# Patient Record
Sex: FEMALE | Race: WHITE | NOT HISPANIC OR LATINO | URBAN - METROPOLITAN AREA
[De-identification: names, ages, dates, MRNs, and addresses within clinical notes are randomized per-mention and may not be internally consistent; named-entity substitution may affect disease eponyms.]

---

## 2023-02-20 PROBLEM — N97.9 FEMALE INFERTILITY: Status: ACTIVE | Noted: 2023-02-20

## 2023-03-14 NOTE — PROGRESS NOTES
Subjective   Tasia Garcia is a 37 y.o. female who presents for new to me patinet.  HPI  37-year-old female, new here for the first time, denies chest pain shortness of breath fever chills nausea vomiting mother to 4 years old, healthy as far as she know on mvi occasionally, has not followed with physician for many years . Mother passed from Pancreatic cancer 2021, at 58 y/o. Father is 64.  Patient is a brother who  from cerebral palsy.  The patient is active when she can she denies fever chills nausea vomiting constipation dysuria urgency frequency.  Review of Systems  Reviewed of 14 systems with the patient pertinent positives and pertinent negative are to be found in the history of present illness.  Objective     Visit Vitals  /84   Pulse 78   Resp 14      Physical Exam  HEENT: Atraumatic normocephalic the pupils are equal and round and reactive to light the sclerae nonicteric extraocular motion are intact.  Neck: Is supple without JVD no carotid bruits the trachea is midline there are no masses pulses are equal and bilateral with normal upstroke.  Skin: Normal.  Skin good texture.  Moist.  Good turgor.  No lesions, no rashes.  Lymph: No lymphadenopathy appreciated, no masses, no lesions  Lungs: Are clear to auscultation and percussion, good breath sounds bilaterally, no rhonchi, no wheezing, good diaphragmatic excursion.  Heart: Normal rate and normal rhythm S1, S2, no S3, no gallop, murmur or rub.  Abdomen: Soft, nontender, no organomegaly, good bowel sounds.    Extremities: Full range of motion, good pulses bilateral.  No cyanosis, no clubbing or edema.  Neuro: Cranial nerves II-XII are grossly intact there is no sensory or motor deficits.  Able to move all extremities.      Assessment/Plan     New patient    Follows with Gyn  Dr Kamilla Pool  Pap up todate    Fasting blood work    CBC BMP lipids AST ALT vitamin D    Vaccination  Seizure record    BMI  39.04 kg/msq  Life style  modification  Exercise 30 min 4 X week     With a long discussion about pancreatic cancer  Mother  young age 57 extremely difficult to deal with at this age as she was your best friend.  He have good support from your family and his stepfather.  I think it is very reasonable to have an ultrasound of the abdomen to look at your pancreas to make sure that everything looks okay.  Also recommended to follow-up routinely probably once a year for now unless otherwise instructed based on the blood test results.  If you have any questions feel free to call or make an appointment.          Problem List Items Addressed This Visit          Genitourinary    Female infertility       Endocrine/Metabolic    BMI 39.0-39.9,adult - Primary    Relevant Orders    US abdomen    Basic Metabolic Panel       Other    Physical exam    Relevant Orders    CBC    Basic Metabolic Panel    Lipid Panel    TSH    Hair thinning    Relevant Orders    TSH     Other Visit Diagnoses       Family history of pancreatic cancer        Relevant Orders    US abdomen              Arya Ku MD

## 2023-03-16 ENCOUNTER — OFFICE VISIT (OUTPATIENT)
Dept: PRIMARY CARE | Facility: CLINIC | Age: 38
End: 2023-03-16
Payer: COMMERCIAL

## 2023-03-16 VITALS
RESPIRATION RATE: 14 BRPM | BODY MASS INDEX: 38.64 KG/M2 | SYSTOLIC BLOOD PRESSURE: 122 MMHG | DIASTOLIC BLOOD PRESSURE: 84 MMHG | WEIGHT: 210 LBS | HEIGHT: 62 IN | HEART RATE: 78 BPM

## 2023-03-16 DIAGNOSIS — L65.9 HAIR THINNING: ICD-10-CM

## 2023-03-16 DIAGNOSIS — Z80.0 FAMILY HISTORY OF PANCREATIC CANCER: ICD-10-CM

## 2023-03-16 DIAGNOSIS — N97.9 FEMALE INFERTILITY: ICD-10-CM

## 2023-03-16 DIAGNOSIS — Z00.00 PHYSICAL EXAM: ICD-10-CM

## 2023-03-16 PROCEDURE — 3008F BODY MASS INDEX DOCD: CPT | Performed by: INTERNAL MEDICINE

## 2023-03-16 PROCEDURE — 80048 BASIC METABOLIC PNL TOTAL CA: CPT | Performed by: INTERNAL MEDICINE

## 2023-03-16 PROCEDURE — 36415 COLL VENOUS BLD VENIPUNCTURE: CPT | Performed by: INTERNAL MEDICINE

## 2023-03-16 PROCEDURE — 99204 OFFICE O/P NEW MOD 45 MIN: CPT | Performed by: INTERNAL MEDICINE

## 2023-03-16 PROCEDURE — 84443 ASSAY THYROID STIM HORMONE: CPT | Performed by: INTERNAL MEDICINE

## 2023-03-16 PROCEDURE — 80061 LIPID PANEL: CPT | Performed by: INTERNAL MEDICINE

## 2023-03-16 PROCEDURE — 85025 COMPLETE CBC W/AUTO DIFF WBC: CPT | Performed by: INTERNAL MEDICINE

## 2023-03-16 PROCEDURE — 1036F TOBACCO NON-USER: CPT | Performed by: INTERNAL MEDICINE

## 2023-03-16 ASSESSMENT — PAIN SCALES - GENERAL: PAINLEVEL: 0-NO PAIN

## 2023-07-13 ENCOUNTER — TELEMEDICINE (OUTPATIENT)
Dept: PRIMARY CARE | Facility: CLINIC | Age: 38
End: 2023-07-13
Payer: COMMERCIAL

## 2023-07-13 PROBLEM — E55.9 VITAMIN D DEFICIENCY: Status: ACTIVE | Noted: 2018-06-05

## 2023-07-13 PROCEDURE — 99213 OFFICE O/P EST LOW 20 MIN: CPT | Performed by: INTERNAL MEDICINE

## 2023-07-13 RX ORDER — TIRZEPATIDE 2.5 MG/.5ML
2.5 INJECTION, SOLUTION SUBCUTANEOUS
Qty: 3 ML | Refills: 1 | Status: SHIPPED | OUTPATIENT
Start: 2023-07-13

## 2023-07-13 NOTE — PROGRESS NOTES
Subjective   Tasia Garcia is a 37 y.o. female who presents for virtual visit.  HPI  Tasia is 37 female with a known history of elevated BMI vitamin D insufficiency patient called regarding her inability to lose weight, inquiring about Mounjaro, or Ozempic, patient tried multiple diets with difficulty losing weight, current BMI 39.04 kg meter square, patient denies fever chills nausea vomiting constipation diarrhea dysuria urgency frequency.  Patient admits to poor dietary regimen due to longer working hours.  Review of Systems  10 system review pertinent as above  Objective   Virtual  There were no vitals taken for this visit.   Physical Exam    Virtual    Assessment/Plan     Elevated BMI  39.04 kg meter square  Right multiple diets without success  And difficulties losing weight  Patient is up at 7 AM and goes to bed at 3 AM  Due to her work regimen  Majority of food is consumed between 7 PM and 3 AM  Patient is primarily sedentary does not exercise  We spoke about caloric restrictive diet  Including 2 hard-boiled eggs at about 6 PM 7 PM  A salad and protein about 11 PM  And plenty of fluids.  In addition we will try Mounjaro 2.5 mg daily  To try and help with weight management  Possible side effects including but not limited to abdominal pain abdominal bloating diarrhea  This will come in to affect only if patient has a prescription  She will let me know so we can review   Please call if any updates or any questions        Problem List Items Addressed This Visit       BMI 39.0-39.9,adult - Primary    Relevant Medications    tirzepatide (Mounjaro) 2.5 mg/0.5 mL pen injector         Arya Ku MD

## 2023-07-17 ENCOUNTER — TELEPHONE (OUTPATIENT)
Dept: PRIMARY CARE | Facility: CLINIC | Age: 38
End: 2023-07-17
Payer: COMMERCIAL

## 2024-02-06 NOTE — PROGRESS NOTES
Subjective   Tasia Garcia is a 38 y.o. female who presents for a follow-up.  HPI  38-year-old female, new here for the first time, denies chest pain shortness of breath fever chills nausea vomiting mother to 4 years old, healthy as far as she know on mvi occasionally, has not followed with physician for many years . Mother passed from Pancreatic cancer 2021, at 58 y/o. Father is 64.  Patient is a brother who  from cerebral palsy.  The patient is active when she can she denies fever chills nausea vomiting constipation dysuria urgency frequency.  Review of Systems  Reviewed of 14 systems with the patient pertinent positives and pertinent negative are to be found in the history of present illness.  Objective     Visit Vitals  /82   Pulse 74   Temp 36.6 °C (97.9 °F)   Resp 15      Physical Exam  HEENT: Atraumatic normocephalic the pupils are equal and round and reactive to light the sclerae nonicteric extraocular motion are intact.  Neck: Is supple without JVD no carotid bruits the trachea is midline there are no masses pulses are equal and bilateral with normal upstroke.  Skin: Normal.  Skin good texture.  Moist.  Good turgor.  No lesions, no rashes.  Lymph: No lymphadenopathy appreciated, no masses, no lesions  Lungs: Are clear to auscultation and percussion, good breath sounds bilaterally, no rhonchi, no wheezing, good diaphragmatic excursion.  Heart: Normal rate and normal rhythm S1, S2, no S3, no gallop, murmur or rub.  Abdomen: Soft, nontender, no organomegaly, good bowel sounds.    Extremities: Full range of motion, good pulses bilateral.  No cyanosis, no clubbing or edema.  Neuro: Cranial nerves II-XII are grossly intact there is no sensory or motor deficits.  Able to move all extremities.      Assessment/Plan     Is here for follow-up, and fasting blood work    Continue with the low-fat, low-cholesterol diet,  I recommended Mediterranean diet, which include fish, chicken, vegetables and olive  oil  Exercise daily for 30 minutes at least 3 times a week  Continue home medications      Follows with Gyn  Dr Kamilla Pool  Pap up todate    Fasting blood work    CBC BMP lipids AST ALT vitamin D    Vaccination  Seizure record    BMI 40.71 kg/m swq  39.04 kg/msq  Life style modification  Exercise 30 min 4 X week       Mother  young age 57 extremely difficult to deal with at this age as she was your best friend.  He have good support from your family and his stepfather.  I think it is very reasonable to have an ultrasound of the abdomen to look at your pancreas to make sure that everything looks okay.  Also recommended to follow-up routinely probably once a year for now unless otherwise instructed based on the blood test results.  If you have any questions feel free to call or make an appointment.          Problem List Items Addressed This Visit       Vitamin D deficiency    Relevant Orders    Vitamin D 25-Hydroxy,Total (for eval of Vitamin D levels)    Dyslipidemia - Primary    Relevant Orders    Lipid Panel    AST    BMI 40.0-44.9, adult (CMS/HCC)    Relevant Medications    semaglutide, weight loss, (Wegovy) 0.25 mg/0.5 mL pen injector    Other Relevant Orders    Lipid Panel    Basic Metabolic Panel       Arya Ku MD

## 2024-02-08 ENCOUNTER — OFFICE VISIT (OUTPATIENT)
Dept: PRIMARY CARE | Facility: CLINIC | Age: 39
End: 2024-02-08
Payer: COMMERCIAL

## 2024-02-08 VITALS
RESPIRATION RATE: 15 BRPM | WEIGHT: 219 LBS | HEART RATE: 74 BPM | BODY MASS INDEX: 40.3 KG/M2 | TEMPERATURE: 97.9 F | SYSTOLIC BLOOD PRESSURE: 120 MMHG | HEIGHT: 62 IN | DIASTOLIC BLOOD PRESSURE: 82 MMHG

## 2024-02-08 DIAGNOSIS — E78.5 DYSLIPIDEMIA: Primary | ICD-10-CM

## 2024-02-08 DIAGNOSIS — E55.9 VITAMIN D DEFICIENCY: ICD-10-CM

## 2024-02-08 PROCEDURE — 80061 LIPID PANEL: CPT | Performed by: INTERNAL MEDICINE

## 2024-02-08 PROCEDURE — 1036F TOBACCO NON-USER: CPT | Performed by: INTERNAL MEDICINE

## 2024-02-08 PROCEDURE — 82306 VITAMIN D 25 HYDROXY: CPT | Performed by: INTERNAL MEDICINE

## 2024-02-08 PROCEDURE — 3008F BODY MASS INDEX DOCD: CPT | Performed by: INTERNAL MEDICINE

## 2024-02-08 PROCEDURE — 99214 OFFICE O/P EST MOD 30 MIN: CPT | Performed by: INTERNAL MEDICINE

## 2024-02-08 PROCEDURE — 80048 BASIC METABOLIC PNL TOTAL CA: CPT | Performed by: INTERNAL MEDICINE

## 2024-02-08 PROCEDURE — 84450 TRANSFERASE (AST) (SGOT): CPT | Performed by: INTERNAL MEDICINE

## 2024-02-08 RX ORDER — SEMAGLUTIDE 0.25 MG/.5ML
0.25 INJECTION, SOLUTION SUBCUTANEOUS
Qty: 2 ML | Refills: 0 | Status: SHIPPED | OUTPATIENT
Start: 2024-02-08 | End: 2024-02-09

## 2024-02-08 ASSESSMENT — PAIN SCALES - GENERAL: PAINLEVEL: 0-NO PAIN

## 2024-02-09 RX ORDER — SEMAGLUTIDE 0.25 MG/.5ML
0.25 INJECTION, SOLUTION SUBCUTANEOUS
Qty: 2 ML | Refills: 0 | Status: SHIPPED | OUTPATIENT
Start: 2024-02-09 | End: 2024-03-02

## 2024-10-15 NOTE — PROGRESS NOTES
Subjective   Tasia Garcia is a 38 y.o. female who presents for a follow-up.  HPI  38-year-old female, new here for the first time, denies chest pain shortness of breath fever chills nausea vomiting mother to 4 years old, healthy as far as she know on mvi occasionally, has not followed with physician for many years . Mother passed from Pancreatic cancer 2021, at 58 y/o. Father is 64.  Patient is a brother who  from cerebral palsy.  The patient is active when she can she denies fever chills nausea vomiting constipation dysuria urgency frequency. Patient lost near 29l bs on Simaglutide currently venus out of pocket .   Review of Systems  Reviewed of 14 systems with the patient pertinent positives and pertinent negative are to be found in the history of present illness.  Objective     Visit Vitals  /80   Pulse 74   Temp 36.5 °C (97.7 °F)   Resp 14        Physical Exam  HEENT: Atraumatic normocephalic the pupils are equal and round and reactive to light the sclerae nonicteric extraocular motion are intact.  Neck: Is supple without JVD no carotid bruits the trachea is midline there are no masses pulses are equal and bilateral with normal upstroke.  Skin: Normal.  Skin good texture.  Moist.  Good turgor.  No lesions, no rashes.  Lymph: No lymphadenopathy appreciated, no masses, no lesions  Lungs: Are clear to auscultation and percussion, good breath sounds bilaterally, no rhonchi, no wheezing, good diaphragmatic excursion.  Heart: Normal rate and normal rhythm S1, S2, no S3, no gallop, murmur or rub.  Abdomen: Soft, nontender, no organomegaly, good bowel sounds.    Extremities: Full range of motion, good pulses bilateral.  No cyanosis, no clubbing or edema.  Neuro: Cranial nerves II-XII are grossly intact there is no sensory or motor deficits.  Able to move all extremities.      Assessment/Plan     Is here for follow-up, and fasting blood work    Cbc BMP LIPID AST ALT Vitamin d 25    Patient is  active    Continue with the low-fat, low-cholesterol diet,  I recommended Mediterranean diet, which include fish, chicken, vegetables and olive oil  Exercise daily for 30 minutes at least 3 times a week  Continue home medications      Follows with Gyn  Dr Bianca Pool  Pap up to date    Mammogram age 41    Fasting blood work    CBC BMP lipids AST ALT vitamin D    Vaccination  Flu vaccine 2024    BMI 33.65 kg/m swq  39.04 kg/msq  Life style modification  Exercise 30 min 4 X week     Yearaly abdominal us  Fhx of Pancreatic cancer  Mother  age 57          Problem List Items Addressed This Visit       Dyslipidemia    Relevant Medications    semaglutide, weight loss, (Wegovy) 1.7 mg/0.75 mL pen injector    Other Relevant Orders    Lipid Panel    Basic Metabolic Panel     Other Visit Diagnoses       BMI 33.0-33.9,adult    -  Primary    Relevant Medications    semaglutide, weight loss, (Wegovy) 1.7 mg/0.75 mL pen injector              Arya Ku MD

## 2024-10-17 ENCOUNTER — APPOINTMENT (OUTPATIENT)
Dept: PRIMARY CARE | Facility: CLINIC | Age: 39
End: 2024-10-17
Payer: COMMERCIAL

## 2024-10-17 VITALS
SYSTOLIC BLOOD PRESSURE: 124 MMHG | DIASTOLIC BLOOD PRESSURE: 80 MMHG | HEIGHT: 62 IN | HEART RATE: 74 BPM | RESPIRATION RATE: 14 BRPM | TEMPERATURE: 97.7 F | BODY MASS INDEX: 33.31 KG/M2 | WEIGHT: 181 LBS

## 2024-10-17 DIAGNOSIS — Z80.0 FAMILY HISTORY OF PANCREATIC CANCER: ICD-10-CM

## 2024-10-17 DIAGNOSIS — E78.5 DYSLIPIDEMIA: ICD-10-CM

## 2024-10-17 LAB
ANION GAP SERPL CALC-SCNC: 13 MMOL/L (ref 10–20)
BUN SERPL-MCNC: 8 MG/DL (ref 7–18)
CALCIUM SERPL-MCNC: 9 MG/DL (ref 8.5–10.1)
CHLORIDE SERPL-SCNC: 102 MMOL/L (ref 98–107)
CHOLEST SERPL-MCNC: 182 MG/DL (ref 0–199)
CHOLESTEROL/HDL RATIO: 3.3 (ref 4.2–7)
CO2 SERPL-SCNC: 26 MMOL/L (ref 21–32)
CREAT SERPL-MCNC: 0.66 MG/DL (ref 0.6–1.1)
EGFRCR SERPLBLD CKD-EPI 2021: >90 ML/MIN/1.73M*2
GLUCOSE SERPL-MCNC: 91 MG/DL (ref 74–100)
HDLC SERPL-MCNC: 55 MG/DL (ref 40–59)
IS PATIENT FASTING: YES
LDLC SERPL DIRECT ASSAY-MCNC: 109 MG/DL (ref 0–100)
POTASSIUM SERPL-SCNC: 4.3 MMOL/L (ref 3.5–5.1)
SODIUM SERPL-SCNC: 137 MMOL/L (ref 136–145)
TRIGL SERPL-MCNC: 93 MG/DL

## 2024-10-17 PROCEDURE — 3008F BODY MASS INDEX DOCD: CPT | Performed by: INTERNAL MEDICINE

## 2024-10-17 PROCEDURE — 90656 IIV3 VACC NO PRSV 0.5 ML IM: CPT | Performed by: INTERNAL MEDICINE

## 2024-10-17 PROCEDURE — 80048 BASIC METABOLIC PNL TOTAL CA: CPT | Performed by: INTERNAL MEDICINE

## 2024-10-17 PROCEDURE — 80061 LIPID PANEL: CPT | Performed by: INTERNAL MEDICINE

## 2024-10-17 PROCEDURE — 99214 OFFICE O/P EST MOD 30 MIN: CPT | Performed by: INTERNAL MEDICINE

## 2024-10-17 PROCEDURE — 90471 IMMUNIZATION ADMIN: CPT | Performed by: INTERNAL MEDICINE

## 2024-10-17 RX ORDER — SEMAGLUTIDE 1.7 MG/.75ML
1.7 INJECTION, SOLUTION SUBCUTANEOUS WEEKLY
Qty: 3 ML | Refills: 0 | Status: SHIPPED | OUTPATIENT
Start: 2024-10-17 | End: 2024-11-08

## 2024-10-17 ASSESSMENT — PROMIS GLOBAL HEALTH SCALE
RATE_GENERAL_HEALTH: EXCELLENT
RATE_AVERAGE_PAIN: 2
RATE_MENTAL_HEALTH: EXCELLENT
RATE_SOCIAL_SATISFACTION: EXCELLENT
CARRYOUT_PHYSICAL_ACTIVITIES: COMPLETELY
EMOTIONAL_PROBLEMS: NEVER
RATE_QUALITY_OF_LIFE: EXCELLENT
RATE_PHYSICAL_HEALTH: EXCELLENT
CARRYOUT_SOCIAL_ACTIVITIES: EXCELLENT

## 2024-10-17 ASSESSMENT — PAIN SCALES - GENERAL: PAINLEVEL_OUTOF10: 0-NO PAIN

## 2024-10-17 ASSESSMENT — ENCOUNTER SYMPTOMS
LOSS OF SENSATION IN FEET: 0
OCCASIONAL FEELINGS OF UNSTEADINESS: 0
DEPRESSION: 0

## 2024-10-22 ENCOUNTER — HOSPITAL ENCOUNTER (OUTPATIENT)
Dept: RADIOLOGY | Facility: CLINIC | Age: 39
Discharge: HOME | End: 2024-10-22
Payer: COMMERCIAL

## 2024-10-22 DIAGNOSIS — Z80.0 FAMILY HISTORY OF PANCREATIC CANCER: ICD-10-CM

## 2024-10-22 PROCEDURE — 76700 US EXAM ABDOM COMPLETE: CPT

## 2024-10-22 PROCEDURE — 76700 US EXAM ABDOM COMPLETE: CPT | Performed by: STUDENT IN AN ORGANIZED HEALTH CARE EDUCATION/TRAINING PROGRAM

## 2024-11-12 ENCOUNTER — OFFICE VISIT (OUTPATIENT)
Dept: OTOLARYNGOLOGY | Facility: HOSPITAL | Age: 39
End: 2024-11-12
Payer: COMMERCIAL

## 2024-11-12 VITALS — HEIGHT: 62 IN | WEIGHT: 183 LBS | TEMPERATURE: 97.3 F | BODY MASS INDEX: 33.68 KG/M2

## 2024-11-12 DIAGNOSIS — C43.4 MALIGNANT MELANOMA OF SCALP (MULTI): Primary | ICD-10-CM

## 2024-11-12 PROCEDURE — 1036F TOBACCO NON-USER: CPT | Performed by: STUDENT IN AN ORGANIZED HEALTH CARE EDUCATION/TRAINING PROGRAM

## 2024-11-12 PROCEDURE — 99204 OFFICE O/P NEW MOD 45 MIN: CPT | Performed by: STUDENT IN AN ORGANIZED HEALTH CARE EDUCATION/TRAINING PROGRAM

## 2024-11-12 PROCEDURE — 3008F BODY MASS INDEX DOCD: CPT | Performed by: STUDENT IN AN ORGANIZED HEALTH CARE EDUCATION/TRAINING PROGRAM

## 2024-11-12 PROCEDURE — 99214 OFFICE O/P EST MOD 30 MIN: CPT | Performed by: STUDENT IN AN ORGANIZED HEALTH CARE EDUCATION/TRAINING PROGRAM

## 2024-11-12 ASSESSMENT — PATIENT HEALTH QUESTIONNAIRE - PHQ9
1. LITTLE INTEREST OR PLEASURE IN DOING THINGS: NOT AT ALL
SUM OF ALL RESPONSES TO PHQ9 QUESTIONS 1 & 2: 0
2. FEELING DOWN, DEPRESSED OR HOPELESS: NOT AT ALL

## 2024-11-12 ASSESSMENT — PAIN SCALES - GENERAL: PAINLEVEL_OUTOF10: 0-NO PAIN

## 2024-11-12 NOTE — H&P (VIEW-ONLY)
"HEAD AND NECK SURGERY CONSULT  [facility]    Referring Provider: Rosio DIANA ([facility])    HPI  I had the pleasure of seeing Tasia Garcia as a consultation today for evaluation of melanoma.     The patient reports a history of noticing a lump on her scalp in October. She sees a dermatologist in [place] annually. This was excised and came back as a melanoma. No prior skin cancers, had a pre cancer on her back.    Extensive sun exposure, frequently outside and tanning. Recent trip to [place], goes to [place]. Sister had history of melanoma.    The patient denies having prior trauma or surgery to their head and neck. There is not a personal or family history of blood clots, easy bleeding or bruising, or anesthesia concerns. The patient's occupation is .     Tobacco use: The patient  reports that she has never smoked. She has never been exposed to tobacco smoke. She has never used smokeless tobacco.   Alcohol Use: The patient  reports current alcohol use.     Physical Examination  Vitals:  Temp 36.3 °C (97.3 °F) (Temporal)   Ht 1.564 m (5' 1.58\")   Wt 83 kg (183 lb)   BMI 33.93 kg/m²   General: Examination reveals a well-developed, well-nourished patient in no apparent distress.  The patient has no audible dysphonia, stridor or airway distress.  The patient is oriented, alert and responsive.    Skin: tan, small left parietal scalp lesion, see photo below  Oral Cavity:  The patient is able to open the mouth widely without trismus.  The floor of mouth and oral tongue are soft, and no mucosal abnormalities are noted on the lips or within the oral cavity.  The oral tongue is fully mobile and midline on protrusion.    Oropharynx: There are no mucosal abnormalities noted within the oropharynx.  The soft palate elevates symmetrically, the tonsils and base of tongue are normal to inspection and palpation.       Salivary glands: There are no palpable masses of the parotid or submandibular " glands.   Neuro: Cranial nerves 2-12 are without obvious abnormality.  Neck: The neck is soft and symmetric.  There is no palpable adenopathy.       DATA REVIEWED:  Pathology: I reviewed the pathology report from the biopsy 11/7/24 which demonstrated malignant melanoma, no histologic type specified, breslow depth at least 0.4 mm, mitotic rate 2/mm^2, concern for metastasis     ASSESSMENT and PLAN:    Melanoma scalp,   - Exam and pathology findings discussed with patient and family  - We will need to obtain the outside pathology slides for review and discussion of patient and recommendations at Cutaneous Tumor Board  - Pending TB recommendations and imaging, to clear this scalp lesion would need WLE melanoma with 1 cm margins and [place] Lymph node biopsy  - Reviewed the procedure for SLNBx including injection with radiotracer dye, use of imaging and gamma probe to identify the probable sentinel node. I will address risks specific to the node location on the day of surgery. General risks and benefits discussed including pain, bleeding, infection, scar, need or further procedures, risks of anesthesia, poor cosmetic outcome, damage to surrounding structures including nerves and blood vessels. Also reviewed the risk of not being able to localize a sentinel lymph node  - Reconstruction options discussed including staged reconstruction, split or full thickness skin graft, local tissue rearrangement. I recommend interval application of skin substitute over the defect with plans for scar excision and scalp closure     Questions were answered and they are in agreement with the plan  Madelin Russell MD

## 2024-11-12 NOTE — PROGRESS NOTES
"HEAD AND NECK SURGERY CONSULT  [facility] [place]    Referring Provider: Rosio DIANA ([facility])    HPI  I had the pleasure of seeing Tasia Garcia as a consultation today for evaluation of melanoma.     The patient reports a history of noticing a lump on her scalp in October. She sees a dermatologist in [place] annually. This was excised and came back as a melanoma. No prior skin cancers, had a pre cancer on her back.    Extensive sun exposure, frequently outside and tanning. Recent trip to [place], goes to [place]. Sister had history of melanoma.    The patient denies having prior trauma or surgery to their head and neck. There is not a personal or family history of blood clots, easy bleeding or bruising, or anesthesia concerns. The patient's occupation is .     Tobacco use: The patient  reports that she has never smoked. She has never been exposed to tobacco smoke. She has never used smokeless tobacco.   Alcohol Use: The patient  reports current alcohol use.     Physical Examination  Vitals:  Temp 36.3 °C (97.3 °F) (Temporal)   Ht 1.564 m (5' 1.58\")   Wt 83 kg (183 lb)   BMI 33.93 kg/m²   General: Examination reveals a well-developed, well-nourished patient in no apparent distress.  The patient has no audible dysphonia, stridor or airway distress.  The patient is oriented, alert and responsive.    Skin: tan, small left parietal scalp lesion, see photo below  Oral Cavity:  The patient is able to open the mouth widely without trismus.  The floor of mouth and oral tongue are soft, and no mucosal abnormalities are noted on the lips or within the oral cavity.  The oral tongue is fully mobile and midline on protrusion.    Oropharynx: There are no mucosal abnormalities noted within the oropharynx.  The soft palate elevates symmetrically, the tonsils and base of tongue are normal to inspection and palpation.       Salivary glands: There are no palpable masses of the parotid or submandibular " glands.   Neuro: Cranial nerves 2-12 are without obvious abnormality.  Neck: The neck is soft and symmetric.  There is no palpable adenopathy.       DATA REVIEWED:  Pathology: I reviewed the pathology report from the biopsy 11/7/24 which demonstrated malignant melanoma, no histologic type specified, breslow depth at least 0.4 mm, mitotic rate 2/mm^2, concern for metastasis     ASSESSMENT and PLAN:    Melanoma scalp,   - Exam and pathology findings discussed with patient and family  - We will need to obtain the outside pathology slides for review and discussion of patient and recommendations at Cutaneous Tumor Board  - Pending TB recommendations and imaging, to clear this scalp lesion would need WLE melanoma with 1 cm margins and [place] Lymph node biopsy  - Reviewed the procedure for SLNBx including injection with radiotracer dye, use of imaging and gamma probe to identify the probable sentinel node. I will address risks specific to the node location on the day of surgery. General risks and benefits discussed including pain, bleeding, infection, scar, need or further procedures, risks of anesthesia, poor cosmetic outcome, damage to surrounding structures including nerves and blood vessels. Also reviewed the risk of not being able to localize a sentinel lymph node  - Reconstruction options discussed including staged reconstruction, split or full thickness skin graft, local tissue rearrangement. I recommend interval application of skin substitute over the defect with plans for scar excision and scalp closure     Questions were answered and they are in agreement with the plan  Madelin Russell MD

## 2024-11-13 ENCOUNTER — LAB REQUISITION (OUTPATIENT)
Dept: DERMATOPATHOLOGY | Facility: CLINIC | Age: 39
End: 2024-11-13
Payer: COMMERCIAL

## 2024-11-13 DIAGNOSIS — C43.4 MALIGNANT MELANOMA OF SCALP AND NECK (MULTI): ICD-10-CM

## 2024-11-13 PROCEDURE — 88321 CONSLTJ&REPRT SLD PREP ELSWR: CPT | Performed by: DERMATOLOGY

## 2024-11-13 PROCEDURE — 88342 IMHCHEM/IMCYTCHM 1ST ANTB: CPT | Performed by: DERMATOLOGY

## 2024-11-15 DIAGNOSIS — C43.4 MALIGNANT MELANOMA OF SCALP (MULTI): ICD-10-CM

## 2024-11-17 ENCOUNTER — TELEPHONE (OUTPATIENT)
Dept: OTOLARYNGOLOGY | Facility: HOSPITAL | Age: 39
End: 2024-11-17
Payer: COMMERCIAL

## 2024-11-19 LAB
PATH REPORT.FINAL DX SPEC: NORMAL
PATH REPORT.GROSS SPEC: NORMAL
PATH REPORT.RELEVANT HX SPEC: NORMAL
PATH REPORT.TOTAL CANCER: NORMAL
PATHOLOGY SYNOPTIC REPORT: NORMAL

## 2024-11-20 ENCOUNTER — HOSPITAL ENCOUNTER (OUTPATIENT)
Dept: RADIOLOGY | Facility: HOSPITAL | Age: 39
End: 2024-11-20
Payer: COMMERCIAL

## 2024-11-20 ENCOUNTER — APPOINTMENT (OUTPATIENT)
Dept: RADIOLOGY | Facility: HOSPITAL | Age: 39
End: 2024-11-20
Payer: COMMERCIAL

## 2024-11-20 PROBLEM — C43.4 MALIGNANT MELANOMA OF SCALP (MULTI): Status: ACTIVE | Noted: 2024-11-15

## 2024-11-24 DIAGNOSIS — C43.9 MELANOMA OF SKIN (MULTI): ICD-10-CM

## 2024-11-24 NOTE — TUMOR BOARD NOTE
General Patient Information  Name:  Tasia Garcia  Evaluation #:  1  Conference Date:  11/25/2024  YOB: 1985  MRN:  [id number]  Program Physician(s):  Tevin Anne  Referring Physician(s):  Madelin Caldwell      Summary   Stage:  Annelise ([id number]) ; Melanoma 5 year survival: 99%    Assessment:  Malignant melanoma of the left central frontal scalp, Breslow thickness at least 0.4 mm, moderately transected at the deep margin, without ulceration.     The lack of epidermal attachment raises the possibility of metastatic melanoma.    Of note, outside pathology shows mitotic rate of 2 mm^2, while in-house read does not identify mitotic rate    Recommendation: Consider PET-CT given lack of epidermal attachment. WLE with 1 cm margins and consider SLNB.     Review Multidisciplinary Cutaneous Oncology Conference recommendation with patient.  Continue routine follow up and total body skin exams with Rosio Velazquez.    Follow Up:  Madelin Caldwell.      History and Physical Exam  Dermatologic History:   39 y.o. female with a biopsy of the left central frontal scalp on 10/22/2024 showing malignant melanoma of the left central frontal scalp, Breslow thickness at least 0.4 mm, moderately transected at the deep margin, without ulceration. The lack of epidermal attachment raises the possibility of metastatic melanoma.    Scheduled for WLE and SLNB with Dr. Russell on 12/5/2024    Past Medical History:    Past Medical History:   Diagnosis Date    Personal history of other diseases of the female genital tract     History of infertility         Pathology  Derm Consult: [id number]  Order: [id number]   Collected 11/13/2024 10:46       Status: Final result       Visible to patient: Yes (seen)       Dx: Malignant melanoma of scalp and neck ...    0 Result Notes      Component    FINAL DIAGNOSIS   8 SLIDES/1 BLOCK, [facility], #[id number] (BX: 10/22/2024)     SKIN, LEFT CENTRAL  FRONTAL SCALP, SHAVE REMOVAL:  MALIGNANT MELANOMA, BRESLOW THICKNESS AT LEAST 0.4 MM, PRESENT ON THE DEEP MARGIN, SEE NOTE.     Note: Microscopic examination reveals a specimen that extends into the superficial dermis. There is a focus of nests of atypical epithelioid cells in the dermis. These cells stain with antibodies against SOX-10 and BAP1. They do not stain significantly with antibodies against p16. There is occasional staining with antibodies against HMB45 and Ki-67 stains a significant proportion of these cells. A PRAME stain stains approximately seventy-five percent of the cells strongly. All control slides stain appropriately.     The lack of epidermal attachment raises the possibility of metastatic melanoma. If it is a primary melanoma it would have features as outlined in the synoptic report.     ** Electronically signed out by Bouchra Ayala MD **         Electronically signed by Bouchra Ayala MD on 11/19/2024 at 1411   Case Summary Report   MELANOMA OF THE SKIN: Biopsy   8th Edition - Protocol posted: 3/23/2022MELANOMA OF THE SKIN: BIOPSY - All Specimens  SPECIMEN   Procedure  Biopsy, shave   Specimen Laterality  Left   TUMOR   Tumor Site  Skin of scalp and neck: Left central frontal scalp        Histologic Type  Primary dermal   Maximum Tumor (Breslow) Thickness (Millimeters)  At least: 0.4 mm     Moderately transected on the deep margin.   Ulceration  Not identified   Anatomic (Flex) Level  At least level: III     Moderately transected on the deep margin.   Mitotic Rate  None identified   Microsatellite(s)  Not identified   Lymphovascular Invasion  Not identified   Neurotropism  Not identified   Tumor-Infiltrating Lymphocytes  Not identified   Tumor Regression  Not identified   MARGINS     Margin Status for Invasive Melanoma  Invasive melanoma present at margin   Margin(s) Involved by Invasive Melanoma  Deep   Margin Status for Melanoma in situ  Melanoma in situ is not present.   PATHOLOGIC STAGE  CLASSIFICATION (pTNM, AJCC 8th Edition)     pT Category  pT1a   .      Clinical History                 Radiology

## 2024-11-25 ENCOUNTER — TUMOR BOARD CONFERENCE (OUTPATIENT)
Dept: HEMATOLOGY/ONCOLOGY | Facility: HOSPITAL | Age: 39
End: 2024-11-25
Payer: COMMERCIAL

## 2024-11-25 ENCOUNTER — CLINICAL SUPPORT (OUTPATIENT)
Dept: PREADMISSION TESTING | Facility: HOSPITAL | Age: 39
End: 2024-11-25
Payer: COMMERCIAL

## 2024-11-25 DIAGNOSIS — C43.4 MALIGNANT MELANOMA OF SCALP (MULTI): Primary | ICD-10-CM

## 2024-11-25 ASSESSMENT — DUKE ACTIVITY SCORE INDEX (DASI)
TOTAL_SCORE: 58.2
CAN YOU DO MODERATE WORK AROUND THE HOUSE LIKE VACUUMING, SWEEPING FLOORS OR CARRYING GROCERIES: YES
CAN YOU CLIMB A FLIGHT OF STAIRS OR WALK UP A HILL: YES
CAN YOU PARTICIPATE IN MODERATE RECREATIONAL ACTIVITIES LIKE GOLF, BOWLING, DANCING, DOUBLES TENNIS OR THROWING A BASEBALL OR FOOTBALL: YES
CAN YOU DO YARD WORK LIKE RAKING LEAVES, WEEDING OR PUSHING A MOWER: YES
CAN YOU DO HEAVY WORK AROUND THE HOUSE LIKE SCRUBBING FLOORS OR LIFTING AND MOVING HEAVY FURNITURE: YES
CAN YOU PARTICIPATE IN STRENOUS SPORTS LIKE SWIMMING, SINGLES TENNIS, FOOTBALL, BASKETBALL, OR SKIING: YES
CAN YOU RUN A SHORT DISTANCE: YES
CAN YOU DO LIGHT WORK AROUND THE HOUSE LIKE DUSTING OR WASHING DISHES: YES
CAN YOU WALK A BLOCK OR TWO ON LEVEL GROUND: YES
DASI METS SCORE: 9.9
CAN YOU HAVE SEXUAL RELATIONS: YES
CAN YOU TAKE CARE OF YOURSELF (EAT, DRESS, BATHE, OR USE TOILET): YES
CAN YOU WALK INDOORS, SUCH AS AROUND YOUR HOUSE: YES

## 2024-11-25 ASSESSMENT — ENCOUNTER SYMPTOMS
RESPIRATORY NEGATIVE: 1
NEUROLOGICAL NEGATIVE: 1
CONSTITUTIONAL NEGATIVE: 1
CARDIOVASCULAR NEGATIVE: 1
ENDOCRINE NEGATIVE: 1
NECK NEGATIVE: 1
SKIN CHANGES: 1
GASTROINTESTINAL NEGATIVE: 1
EYES NEGATIVE: 1
MUSCULOSKELETAL NEGATIVE: 1

## 2024-11-25 NOTE — CPM/PAT NURSE NOTE
CPM/PAT Nurse Note      Name: Tasia Garcia (Tasia Garcia)  /Age: 1985/39 y.o.     Tasia Garcia is scheduled for Excision Lesion Skin Head/Neck - Left  Biopsy Lymph Node Head/Neck - Bilateral  Excision Full Thickness Skin Graft Head/Neck - Bilateral on 24    **Nurse Call  Past Medical History:   Diagnosis Date    Melanoma of scalp (Multi)     Personal history of other diseases of the female genital tract     History of infertility       Past Surgical History:   Procedure Laterality Date    MULTIPLE TOOTH EXTRACTIONS         Patient  has no history on file for sexual activity.    Family History   Problem Relation Name Age of Onset    Other (cervical carcinoma) Mother      Cerebral palsy Sister      Breast cancer Paternal Grandmother         No Known Allergies    Prior to Admission medications    Medication Sig Start Date End Date Taking? Authorizing Provider   semaglutide, weight loss, (Wegovy) 1.7 mg/0.75 mL pen injector Inject 1.7 mg under the skin 1 (one) time per week for 4 doses. 10/17/24 11/8/24  Arya Ku MD   tirzepatide (Mounjaro) 2.5 mg/0.5 mL pen injector Inject 2.5 mg under the skin 1 (one) time per week. 23   Arya Ku MD        PAT ROS:   Constitutional:   neg    Neuro/Psych:   neg    Eyes:   neg    Ears:   neg    Nose:   neg    Mouth:   neg    Throat:   neg    Neck:   neg    Cardio:   neg    Respiratory:   neg    Endocrine:   neg    GI:   neg    :   neg    Musculoskeletal:   neg    Hematologic:   neg    Skin:   skin changes (Melanoma to scalp)      DASI Risk Score      Flowsheet Row Clinical Support from 2024 in [facility]   Can you take care of yourself (eat, dress, bathe, or use toilet)?  2.75 filed at 2024 1257   Can you walk indoors, such as around your house? 1.75 filed at 2024 1257   Can you walk a block or two on level ground?  2.75 filed at 2024 1257   Can you climb a flight of stairs or walk up a hill? 5.5 filed at  11/25/2024 1257   Can you run a short distance? 8 filed at 11/25/2024 1257   Can you do light work around the house like dusting or washing dishes? 2.7 filed at 11/25/2024 1257   Can you do moderate work around the house like vacuuming, sweeping floors or carrying groceries? 3.5 filed at 11/25/2024 1257   Can you do heavy work around the house like scrubbing floors or lifting and moving heavy furniture?  8 filed at 11/25/2024 1257   Can you do yard work like raking leaves, weeding or pushing a mower? 4.5 filed at 11/25/2024 1257   Can you have sexual relations? 5.25 filed at 11/25/2024 1257   Can you participate in moderate recreational activities like golf, bowling, dancing, doubles tennis or throwing a baseball or football? 6 filed at 11/25/2024 1257   Can you participate in strenous sports like swimming, singles tennis, football, basketball, or skiing? 7.5 filed at 11/25/2024 1257   DASI SCORE 58.2 filed at 11/25/2024 1257   METS Score (Will be calculated only when all the questions are answered) 9.9 filed at 11/25/2024 1257          Caprini DVT Assessment    No data to display       Modified Frailty Index    No data to display       CHADS2 Stroke Risk  Current as of about an hour ago        N/A 3 to 100%: High Risk   2 to < 3%: Medium Risk   0 to < 2%: Low Risk     Last Change: N/A          This score determines the patient's risk of having a stroke if the patient has atrial fibrillation.        This score is not applicable to this patient. Components are not calculated.          Revised Cardiac Risk Index    No data to display       Apfel Simplified Score    No data to display       Risk Analysis Index Results This Encounter    No data found in the last 10 encounters.       Prodigy: High Risk  Total Score: 0          ARISCAT Score for Postoperative Pulmonary Complications    No data to display       Grajeda Perioperative Risk for Myocardial Infarction or Cardiac Arrest (KAMILLE)    No data to display     Providers:                                                                                                              PCP: Arya Ku 10/17/24 follow-up, and fasting blood work         ENT: Madelin Russell 11/12/24 evaluation of melanoma to scalp, referred by Rosio DIANA at [facility]  - Reconstruction options discussed including staged reconstruction, split or full thickness skin graft, local tissue rearrangement. I recommend interval application of skin substitute over the defect with plans for scar excision and scalp closure         --TESTING:         - US abdomen: 10/22/24  IMPRESSION:  1. No acute abnormality in the visualized portions of the abdomen.  The pancreas could not be properly visualized and if any clinical  concern advise further assessment by dedicated CT scan or MRCP.  2. Uncomplicated cholelithiasis.  3. Slightly heterogenous hepatic echotexture could be related to  underlying steatosis.         - LDL: 109 on 10/17/24        _________________________________________________________________________  Medication instructions:   Instructed to hold Vitamins, Supplements and Ibuprofen 7 days prior to surgery         Fannie Valenzuela LPN  Preadmission Testing        Nurse Plan of Action:   Nurse call completed under the direction of NELSON Benton/Med instructions sent via [employer]

## 2024-11-25 NOTE — PREPROCEDURE INSTRUCTIONS
This summary includes instructions and information to aid you during your perioperative period.  Please read carefully. If you have any questions about your visit today, please call the number listed above.  If you become ill or have any changes to your health before your surgery, please contact your primary care provider and alert your surgeon.    Preparing for your Surgery       Exercises  Preoperative Deep Breathing Exercises  Why it is important to do deep breathing exercises before my surgery?  Deep breathing exercises strengthen your breathing muscles.  This helps you to recover after your surgery and decreases the chance of breathing complications.  How are the deep breathing exercises done?  Sit straight with your back supported.  Breathe in deeply and slowly through your nose. Your lower rib cage should expand and your abdomen may move forward.  Hold that breath for 3 to 5 seconds.  Breathe out through pursed lips, slowly and completely.  Rest and repeat 10 times every hour while awake.  Rest longer if you become dizzy or lightheaded.        Preoperative Brain Exercises    What are brain exercises?  A brain exercise is any activity that engages your thinking (cognitive) skills.    What types of activities are considered brain exercises?  Jigsaw puzzles, crossword puzzles, word jumble, memory games, word search, and many more.  Many can be found free online or on your phone via a mobile anabell.    Why should I do brain exercises before my surgery?  More recent research has shown brain exercise before surgery can lower the risk of postoperative delirium (confusion) which can be especially important for older adults.  Patients who did brain exercises for 5 to 10 hours the days before surgery, cut their risk of postoperative delirium in half up to 1 week after surgery.    Sit-to-Stand Exercise    What is the sit-to-stand exercise?  The sit-to-stand exercise strengthens the muscles of your lower body and  muscles in the center of your body (core muscles for stability) helping to maintain and improve your strength and mobility.  How do I do the sit-to-stand exercise?  The goal is to do this exercise without using your arms or hands.  If this is too difficult, use your arms and hands or a chair with armrests to help slowly push yourself to the standing position and lower yourself back to the sitting position. As the movement becomes easier use your arms and hands less.    Steps to the sit-to-stand exercise  Sit up tall in a sturdy chair, knees bent, feet flat on the floor shoulder-width apart.  Shift your hips/pelvis forward in the chair to correctly position yourself for the next movement.  Lean forward at your hips.  Stand up straight putting equal weight on both feet.  Check to be sure you are properly aligned with the chair, in a slow controlled movement sit back down.  Repeat this exercise 10-15 times.  If needed you can do it fewer times until your strength improves.  Rest for 1 minute.  Do another 10-15 sit-to-stand exercises.  Try to do this in the morning and evening.        Instructions    Preoperative Fasting Guidelines    Why must I stop eating and drinking near surgery time?  With sedation, food or liquid in your stomach can enter your lungs causing serious complications  Food can increase nausea and vomiting  When do I need to stop eating and drinking before my surgery?      Do not eat any food after midnight the night before your surgery/procedure. You may have up to 13.5 ounces of clear liquid until TWO hours before your instructed arrival time to the hospital.  This includes water, black tea/coffee, (no milk or cream) apple juice, and electrolyte drinks (Gatorade). You may chew gum until TWO hours before your surgery/procedure            Simple things you can do to help prevent blood clots     Blood clots are blockages that can form in the body's veins. When a blood clot forms in your deep veins, it  may be called a deep vein thrombosis, or DVT for short. Blood clots can happen in any part of the body where blood flows, but they are most common in the arms and legs. If a piece of a blood clot breaks free and travels to the lungs, it is called a pulmonary embolus (PE). A PE can be a very serious problem.         Being in the hospital or having surgery can raise your chances of getting a blood clot because you may not be well enough to move around as much as you normally do.         Ways you can help prevent blood clots in the hospital       Wearing SCDs  SCDs stands for Sequential Compression Devices.   SCDs are special sleeves that wrap around your legs. They attach to a pump that fills them with air to gently squeeze your legs every few minutes.  This helps return the blood in your legs to your heart.   SCDs should only be taken off when walking or bathing. SCDs may not be comfortable, but they can help save your life.              Pump SCD leg sleeves  Wearing compression stockings - if your doctor orders them. These special snug-fitting stockings gently squeeze your legs to help blood flow.       Walking. Walking helps move the blood in your legs.   If your doctor says it is ok, try walking the halls at least   5 times a day. Ask us to help you get up, so you don't fall.      Taking any blood-thinning medicines your doctor orders.              Ways you can help prevent blood clots at home         Wearing compression stockings - if your doctor orders them.   Walking - to help move the blood in your legs.    Taking any blood-thinning medicines your doctor orders.      Signs of a blood clot or PE    Tell your doctor or nurse right away if you have any of the problems listed below.         If you are at home, seek medical care right away. Call 911 for chest pain or problems breathing.            Signs of a blood clot (DVT) - such as pain, swelling, redness, or warmth in your arm or legs.  Signs of a pulmonary  embolism (PE) - such as chest pain or feeling short of breath      Tobacco and Alcohol;  Do not drink alcohol or smoke within 24 hours of surgery.  It is best to quit smoking for as long as possible before any surgery or procedure.      The Week before Surgery        Seven days before Surgery  Check your CPM medication instructions  Do the exercises provided to you by CPM   Arrange for a responsible, adult licensed  to take you home after surgery and stay with you for 24 hours.  You will not be permitted to drive yourself home if you have received any anesthetic/sedation  Six days before surgery  Check your CPM medication instructions  Do the exercises provided to you by CPM   Start using Chlorhexidene (CHG) body wash if prescribed  Five days before surgery  Check your CPM medication instructions  Do the exercises provided to you by CPM   Continue to use CHG body wash if prescribed  Three days before surgery  Check your CPM medication instructions  Do the exercises provided to you by CPM   Continue to use CHG body wash if prescribed  Two days before surgery  Check your CPM medication instructions  Do the exercises provided to you by CPM   Continue to use CHG body wash if prescribed    The Day before Surgery       Check your CPM medication and all other CPM instructions including when to stop eating and drinking  You will be called with your arrival time for surgery in the late afternoon.  If you do not receive a call please reach out to your surgeon's office.  Do not smoke or drink 24 hours before surgery  Prepare items to bring with you to the hospital  Shower with your chlorhexidine wash if prescribed  Brush your teeth and use your chlorhexidine dental rinse if prescribed    The Day of Surgery       Check your CPM medication instructions  Ensure you follow the instructions for when to stop eating and drinking  Shower, if prescribed use CHG.  Do not apply any lotions, creams, moisturizers, perfume or  deodorant  Brush your teeth and use your CHG dental rinse if prescribed  Wear loose comfortable clothing  Avoid make-up  Remove  jewelry and piercings, consider professional piercing removal with a plastic spacer if needed  Bring photo ID and Insurance card  Bring an accurate medication list that includes medication dose, frequency and allergies  Bring a copy of your advanced directives (will, health care power of )  Bring any devices and controllers as well as medical devices you have been provided with for surgery (CPAP, slings, braces, etc.)  Dentures, eyeglasses, and contacts will be removed before surgery, please bring cases for contacts or glasses

## 2024-11-25 NOTE — SIGNIFICANT EVENT
11/25/24 [facility]   DASI Activity Score Index   Can you take care of yourself (eat, dress, bathe, or use toilet)?  2.75   Can you walk indoors, such as around your house? 1.75   Can you walk a block or two on level ground?  2.75   Can you climb a flight of stairs or walk up a hill? 5.5   Can you run a short distance? 8   Can you do light work around the house like dusting or washing dishes? 2.7   Can you do moderate work around the house like vacuuming, sweeping floors or carrying groceries? 3.5   Can you do heavy work around the house like scrubbing floors or lifting and moving heavy furniture?  8   Can you do yard work like raking leaves, weeding or pushing a mower? 4.5   Can you have sexual relations? 5.25   Can you participate in moderate recreational activities like golf, bowling, dancing, doubles tennis or throwing a baseball or football? 6   Can you participate in strenous sports like swimming, singles tennis, football, basketball, or skiing? 7.5   DASI SCORE 58.2   METS Score (Will be calculated only when all the questions are answered) 9.9

## 2024-11-26 DIAGNOSIS — C43.4 MALIGNANT MELANOMA OF SCALP (MULTI): ICD-10-CM

## 2024-11-26 NOTE — TELEPHONE ENCOUNTER
Called patient to discuss tumor board recommendations and in-house pathology review. Reviewed options for mohs of scalp melanoma v WLE scalp with SLNBx. Reviewed risks and benefits of each option. Patient is scheduled for surgery with me next week, she would like to think about her options. Also recommend PET scan due to concern for metastatic melanoma on biopsy, this was ordered today.

## 2024-12-02 ENCOUNTER — APPOINTMENT (OUTPATIENT)
Dept: RADIOLOGY | Facility: HOSPITAL | Age: 39
End: 2024-12-02
Payer: COMMERCIAL

## 2024-12-02 NOTE — TELEPHONE ENCOUNTER
Discussed again with patient and dermatology, will plan to proceed with sentinel lymph node biopsy this week with me and outpatient mohs surgery with dermatology to address the primary melanoma on the scalp.

## 2024-12-04 ENCOUNTER — ANESTHESIA EVENT (OUTPATIENT)
Dept: OPERATING ROOM | Facility: HOSPITAL | Age: 39
End: 2024-12-04
Payer: COMMERCIAL

## 2024-12-05 ENCOUNTER — HOSPITAL ENCOUNTER (OUTPATIENT)
Dept: RADIOLOGY | Facility: HOSPITAL | Age: 39
Discharge: HOME | End: 2024-12-05
Payer: COMMERCIAL

## 2024-12-05 ENCOUNTER — APPOINTMENT (OUTPATIENT)
Dept: DERMATOLOGY | Facility: CLINIC | Age: 39
End: 2024-12-05
Payer: COMMERCIAL

## 2024-12-05 ENCOUNTER — ANESTHESIA (OUTPATIENT)
Dept: OPERATING ROOM | Facility: HOSPITAL | Age: 39
End: 2024-12-05
Payer: COMMERCIAL

## 2024-12-05 ENCOUNTER — HOSPITAL ENCOUNTER (OUTPATIENT)
Dept: RADIOLOGY | Facility: HOSPITAL | Age: 39
End: 2024-12-05
Payer: COMMERCIAL

## 2024-12-05 ENCOUNTER — HOSPITAL ENCOUNTER (OUTPATIENT)
Facility: HOSPITAL | Age: 39
Setting detail: OUTPATIENT SURGERY
Discharge: HOME | End: 2024-12-05
Attending: STUDENT IN AN ORGANIZED HEALTH CARE EDUCATION/TRAINING PROGRAM | Admitting: STUDENT IN AN ORGANIZED HEALTH CARE EDUCATION/TRAINING PROGRAM
Payer: COMMERCIAL

## 2024-12-05 VITALS
HEART RATE: 60 BPM | DIASTOLIC BLOOD PRESSURE: 82 MMHG | SYSTOLIC BLOOD PRESSURE: 140 MMHG | TEMPERATURE: 97 F | WEIGHT: 185.63 LBS | RESPIRATION RATE: 13 BRPM | BODY MASS INDEX: 34.42 KG/M2 | OXYGEN SATURATION: 98 %

## 2024-12-05 DIAGNOSIS — G89.18 POST-OP PAIN: ICD-10-CM

## 2024-12-05 DIAGNOSIS — C43.4 MALIGNANT MELANOMA OF SCALP (MULTI): ICD-10-CM

## 2024-12-05 DIAGNOSIS — C43.4 MALIGNANT MELANOMA OF SCALP (MULTI): Primary | ICD-10-CM

## 2024-12-05 LAB
ABO GROUP (TYPE) IN BLOOD: NORMAL
ANTIBODY SCREEN: NORMAL
PREGNANCY TEST URINE, POC: NEGATIVE
RH FACTOR (ANTIGEN D): NORMAL

## 2024-12-05 PROCEDURE — 3700000001 HC GENERAL ANESTHESIA TIME - INITIAL BASE CHARGE: Performed by: STUDENT IN AN ORGANIZED HEALTH CARE EDUCATION/TRAINING PROGRAM

## 2024-12-05 PROCEDURE — 2500000001 HC RX 250 WO HCPCS SELF ADMINISTERED DRUGS (ALT 637 FOR MEDICARE OP): Mod: SE | Performed by: STUDENT IN AN ORGANIZED HEALTH CARE EDUCATION/TRAINING PROGRAM

## 2024-12-05 PROCEDURE — 38510 BIOPSY/REMOVAL LYMPH NODES: CPT | Performed by: STUDENT IN AN ORGANIZED HEALTH CARE EDUCATION/TRAINING PROGRAM

## 2024-12-05 PROCEDURE — 2500000004 HC RX 250 GENERAL PHARMACY W/ HCPCS (ALT 636 FOR OP/ED): Mod: SE

## 2024-12-05 PROCEDURE — 36415 COLL VENOUS BLD VENIPUNCTURE: CPT | Performed by: STUDENT IN AN ORGANIZED HEALTH CARE EDUCATION/TRAINING PROGRAM

## 2024-12-05 PROCEDURE — 3700000002 HC GENERAL ANESTHESIA TIME - EACH INCREMENTAL 1 MINUTE: Performed by: STUDENT IN AN ORGANIZED HEALTH CARE EDUCATION/TRAINING PROGRAM

## 2024-12-05 PROCEDURE — 2500000004 HC RX 250 GENERAL PHARMACY W/ HCPCS (ALT 636 FOR OP/ED): Mod: SE | Performed by: STUDENT IN AN ORGANIZED HEALTH CARE EDUCATION/TRAINING PROGRAM

## 2024-12-05 PROCEDURE — 7100000001 HC RECOVERY ROOM TIME - INITIAL BASE CHARGE: Performed by: STUDENT IN AN ORGANIZED HEALTH CARE EDUCATION/TRAINING PROGRAM

## 2024-12-05 PROCEDURE — 7100000009 HC PHASE TWO TIME - INITIAL BASE CHARGE: Performed by: STUDENT IN AN ORGANIZED HEALTH CARE EDUCATION/TRAINING PROGRAM

## 2024-12-05 PROCEDURE — 2720000007 HC OR 272 NO HCPCS: Performed by: STUDENT IN AN ORGANIZED HEALTH CARE EDUCATION/TRAINING PROGRAM

## 2024-12-05 PROCEDURE — 7100000002 HC RECOVERY ROOM TIME - EACH INCREMENTAL 1 MINUTE: Performed by: STUDENT IN AN ORGANIZED HEALTH CARE EDUCATION/TRAINING PROGRAM

## 2024-12-05 PROCEDURE — 3600000003 HC OR TIME - INITIAL BASE CHARGE - PROCEDURE LEVEL THREE: Performed by: STUDENT IN AN ORGANIZED HEALTH CARE EDUCATION/TRAINING PROGRAM

## 2024-12-05 PROCEDURE — 3430000001 HC RX 343 DIAGNOSTIC RADIOPHARMACEUTICALS: Mod: SE | Performed by: STUDENT IN AN ORGANIZED HEALTH CARE EDUCATION/TRAINING PROGRAM

## 2024-12-05 PROCEDURE — A9520 TC99 TILMANOCEPT DIAG 0.5MCI: HCPCS | Mod: SE | Performed by: STUDENT IN AN ORGANIZED HEALTH CARE EDUCATION/TRAINING PROGRAM

## 2024-12-05 PROCEDURE — 3600000008 HC OR TIME - EACH INCREMENTAL 1 MINUTE - PROCEDURE LEVEL THREE: Performed by: STUDENT IN AN ORGANIZED HEALTH CARE EDUCATION/TRAINING PROGRAM

## 2024-12-05 PROCEDURE — 7100000010 HC PHASE TWO TIME - EACH INCREMENTAL 1 MINUTE: Performed by: STUDENT IN AN ORGANIZED HEALTH CARE EDUCATION/TRAINING PROGRAM

## 2024-12-05 PROCEDURE — 86900 BLOOD TYPING SEROLOGIC ABO: CPT | Performed by: STUDENT IN AN ORGANIZED HEALTH CARE EDUCATION/TRAINING PROGRAM

## 2024-12-05 PROCEDURE — 2500000005 HC RX 250 GENERAL PHARMACY W/O HCPCS: Mod: SE | Performed by: STUDENT IN AN ORGANIZED HEALTH CARE EDUCATION/TRAINING PROGRAM

## 2024-12-05 PROCEDURE — 78830 RP LOCLZJ TUM SPECT W/CT 1: CPT

## 2024-12-05 PROCEDURE — 81025 URINE PREGNANCY TEST: CPT | Performed by: STUDENT IN AN ORGANIZED HEALTH CARE EDUCATION/TRAINING PROGRAM

## 2024-12-05 RX ORDER — ACETAMINOPHEN 325 MG/1
650 TABLET ORAL EVERY 4 HOURS PRN
Status: DISCONTINUED | OUTPATIENT
Start: 2024-12-05 | End: 2024-12-10 | Stop reason: HOSPADM

## 2024-12-05 RX ORDER — PROPOFOL 10 MG/ML
INJECTION, EMULSION INTRAVENOUS AS NEEDED
Status: DISCONTINUED | OUTPATIENT
Start: 2024-12-05 | End: 2024-12-05

## 2024-12-05 RX ORDER — LIDOCAINE HYDROCHLORIDE 40 MG/ML
SOLUTION TOPICAL AS NEEDED
Status: DISCONTINUED | OUTPATIENT
Start: 2024-12-05 | End: 2024-12-05

## 2024-12-05 RX ORDER — PHENYLEPHRINE 10 MG/250 ML(40 MCG/ML)IN 0.9 % SOD.CHLORIDE INTRAVENOUS
CONTINUOUS PRN
Status: DISCONTINUED | OUTPATIENT
Start: 2024-12-05 | End: 2024-12-05

## 2024-12-05 RX ORDER — HYDROMORPHONE HYDROCHLORIDE 1 MG/ML
INJECTION, SOLUTION INTRAMUSCULAR; INTRAVENOUS; SUBCUTANEOUS AS NEEDED
Status: DISCONTINUED | OUTPATIENT
Start: 2024-12-05 | End: 2024-12-05

## 2024-12-05 RX ORDER — OXYMETAZOLINE HCL 0.05 %
SPRAY, NON-AEROSOL (ML) NASAL AS NEEDED
Status: DISCONTINUED | OUTPATIENT
Start: 2024-12-05 | End: 2024-12-05 | Stop reason: HOSPADM

## 2024-12-05 RX ORDER — HYDROMORPHONE HYDROCHLORIDE 0.2 MG/ML
0.2 INJECTION INTRAMUSCULAR; INTRAVENOUS; SUBCUTANEOUS EVERY 5 MIN PRN
Status: DISCONTINUED | OUTPATIENT
Start: 2024-12-05 | End: 2024-12-10 | Stop reason: HOSPADM

## 2024-12-05 RX ORDER — FENTANYL CITRATE 50 UG/ML
INJECTION, SOLUTION INTRAMUSCULAR; INTRAVENOUS AS NEEDED
Status: DISCONTINUED | OUTPATIENT
Start: 2024-12-05 | End: 2024-12-05

## 2024-12-05 RX ORDER — AMOXICILLIN 250 MG
1 CAPSULE ORAL DAILY
Qty: 10 TABLET | Refills: 0 | Status: SHIPPED | OUTPATIENT
Start: 2024-12-05 | End: 2024-12-15

## 2024-12-05 RX ORDER — ESMOLOL HYDROCHLORIDE 10 MG/ML
INJECTION INTRAVENOUS AS NEEDED
Status: DISCONTINUED | OUTPATIENT
Start: 2024-12-05 | End: 2024-12-05

## 2024-12-05 RX ORDER — DROPERIDOL 2.5 MG/ML
0.62 INJECTION, SOLUTION INTRAMUSCULAR; INTRAVENOUS ONCE AS NEEDED
Status: DISCONTINUED | OUTPATIENT
Start: 2024-12-05 | End: 2024-12-10 | Stop reason: HOSPADM

## 2024-12-05 RX ORDER — ROCURONIUM BROMIDE 10 MG/ML
INJECTION, SOLUTION INTRAVENOUS AS NEEDED
Status: DISCONTINUED | OUTPATIENT
Start: 2024-12-05 | End: 2024-12-05

## 2024-12-05 RX ORDER — LIDOCAINE HYDROCHLORIDE 10 MG/ML
0.1 INJECTION, SOLUTION INFILTRATION; PERINEURAL ONCE
Status: DISCONTINUED | OUTPATIENT
Start: 2024-12-05 | End: 2024-12-10 | Stop reason: HOSPADM

## 2024-12-05 RX ORDER — OXYCODONE HYDROCHLORIDE 5 MG/1
10 TABLET ORAL EVERY 4 HOURS PRN
Status: DISCONTINUED | OUTPATIENT
Start: 2024-12-05 | End: 2024-12-10 | Stop reason: HOSPADM

## 2024-12-05 RX ORDER — METHYLENE BLUE 10 MG/ML
INJECTION INTRAVENOUS AS NEEDED
Status: DISCONTINUED | OUTPATIENT
Start: 2024-12-05 | End: 2024-12-05 | Stop reason: HOSPADM

## 2024-12-05 RX ORDER — REMIFENTANIL HYDROCHLORIDE 1 MG/ML
INJECTION, POWDER, LYOPHILIZED, FOR SOLUTION INTRAVENOUS CONTINUOUS PRN
Status: DISCONTINUED | OUTPATIENT
Start: 2024-12-05 | End: 2024-12-05

## 2024-12-05 RX ORDER — OXYCODONE HYDROCHLORIDE 5 MG/1
5 TABLET ORAL EVERY 4 HOURS PRN
Status: DISCONTINUED | OUTPATIENT
Start: 2024-12-05 | End: 2024-12-10 | Stop reason: HOSPADM

## 2024-12-05 RX ORDER — OXYCODONE HYDROCHLORIDE 5 MG/1
5 TABLET ORAL EVERY 6 HOURS PRN
Qty: 12 TABLET | Refills: 0 | Status: SHIPPED | OUTPATIENT
Start: 2024-12-05 | End: 2024-12-08

## 2024-12-05 RX ORDER — ONDANSETRON HYDROCHLORIDE 2 MG/ML
4 INJECTION, SOLUTION INTRAVENOUS ONCE AS NEEDED
Status: DISCONTINUED | OUTPATIENT
Start: 2024-12-05 | End: 2024-12-10 | Stop reason: HOSPADM

## 2024-12-05 RX ORDER — HYDROMORPHONE HYDROCHLORIDE 0.2 MG/ML
0.1 INJECTION INTRAMUSCULAR; INTRAVENOUS; SUBCUTANEOUS EVERY 5 MIN PRN
Status: DISCONTINUED | OUTPATIENT
Start: 2024-12-05 | End: 2024-12-10 | Stop reason: HOSPADM

## 2024-12-05 RX ORDER — CEFAZOLIN 1 G/1
INJECTION, POWDER, FOR SOLUTION INTRAVENOUS AS NEEDED
Status: DISCONTINUED | OUTPATIENT
Start: 2024-12-05 | End: 2024-12-05

## 2024-12-05 RX ORDER — MIDAZOLAM HYDROCHLORIDE 1 MG/ML
INJECTION INTRAMUSCULAR; INTRAVENOUS AS NEEDED
Status: DISCONTINUED | OUTPATIENT
Start: 2024-12-05 | End: 2024-12-05

## 2024-12-05 RX ORDER — SODIUM CHLORIDE 0.9 G/100ML
IRRIGANT IRRIGATION AS NEEDED
Status: DISCONTINUED | OUTPATIENT
Start: 2024-12-05 | End: 2024-12-05 | Stop reason: HOSPADM

## 2024-12-05 RX ORDER — ACETAMINOPHEN 500 MG
1000 TABLET ORAL EVERY 8 HOURS PRN
Qty: 90 TABLET | Refills: 0 | Status: SHIPPED | OUTPATIENT
Start: 2024-12-05 | End: 2024-12-20

## 2024-12-05 RX ORDER — ONDANSETRON HYDROCHLORIDE 2 MG/ML
INJECTION, SOLUTION INTRAVENOUS AS NEEDED
Status: DISCONTINUED | OUTPATIENT
Start: 2024-12-05 | End: 2024-12-05

## 2024-12-05 ASSESSMENT — COLUMBIA-SUICIDE SEVERITY RATING SCALE - C-SSRS
6. HAVE YOU EVER DONE ANYTHING, STARTED TO DO ANYTHING, OR PREPARED TO DO ANYTHING TO END YOUR LIFE?: NO
1. IN THE PAST MONTH, HAVE YOU WISHED YOU WERE DEAD OR WISHED YOU COULD GO TO SLEEP AND NOT WAKE UP?: NO
2. HAVE YOU ACTUALLY HAD ANY THOUGHTS OF KILLING YOURSELF?: NO

## 2024-12-05 ASSESSMENT — PAIN SCALES - GENERAL
PAINLEVEL_OUTOF10: 0 - NO PAIN
PAINLEVEL_OUTOF10: 7
PAIN_LEVEL: 7

## 2024-12-05 ASSESSMENT — PAIN - FUNCTIONAL ASSESSMENT: PAIN_FUNCTIONAL_ASSESSMENT: 0-10

## 2024-12-05 NOTE — OP NOTE
[place] Lymph node biopsy (L) Operative Note     Date: 2024  OR Location: [facility] OR    Name: Tasia Garcia, : 1985, Age: 39 y.o., MRN: [id number], Sex: female    Diagnosis  Pre-op Diagnosis      * Malignant melanoma of scalp (Multi) [C43.4] Post-op Diagnosis     * Malignant melanoma of scalp (Multi) [C43.4]     Procedures  [place] Lymph node biopsy  [address] - [place] EXCISION MALIGNANT LESION F/E/E/N/L >4.0 CM      Surgeons      * Madelin Russell - Primary    Resident/Fellow/Other Assistant:  Surgeons and Role:     * Tierra Alexander MD - Resident - Assisting    Staff:   Circulator: Miguelina Alex Person: Jackelin    Anesthesia Staff: Anesthesiologist: Geri Coyne MD  Anesthesia Resident: Bernice Taylor MD    Procedure Summary  Anesthesia: General  ASA: III  Estimated Blood Loss: 5mL  Intra-op Medications:   Administrations occurring from 1255 to 1610 on 24:   Medication Name Total Dose   sodium chloride 0.9 % irrigation solution 1,000 mL   methylene blue (Provayblue) 1 % (10 mg/mL) injection 1 mg   oxymetazoline (Afrin) 0.05 % nasal spray 30 mL   thrombin-recombinant (Recothrom) 5,000 unit topical solution 5,000 Units   ceFAZolin (Ancef) 1 g 2 g   dexAMETHasone (Decadron) injection 4 mg/mL 10 mg   fentaNYL (Sublimaze) injection 50 mcg/mL 50 mcg   LR bolus Cannot be calculated   lidocaine (LTA Kit) for intubation 100 mL   midazolam PF (Versed) injection 1 mg/mL 2 mg   ondansetron 2 mg/mL 4 mg   phenylephrine (Emery-Synephrine) 10 mg in sodium chloride 0.9% 250 mL (0.04 mg/mL) infusion (premix) 0.22 mg   propofol (Diprivan) injection 10 mg/mL 200 mg   remifentanil (Ultiva) 1,000 mcg in sodium chloride 0.9% 50 mL (20 mcg/mL) infusion 0.21 mg   rocuronium 10 mg/mL 50 mg              Anesthesia Record               Intraprocedure I/O Totals          Intake    Remifentanil Drip 0.00 mL    The total shown is the total volume documented since Anesthesia Start was filed.    Phenylephrine Drip 0.00 mL     The total shown is the total volume documented since Anesthesia Start was filed.    Total Intake 0 mL          Specimen:   ID Type Source Tests Collected by Time   1 : Left sentinel lymph node #1 Tissue LYMPH NODE EXCISION DERMPATH LAB- DERMATOPATHOLOGY Madelin Russell MD 12/5/2024 1510                 Drains and/or Catheters: * None in log *    Tourniquet Times:         Findings: left intraparotid lymph node, 211 count with probe with appropriate drop of surgical bed    Indications: Tasia Garcia is an 39 y.o. female who is having surgery for Malignant melanoma of scalp (Multi) [C43.4].     The patient was seen in the preoperative area. The risks, benefits, complications, treatment options, non-operative alternatives, expected recovery and outcomes were discussed with the patient. The possibilities of reaction to medication, pulmonary aspiration, injury to surrounding structures, bleeding, recurrent infection, the need for additional procedures, failure to diagnose a condition, and creating a complication requiring transfusion or operation were discussed with the patient. The patient concurred with the proposed plan, giving informed consent.  The site of surgery was properly noted/marked if necessary per policy. The patient has been actively warmed in preoperative area. Preoperative antibiotics have been ordered and given within 1 hours of incision. Venous thrombosis prophylaxis have been ordered including bilateral sequential compression devices    Procedure Details:   The patient was brought to the operating room and anesthesia induced. The patient was prepped and draped in the usual sterile fashion. The dae-tumor region was injected with methylene blue per usual protocol.     An incision was planned in the region of the sentinel node as confirmed by imaging and the gamma probe. Incision was made with a 15 blade and dissection carried sharply deep through platysma. Gamma probe was used to guide dissection.  The methylene blue injection was used to guide dissection. Dissection was carried deep into deep parotid tissue using a mixture of sharp dissection and bipolar cautery. The node was identified in the deep space and removed. The surgical bed was activity was confirmed to have dropped to the background level. The lymph node was passed off the field for permanent pathology analysis.    The area was irrigated and hemostasis achieved. It was closed in layers with absorbable suture.     Complications:  None; patient tolerated the procedure well.    Disposition: PACU - hemodynamically stable.  Condition: stable     Attending Attestation: I was present and scrubbed for the key portions of the procedure.    Madelin Russell  Phone Number: [phone]

## 2024-12-05 NOTE — ANESTHESIA PROCEDURE NOTES
Airway  Date/Time: 12/5/2024 2:14 PM  Urgency: elective    Airway not difficult    Staffing  Performed: resident   Authorized by: Geri Coyne MD    Performed by: Bernice Taylor MD  Patient location during procedure: OR    Indications and Patient Condition  Indications for airway management: anesthesia  Spontaneous Ventilation: absent  Sedation level: deep  Preoxygenated: yes  Patient position: sniffing  Mask difficulty assessment: 1 - vent by mask  Planned trial extubation    Final Airway Details  Final airway type: endotracheal airway      Successful airway: ETT  Cuffed: yes   Successful intubation technique: direct laryngoscopy  Facilitating devices/methods: intubating stylet  Endotracheal tube insertion site: oral  Blade: Cielo  Blade size: #3  ETT size (mm): 6.0  Cormack-Lehane Classification: grade I - full view of glottis  Placement verified by: capnometry   Measured from: lips  ETT to lips (cm): 22  Number of attempts at approach: 1

## 2024-12-05 NOTE — ANESTHESIA PREPROCEDURE EVALUATION
Patient: Tasia Garcia    Procedure Information       Date/Time: 12/05/24 1255    Procedures:       [place] Lymph node biopsy + Scalp lesion (Left) - Wide local excision left frontal scalp melanoma, sentinel lymph node biopsy, possible skin graft      Biopsy Lymph Node Head/Neck (Bilateral)      Excision Full Thickness Skin Graft Head/Neck (Bilateral)    Location: [facility] OR 04 / [facility] OR    Surgeons: Madelin Russell MD            Relevant Problems   No relevant active problems       Clinical information reviewed:                   NPO Detail:  NPO/Void Status  Carbohydrate Drink Given Prior to Surgery? : N  Date of Last Liquid: 12/04/24  Time of Last Liquid: 2300  Date of Last Solid: 12/04/24  Time of Last Solid: 1930  Last Intake Type: Clear fluids; Light meal  Time of Last Void: 1306         Physical Exam    Airway  Mallampati: II  TM distance: >3 FB  Neck ROM: full  Comments: Can bite upper lip; nearly full neck motion   Cardiovascular   Rhythm: regular     Dental    Pulmonary    Abdominal        Anesthesia Plan    History of general anesthesia?: yes  History of complications of general anesthesia?: no    ASA 3     general     intravenous induction   Anesthetic plan and risks discussed with patient.  Use of blood products discussed with patient who.    Plan discussed with attending and resident.

## 2024-12-05 NOTE — ANESTHESIA POSTPROCEDURE EVALUATION
Patient: Tasia Garcia    Procedure Summary       Date: 12/05/24 Room / Location: [facility] OR 04 / [facility] OR    Anesthesia Start: 1356 Anesthesia Stop: 1617    Procedure: [place] Lymph node biopsy (Left: Face) Diagnosis:       Malignant melanoma of scalp (Multi)      (Malignant melanoma of scalp (Multi) [C43.4])    Surgeons: Madelin Russell MD Responsible Provider: Geri Coyne MD    Anesthesia Type: general ASA Status: 3            Anesthesia Type: general    Vitals Value Taken Time   /92 12/05/24 1614   Temp 36.0 12/05/24 1617   Pulse 63 12/05/24 1616   Resp 16 12/05/24 1616   SpO2 100 % 12/05/24 1616   Vitals shown include unfiled device data.    Anesthesia Post Evaluation    Patient location during evaluation: PACU  Patient participation: complete - patient participated  Level of consciousness: awake and alert  Pain score: 7  Pain management: adequate  Airway patency: patent  Cardiovascular status: acceptable, hemodynamically stable and blood pressure returned to baseline  Respiratory status: acceptable, spontaneous ventilation, unassisted and face mask  Hydration status: acceptable  Postoperative Nausea and Vomiting: none      There were no known notable events for this encounter.

## 2024-12-05 NOTE — DISCHARGE INSTRUCTIONS
[facility] DEPARTMENT OF OTOLARYNGOLOGY (ENT):  POST-OPERATIVE INSTRUCTIONS    Operations performed:   Left Cheek [place] Lymph Node Biopsy    Treatment/wound care:   Keep area(s) clean and dry.   It is okay to shower 48 hours after time of surgery.    Do not scrub wound(s), pat dry.    Do not submerge wound(s) in standing water until seen in followup (no tub bathing, swimming, or hot tubs).    Please visually inspect your wound(s) at least once daily.  If the wound(s) are in a difficult to see location, please use a mirror or have someone else assist with visual inspection.    white bandages on your cheek:  You have steri strips (small paper tape) along your incision. You can shower, pat dry; do not soak in a tub.  The steri strips will fall off on their own; do not peel them off.    Expected Post-Surgical Symptoms:  You may experience neck pain and a hoarse/weak voice. These symptoms are often temporary and may be due to irritation from the breathing tube that's inserted during surgery. Swallowing difficulties due to pain for several days.       Pain Control:    Adequate management:   Tylenol and Oxycodone can be taken as prescribed as needed for breakthrough pain.      Activity after Discharge:   When you go home, you can usually return to your regular activities.  Avoid strenuous activities, such as bicycle riding, jogging, weight lifting, or aerobic exercise, for at least 2 weeks.   No travelling for at least 7 days following surgery.  Do not drive or operate heavy machinery while taking narcotic pain medications as these medications can alter perception, impair judgement, and slow reaction times.    Diet: resume normal diet    Additional Instructions:   Medicines  DO NOT take blood thinners, such as warfarin (Coumadin), clopidogrel (Plavix), or aspirin until instructed to do so from your surgeon.   Do not take aspirin, medicines that contain aspirin, or non-steroidal anti-inflammatory medicines such  as ibuprofen (Advil, Motrin) or naproxen (Aleve) for 2 weeks after surgery unless otherwise directed by your doctor.  Take pain medicines exactly as directed.   If you are prescribed antibiotics, take them as directed. Do not stop taking them just because you feel better. You need to take the full course of antibiotics.

## 2024-12-05 NOTE — ANESTHESIA PROCEDURE NOTES
Peripheral IV  Date/Time: 12/5/2024 2:00 PM      Placement  Needle size: 20 G  Laterality: right  Location: hand  Local anesthetic: none  Site prep: chlorhexidine  Technique: anatomical landmarks  Attempts: 1

## 2024-12-10 LAB
LAB AP ASR DISCLAIMER: NORMAL
LABORATORY COMMENT REPORT: NORMAL
PATH REPORT.FINAL DX SPEC: NORMAL
PATH REPORT.GROSS SPEC: NORMAL
PATH REPORT.MICROSCOPIC SPEC OTHER STN: NORMAL
PATH REPORT.RELEVANT HX SPEC: NORMAL
PATH REPORT.TOTAL CANCER: NORMAL

## 2024-12-16 ENCOUNTER — TUMOR BOARD CONFERENCE (OUTPATIENT)
Dept: HEMATOLOGY/ONCOLOGY | Facility: HOSPITAL | Age: 39
End: 2024-12-16
Payer: COMMERCIAL

## 2024-12-16 DIAGNOSIS — C43.4 MALIGNANT MELANOMA OF SCALP (MULTI): Primary | ICD-10-CM

## 2024-12-16 NOTE — TUMOR BOARD NOTE
General Patient Information  Name:  Tasia Garcia  Evaluation #:  2  Conference Date:  12/16/2024  YOB: 1985  MRN:  [id number]  Program Physician(s):  Tevin Anne  Referring Physician(s):  Madelin Caldwell      Summary   Stage:  Annelise ([id number]) ; Melanoma 5 year survival: 99%    Assessment:  Malignant melanoma of the left central frontal scalp, Breslow thickness at least 0.4 mm, moderately transected at the deep margin, without ulceration.     The lack of epidermal attachment raises the possibility of metastatic melanoma.    Of note, outside pathology shows mitotic rate of 2 mm^2, while in-house read does not identify mitotic rate    S/p SLNB prior to Mohs, showing one benign lymph node with parotid (0/1).    Recommendation: Mohs surgery.    Review Multidisciplinary Cutaneous Oncology Conference recommendation with patient.  Continue routine follow up and total body skin exams with Rosio Velazquez.    Follow Up:  Madelin Caldwell.      History and Physical Exam  Dermatologic History:   39 y.o. female with a biopsy of the left central frontal scalp on 10/22/2024 showing malignant melanoma of the left central frontal scalp, Breslow thickness at least 0.4 mm, moderately transected at the deep margin, without ulceration. The lack of epidermal attachment raises the possibility of metastatic melanoma.    S/p SLNB prior to Mohs surgery on 12/5/2024, showing one benign lymph node with parotid (0/1).    Patient is set up for Mohs with Dr. Anne on 1/22    Past Medical History:    Past Medical History:   Diagnosis Date    Melanoma of scalp (Multi)     Personal history of other diseases of the female genital tract     History of infertility         Pathology  Dermatopathology- DERM LAB: [id number]   Collected 12/5/2024 15:15     NODE, LEFT SENTINEL LYMPH NODE #1, LYMPH NODE EXCISION:  BENIGN LYMPH NODE WITH PAROTID.     ** Electronically signed out by Bouchra Ayala MD  **  ___________________________________________________________________________________________________    Derm Consult: [id number]  Order: [id number]   Collected 11/13/2024 10:46       Status: Final result       Visible to patient: Yes (seen)       Dx: Malignant melanoma of scalp and neck ...    0 Result Notes      Component    FINAL DIAGNOSIS   8 SLIDES/1 BLOCK, [facility], #[id number] (BX: 10/22/2024)     SKIN, LEFT CENTRAL FRONTAL SCALP, SHAVE REMOVAL:  MALIGNANT MELANOMA, BRESLOW THICKNESS AT LEAST 0.4 MM, PRESENT ON THE DEEP MARGIN, SEE NOTE.     Note: Microscopic examination reveals a specimen that extends into the superficial dermis. There is a focus of nests of atypical epithelioid cells in the dermis. These cells stain with antibodies against SOX-10 and BAP1. They do not stain significantly with antibodies against p16. There is occasional staining with antibodies against HMB45 and Ki-67 stains a significant proportion of these cells. A PRAME stain stains approximately seventy-five percent of the cells strongly. All control slides stain appropriately.     The lack of epidermal attachment raises the possibility of metastatic melanoma. If it is a primary melanoma it would have features as outlined in the synoptic report.     ** Electronically signed out by Bouchra Ayala MD **         Electronically signed by Bouchra Ayala MD on 11/19/2024 at 1411   Case Summary Report   MELANOMA OF THE SKIN: Biopsy   8th Edition - Protocol posted: 3/23/2022MELANOMA OF THE SKIN: BIOPSY - All Specimens  SPECIMEN   Procedure  Biopsy, shave   Specimen Laterality  Left   TUMOR   Tumor Site  Skin of scalp and neck: Left central frontal scalp        Histologic Type  Primary dermal   Maximum Tumor (Breslow) Thickness (Millimeters)  At least: 0.4 mm     Moderately transected on the deep margin.   Ulceration  Not identified   Anatomic (Flex) Level  At least level: III     Moderately transected on the deep margin.   Mitotic  Rate  None identified   Microsatellite(s)  Not identified   Lymphovascular Invasion  Not identified   Neurotropism  Not identified   Tumor-Infiltrating Lymphocytes  Not identified   Tumor Regression  Not identified   MARGINS     Margin Status for Invasive Melanoma  Invasive melanoma present at margin   Margin(s) Involved by Invasive Melanoma  Deep   Margin Status for Melanoma in situ  Melanoma in situ is not present.   PATHOLOGIC STAGE CLASSIFICATION (pTNM, AJCC 8th Edition)     pT Category  pT1a   .      Clinical History                 Radiology

## 2024-12-17 ENCOUNTER — OFFICE VISIT (OUTPATIENT)
Dept: OTOLARYNGOLOGY | Facility: HOSPITAL | Age: 39
End: 2024-12-17
Payer: COMMERCIAL

## 2024-12-17 DIAGNOSIS — C43.4 MALIGNANT MELANOMA OF SCALP (MULTI): ICD-10-CM

## 2024-12-17 PROCEDURE — 1036F TOBACCO NON-USER: CPT | Performed by: STUDENT IN AN ORGANIZED HEALTH CARE EDUCATION/TRAINING PROGRAM

## 2024-12-17 PROCEDURE — 99211 OFF/OP EST MAY X REQ PHY/QHP: CPT | Performed by: STUDENT IN AN ORGANIZED HEALTH CARE EDUCATION/TRAINING PROGRAM

## 2024-12-17 ASSESSMENT — PATIENT HEALTH QUESTIONNAIRE - PHQ9
1. LITTLE INTEREST OR PLEASURE IN DOING THINGS: NOT AT ALL
2. FEELING DOWN, DEPRESSED OR HOPELESS: NOT AT ALL
SUM OF ALL RESPONSES TO PHQ9 QUESTIONS 1 AND 2: 0

## 2024-12-17 NOTE — PROGRESS NOTES
HEAD AND NECK SURGERY POST OP  [facility]    HPI  I had the pleasure of seeing Tasia Garcia for a post-operative visit today. Patient is s/p SLNBx for left scalp melanoma, pathology benign node    Patient reports she has bene doing well since surgery. No pain. No face issues. Overall happy.     Physical Examination  General: Examination reveals a well-developed, well-nourished patient in no apparent distress.  The patient has no audible dysphonia, stridor or airway distress.  The patient is oriented, alert and responsive.    Face: symmetric movement, pre-auricular incision healing well  Oral Cavity:  The patient is able to open the mouth widely without trismus.  The floor of mouth and oral tongue are soft, and no mucosal abnormalities are noted on the lips or within the oral cavity.  The oral tongue is fully mobile and midline on protrusion.   Oropharynx: There are no mucosal abnormalities noted within the oropharynx.  The soft palate elevates symmetrically, the tonsils and base of tongue are normal to inspection and palpation.       Salivary glands: There are no palpable masses of the parotid or submandibular glands.   Neuro: Cranial nerves 2-12 are without obvious abnormality.  Neck: The neck is soft and symmetric.  There is no palpable adenopathy.    Pathology: left benign lymph node     ASSESSMENT and PLAN:    Left scalp melanoma  - s/p SLNBx, benign node  - set up with Dr Anne in a few weeks for slow mohs of the scalp melanoma  - will obtain PET scan for metastatic component on prior biopsy (ordered previously, will help patient schedule)  - reiterated importance of regular skin checks with derm  - follow up 6 months     Madelin Russell MD

## 2025-01-06 ENCOUNTER — HOSPITAL ENCOUNTER (OUTPATIENT)
Dept: RADIOLOGY | Facility: CLINIC | Age: 40
Discharge: HOME | End: 2025-01-06
Payer: COMMERCIAL

## 2025-01-06 DIAGNOSIS — C43.4 MALIGNANT MELANOMA OF SCALP (MULTI): ICD-10-CM

## 2025-01-06 PROCEDURE — 3430000001 HC RX 343 DIAGNOSTIC RADIOPHARMACEUTICALS: Mod: SE | Performed by: STUDENT IN AN ORGANIZED HEALTH CARE EDUCATION/TRAINING PROGRAM

## 2025-01-06 PROCEDURE — 78816 PET IMAGE W/CT FULL BODY: CPT | Mod: PI

## 2025-01-06 PROCEDURE — A9552 F18 FDG: HCPCS | Mod: SE | Performed by: STUDENT IN AN ORGANIZED HEALTH CARE EDUCATION/TRAINING PROGRAM

## 2025-01-06 PROCEDURE — 78816 PET IMAGE W/CT FULL BODY: CPT | Mod: PET TUMOR INIT TX STRAT | Performed by: RADIOLOGY

## 2025-01-06 RX ORDER — FLUDEOXYGLUCOSE F 18 200 MCI/ML
12.9 INJECTION, SOLUTION INTRAVENOUS
Status: COMPLETED | OUTPATIENT
Start: 2025-01-06 | End: 2025-01-06

## 2025-01-06 RX ADMIN — FLUDEOXYGLUCOSE F 18 12.9 MILLICURIE: 200 INJECTION, SOLUTION INTRAVENOUS at 09:08

## 2025-01-07 DIAGNOSIS — Z12.31 ENCOUNTER FOR SCREENING MAMMOGRAM FOR MALIGNANT NEOPLASM OF BREAST: Primary | ICD-10-CM

## 2025-01-22 ENCOUNTER — APPOINTMENT (OUTPATIENT)
Dept: DERMATOLOGY | Facility: CLINIC | Age: 40
End: 2025-01-22
Payer: COMMERCIAL

## 2025-01-22 DIAGNOSIS — C43.4 MALIGNANT MELANOMA OF SCALP (MULTI): ICD-10-CM

## 2025-01-22 PROCEDURE — 17311 MOHS 1 STAGE H/N/HF/G: CPT | Performed by: DERMATOLOGY

## 2025-01-22 PROCEDURE — G0452 MOLECULAR PATHOLOGY INTERPR: HCPCS | Performed by: DERMATOLOGY

## 2025-01-22 PROCEDURE — 99204 OFFICE O/P NEW MOD 45 MIN: CPT | Performed by: DERMATOLOGY

## 2025-01-22 PROCEDURE — 13121 CMPLX RPR S/A/L 2.6-7.5 CM: CPT | Performed by: DERMATOLOGY

## 2025-01-22 PROCEDURE — 81210 BRAF GENE: CPT | Performed by: DERMATOLOGY

## 2025-01-22 PROCEDURE — 88342 IMHCHEM/IMCYTCHM 1ST ANTB: CPT | Performed by: DERMATOLOGY

## 2025-01-22 NOTE — LETTER
[facility] Provider/Referral Letter Treatment Plan    Patient: Tasia Garcia   YOB: 1985   Date of Visit: 1/22/2025   MRN: [id number]     Rosio Velazquez PA-C  [address] [place] [address]    Dear Rosio Velazquez PA-C,    I had the pleasure of seeing Tasia Garcia today in consultation at your request for evaluation and treatment of:  1. Malignant melanoma of scalp (Multi)  Left Central Frontal Scalp    Mohs surgery    Staff Communication: Dermatology Local Anesthesia: 1.5% Lidocaine with Epinephrine 1:200,000 - Amount: 6.0cc    Specimen 1 - Dermatopathology- DERM LAB  Differential Diagnosis: melanoma  Check Margins Yes/No?:  yes  Comments:  debulk  Dermpath Lab: Routine Histopathology (formalin-fixed tissue)      Mohs surgery was indicated because of the nature of the lesion and the need to obtain the highest cure rate.  After informed consent was obtained, the patient underwent the procedure without complication.    The skin cancer was removed, wound care instructions were given and the patient was advised to follow up with you.  I will see the patient post-operatively as indicated.    Thank you very much for your confidence in me and for allowing me to share in the care of this patient.    1. Malignant melanoma of scalp (Multi)  Left Central Frontal Scalp  Is a 0.8 x 0.8 cm scar    Mohs surgery    Consent obtained: written    Universal Protocol:  Procedure explained and questions answered to patient or proxy's satisfaction: Yes    Test results available and properly labeled: Yes    Pathology report reviewed: Yes    External notes reviewed: Yes    Photo or diagram used for site identification: Yes    Site/side marked: Yes    Slide independently reviewed by Mohs surgeon: Yes    Immediately prior to procedure a time out was called: Yes    Patient identity confirmed: verbally with patient  Preparation: Patient was prepped and draped in usual sterile fashion      Anticoagulation:  Is the patient  taking prescription anticoagulant and/or aspirin prescribed/recommended by a physician? No    Was the anticoagulation regimen changed prior to Mohs? No      Anesthesia:  Anesthesia method: local infiltration  Local anesthetic: lidocaine 1% WITH epi    Procedure Details:  Case ID Number: [id number]  Biopsy accession number: [id number]  Date of biopsy: 11/13/2024  Specimen debulked: Yes (5.0mm)    Pre-Op diagnosis: melanoma  Melanoma subtype: invasive  Melanoma Breslow depth (mm): 0.4  Melanoma high risk features: no high risk features  Surgical site (from skin exam): Left Central Frontal Scalp  Pre-operative length (cm): 0.8  Pre-operative width (cm): 0.8  Indications for Mohs surgery: anatomic location where tissue conservation is critical and aggressive histology  Previously treated? No      Micrographic Surgery Details:  Post-operative length (cm): 1.8  Post-operative width (cm): 1.7  Number of Mohs stages: 1  Indication for sending permanent sections: evaluate a debulking specimen    Stage 1     Comments: The patient was brought into the operating room and placed in the procedure chair in the appropriate position.  The area positive by previous biopsy was identified and confirmed with the patient. The area of clinically obvious tumor was debulked using a curette and/or scalpel as needed. An incision was made following the Mohs approach through the skin. The specimen was taken to the lab, divided into 3 piece(s) and appropriately chromacoded and processed.                 Tumor features identified on Mohs section: no tumor identified      Depth of invasion: subcutaneous fat    Depth of defect: subcutaneous fat    Patient tolerance of procedure: tolerated well, no immediate complications    Reconstruction:  Was the defect reconstructed? Yes    Was reconstruction performed by the same Mohs surgeon? Yes    Setting of reconstruction: outpatient office  When was reconstruction performed? same day  Type of reconstruction:  linear  Linear reconstruction: complex  Length of linear repair (cm): 6  Subcutaneous Layers (Deep Stitches)   Suture size:  3-0 and 4-0  Suture type:  Vicryl  Stitches:  Buried vertical mattress  Fine/surface layer approximation (top stitches)   Epidermal/Superficial suture size:  5-0  Epidermal/Superficial suture type:  Fast-absorbing gut  Stitches: simple running    Hemostasis achieved with: electrodesiccation  Outcome: patient tolerated procedure well with no complications    Post-procedure details: sterile dressing applied and wound care instructions given    Dressing type: pressure dressing    Additional details:  Melanoma Dolores:   Curative Intent: Yes  Original Breslow Thickness: 0.4 mm  Clinical margin width: Other - Mohs, individual anatomic or functional considerations per the NCCN guidelines  Depth of excision: Other - Mohs, individual anatomic or functional considerations per the NCCN guidelines  Discussion/Procedural Comments:       Staff Communication: Dermatology Local Anesthesia: 1.5% Lidocaine with Epinephrine 1:200,000 - Amount: 6.0cc    Specimen 1 - Dermatopathology- DERM LAB  Differential Diagnosis: melanoma  Check Margins Yes/No?:  yes  Comments:  debulk  Dermpath Lab: Routine Histopathology (formalin-fixed tissue)           Sincerely,       Tevin Anne MD  [facility]

## 2025-01-22 NOTE — PROGRESS NOTES
Melanoma Mohs Surgery Consult Note    Date of Surgery:  1/22/2025  Surgeon:  Tevin Anne MD  Office Location:  [address]  [facility] [address]  Dept: [phone]  Dept Fax: [phone]   Referring Provider: Curt Givens MD PhD  [address]  [place] [id number]     Subjective   Tasia Garcia is a 39 y.o. female who presents for the following: MOHS Surgery for melanoma.    According to the patient, the lesion has been present for approximately 6 months at the time of diagnosis.  The lesion is not causing symptoms.  The lesion has not been treated previously.    The patient does not have a pacemaker / defibrillator.  The patient does not have a heart valve / joint replacement.    The patient is not on blood thinners.  The patient does not have a history of hepatitis B or C.  The patient does not have a history of HIV.  The patient does not have a history of immunosuppression (e.g. organ transplantation, malignancy, medications)    The following portions of the chart were reviewed this encounter and updated as appropriate:       Review of Systems:  No other skin or systemic complaints other than what is documented elsewhere in the note.    Medical History:  Clinically relevant history including significant past medical history, review of systems, medications and allergies was reviewed and is documented in Epic.    Objective   Well appearing patient in no apparent distress; mood and affect are within normal limits.  Vital signs: See record.  Noted on the Left Central Frontal Scalp  Is a 0.8 x 0.8 cm scar      The patient confirmed the identified site.    Discussion:  The nature of the diagnosis was explained. The lesion is an early melanoma but is likely to have been present for >1 year and is likely to progress without treatment. The multidisciplinary cutaneous oncology tumor board report was reviewed with the patient and surgery is  recommended.  Warning signs of melanoma were discussed. We recommended that the patient have regular total body skin exams given increased risk of skin cancers. The patient was instructed to use sun protective behaviors including use of broad spectrum sunscreens and sun protective clothing to reduce the risk of skin cancers.     Risks, benefits, side effects of Mohs surgery were discussed with patient and the patient voiced understanding.  It was explained that even though the cure rate of Mohs is very high it is not 100%. Risks of surgery including but not limited to bleeding, infection, numbness, nerve damage, and scar were reviewed.  Discussion included wound care requirements, activity restrictions, likely scar outcome and time to heal.     After Mohs surgery, the defect may need to be repaired surgically and the scar may be longer than the original lesion.  Reconstruction options, risks, and benefits were reviewed including second intention healing, linear repair (4-1 ratio was explained), local flaps, skin grafts, cartilage grafts and interpolation flaps (the need for multiple surgeries was explained). Possible outcomes were reviewed including likely scar appearance, failure of flap survival, infection, bleeding and the need for revision surgery.     Medical Decision Making:    Column 1 - chronic illness with progression  Column 2 - category 3: discussion of management with external physicians (multidisciplinary tumor board)  Column 3 - decision regarding minor surgery with identified risk factors (bleeding, infection, scarring)

## 2025-01-22 NOTE — PROGRESS NOTES
Mohs Surgery Operative Note    Date of Surgery:  1/22/2025  Surgeon:  Tevin Anne MD  Office Location: [address]  [facility] [address]  Dept: [phone]  Dept Fax: [phone]  Referring Provider: Curt Givens MD PhD  [address] [facility]  [address] [facility] [address]  [place] [address]      Assessment/Plan   Pre-procedure:   Obtained informed consent: written from patient  The surgical site was identified and confirmed with the patient.     Intra-operative:   Audible time out called at : 12:31 PM 01/22/25  by: Kimberly Perez RN   Verified patient name, birthdate, site, specimen bottle label & requisition.    The planned procedure(s) was again reviewed with the patient. The risks of bleeding, infection, nerve damage and scarring were reviewed. Written authorization was obtained. The patient identity, surgical site, and planned procedure(s) were verified. The provider acted as both surgeon and pathologist.     Malignant melanoma of scalp (Multi)  Left Central Frontal Scalp    Mohs surgery    Consent obtained: written    Universal Protocol:  Procedure explained and questions answered to patient or proxy's satisfaction: Yes    Test results available and properly labeled: Yes    Pathology report reviewed: Yes    External notes reviewed: Yes    Photo or diagram used for site identification: Yes    Site/side marked: Yes    Slide independently reviewed by Mohs surgeon: Yes    Immediately prior to procedure a time out was called: Yes    Patient identity confirmed: verbally with patient  Preparation: Patient was prepped and draped in usual sterile fashion      Anticoagulation:  Is the patient taking prescription anticoagulant and/or aspirin prescribed/recommended by a physician? No    Was the anticoagulation regimen changed prior to Mohs? No      Anesthesia:  Anesthesia method: local infiltration  Local anesthetic: lidocaine 1% WITH epi    Procedure Details:  Case ID  Number: [id number]  Biopsy accession number: [id number]  Date of biopsy: 11/13/2024  Specimen debulked: Yes (5.0mm)    Pre-Op diagnosis: melanoma  Melanoma subtype: invasive  Melanoma Breslow depth (mm): 0.4mm (broadly transected) s/p SLNBx and imaging  Surgical site (from skin exam): Left Central Frontal Scalp  Pre-operative length (cm): 0.8  Pre-operative width (cm): 0.8  Indications for Mohs surgery: anatomic location where tissue conservation is critical and aggressive histology  Previously treated? No      Micrographic Surgery Details:  Post-operative length (cm): 1.8  Post-operative width (cm): 1.7  Number of Mohs stages: 1  Indication for sending permanent sections: evaluate a debulking specimen    Stage 1     Comments: The patient was brought into the operating room and placed in the procedure chair in the appropriate position.  The area positive by previous biopsy was identified and confirmed with the patient. The area of clinically obvious tumor was debulked using a curette and/or scalpel as needed. An incision was made following the Mohs approach through the skin. The specimen was taken to the lab, divided into 3 piece(s) and appropriately chromacoded and processed.      Debulk:  5mm (will send for permanent)           Tumor features identified on Mohs section: no tumor identified      Depth of invasion: subcutaneous fat    Depth of defect: subcutaneous fat    Patient tolerance of procedure: tolerated well, no immediate complications    Reconstruction:  Was the defect reconstructed? Yes    Was reconstruction performed by the same Mohs surgeon? Yes    Setting of reconstruction: outpatient office  When was reconstruction performed? same day  Type of reconstruction: linear  Linear reconstruction: complex  Length of linear repair (cm): 6  Subcutaneous Layers (Deep Stitches)   Suture size:  3-0 and 4-0  Suture type:  Vicryl  Stitches:  Buried vertical mattress  Fine/surface layer approximation (top stitches)    Epidermal/Superficial suture size:  5-0  Epidermal/Superficial suture type:  Fast-absorbing gut  Stitches: simple running    Hemostasis achieved with: electrodesiccation  Outcome: patient tolerated procedure well with no complications    Post-procedure details: sterile dressing applied and wound care instructions given    Dressing type: pressure dressing    Additional details:  Melanoma Dolores:   Curative Intent: Yes  Original Breslow Thickness: 0.4 mm  Clinical margin width: Other - Mohs, individual anatomic or functional considerations per the NCCN guidelines  Depth of excision: Other - Mohs, individual anatomic or functional considerations per the NCCN guidelines  Discussion/Procedural Comments:       Specimen 1 - Dermatopathology- DERM LAB  Differential Diagnosis: melanoma  Check Margins Yes/No?:  yes  Comments:  debulk  Dermpath Lab: Routine Histopathology (formalin-fixed tissue)        Complex Linear Repair - Wide Undermining:  Given the location and size of the defect, it was determined that a complex layered linear closure was required to restore normal anatomy and function. The repair was considered complex because extensive undermining was required and performed. The amount of undermining performed was greater than the maximum width of the defect as measured perpendicular to the closure line along at least one entire edge of the defect. Standing cutaneous cones were removed using Burow's triangles. The wound edges were brought into close approximation with buried vertical mattress sutures. The remainder of the wound was then closed with epidermal top sutures.        The final repair measured 6.0 cm                Wound care was discussed, and the patient was given written post-operative wound care instructions.      The patient will follow up with Tevin Anne MD as needed for any post operative problems or concerns, and will follow up with their primary dermatologist as scheduled.

## 2025-01-27 ENCOUNTER — HOSPITAL ENCOUNTER (OUTPATIENT)
Dept: RADIOLOGY | Facility: CLINIC | Age: 40
Discharge: HOME | End: 2025-01-27
Payer: COMMERCIAL

## 2025-01-27 ENCOUNTER — TUMOR BOARD CONFERENCE (OUTPATIENT)
Dept: HEMATOLOGY/ONCOLOGY | Facility: HOSPITAL | Age: 40
End: 2025-01-27
Payer: COMMERCIAL

## 2025-01-27 VITALS — BODY MASS INDEX: 34.16 KG/M2 | WEIGHT: 185.63 LBS | HEIGHT: 62 IN

## 2025-01-27 DIAGNOSIS — Z12.31 ENCOUNTER FOR SCREENING MAMMOGRAM FOR MALIGNANT NEOPLASM OF BREAST: ICD-10-CM

## 2025-01-27 LAB
LABORATORY COMMENT REPORT: NORMAL
PATH REPORT.FINAL DX SPEC: NORMAL
PATH REPORT.GROSS SPEC: NORMAL
PATH REPORT.RELEVANT HX SPEC: NORMAL
PATH REPORT.TOTAL CANCER: NORMAL
PATHOLOGY SYNOPTIC REPORT: NORMAL

## 2025-01-27 PROCEDURE — 77063 BREAST TOMOSYNTHESIS BI: CPT | Performed by: RADIOLOGY

## 2025-01-27 PROCEDURE — 77067 SCR MAMMO BI INCL CAD: CPT | Performed by: RADIOLOGY

## 2025-01-27 PROCEDURE — 77067 SCR MAMMO BI INCL CAD: CPT

## 2025-01-27 NOTE — TUMOR BOARD NOTE
General Patient Information  Name:  Tasia Garcia  Evaluation #:  3  Conference Date: 1/27/2025  YOB: 1985  MRN:  [id number]  Program Physician(s):  Tevin Anne  Referring Physician(s):  Rosio Velazquez, Madelin Russell, Tevin Anne, Libby Cardona/Wes Veloz      Summary   Stage: at least cIIA ([id number])   Melanoma 5 year survival: 94%    Assessment:  Malignant melanoma of the left central frontal scalp, Breslow thickness at least 0.4 mm, moderately transected at the deep margin, without ulceration. The lack of epidermal attachment raises the possibility of metastatic melanoma.    S/p SLNB prior to Mohs, showing one benign lymph node with parotid (0/1).    PET-CT shows no evidence of hypermetabolic metastatic disease or lymphadenopathy throughout the body    S/p 1 stage of Mohs, debulk layer approximately 5 mm and sent for permanents, showing malignant melanoma with a Breslow thickness of at least 3.0 mm, present on the deep margin    Recommendation: Referral to medical oncology if Breslow is at least 4 mm or greater (will upstage from IIA to IIB). Consider setting up with  dermatology for care moving forward    Review Multidisciplinary Cutaneous Oncology Conference recommendation with patient.  Continue routine follow up and total body skin exams with Rosio Velazquez.    Follow Up:  Madelin Caldwell, Tevin Anne, Medical Oncology      History and Physical Exam  Dermatologic History:   39 y.o. female with a biopsy of the left central frontal scalp on 10/22/2024 showing malignant melanoma of the left central frontal scalp, Breslow thickness at least 0.4 mm, moderately transected at the deep margin, without ulceration. The lack of epidermal attachment raises the possibility of metastatic melanoma.    S/p SLNB prior to Mohs surgery on 12/5/2024, showing one benign lymph node with parotid (0/1).    PET-CT from 1/6/2025 shows no evidence of hypermetabolic metastatic disease or  lymphadenopathy throughout the body    S/p 1 stage of Mohs on 1/22/2025, debulk layer approximately 5 mm and sent for permanents, showing malignant melanoma with a Breslow thickness of at least 3.0 mm, present on the deep margin    Past Medical History:    Past Medical History:   Diagnosis Date    Dental disease 07/2024    Melanoma of scalp (Multi)     Personal history of other diseases of the female genital tract     History of infertility       Pathology  Dermatopathology- DERM LAB: [id number]   Collected 1/22/2025 11:41     SKIN, LEFT CENTRAL FRONTAL SCALP, EXCISION:  MALIGNANT MELANOMA, BRESLOW THICKNESS AT LEAST 3 MM, PRESENT ON THE DEEP MARGIN, SEE NOTE.     Note: Microscopic examination reveals a specimen that extends into the subcutaneous fat. In the superficial and deep dermis there are nests and fascicles of atypical epithelioid and spindled cells that focally appear to involve the deep margin. There appears to be processing artifact from previous freezing which makes the interpretation of the mitotic rate challenging.     ** Electronically signed out by Bouchra Ayala MD **  __________________________________________________________________________________________________    Dermatopathology- DERM LAB: [id number]   Collected 12/5/2024 15:15     NODE, LEFT SENTINEL LYMPH NODE #1, LYMPH NODE EXCISION:  BENIGN LYMPH NODE WITH PAROTID.     ** Electronically signed out by Bouchra Ayala MD **  ___________________________________________________________________________________________________    Derm Consult: [id number]  Order: [id number]   Collected 11/13/2024 10:46       Status: Final result       Visible to patient: Yes (seen)       Dx: Malignant melanoma of scalp and neck ...    0 Result Notes      Component    FINAL DIAGNOSIS   8 SLIDES/1 BLOCK, [facility], #[id number] (BX: 10/22/2024)     SKIN, LEFT CENTRAL FRONTAL SCALP, SHAVE REMOVAL:  MALIGNANT MELANOMA, BRESLOW THICKNESS AT LEAST 0.4 MM, PRESENT  ON THE DEEP MARGIN, SEE NOTE.     Note: Microscopic examination reveals a specimen that extends into the superficial dermis. There is a focus of nests of atypical epithelioid cells in the dermis. These cells stain with antibodies against SOX-10 and BAP1. They do not stain significantly with antibodies against p16. There is occasional staining with antibodies against HMB45 and Ki-67 stains a significant proportion of these cells. A PRAME stain stains approximately seventy-five percent of the cells strongly. All control slides stain appropriately.     The lack of epidermal attachment raises the possibility of metastatic melanoma. If it is a primary melanoma it would have features as outlined in the synoptic report.     ** Electronically signed out by Bouchra Ayala MD **         Electronically signed by Bouchra Ayala MD on 11/19/2024 at 1411   Case Summary Report   MELANOMA OF THE SKIN: Biopsy   8th Edition - Protocol posted: 3/23/2022MELANOMA OF THE SKIN: BIOPSY - All Specimens  SPECIMEN   Procedure  Biopsy, shave   Specimen Laterality  Left   TUMOR   Tumor Site  Skin of scalp and neck: Left central frontal scalp        Histologic Type  Primary dermal   Maximum Tumor (Breslow) Thickness (Millimeters)  At least: 0.4 mm     Moderately transected on the deep margin.   Ulceration  Not identified   Anatomic (Flex) Level  At least level: III     Moderately transected on the deep margin.   Mitotic Rate  None identified   Microsatellite(s)  Not identified   Lymphovascular Invasion  Not identified   Neurotropism  Not identified   Tumor-Infiltrating Lymphocytes  Not identified   Tumor Regression  Not identified   MARGINS     Margin Status for Invasive Melanoma  Invasive melanoma present at margin   Margin(s) Involved by Invasive Melanoma  Deep   Margin Status for Melanoma in situ  Melanoma in situ is not present.   PATHOLOGIC STAGE CLASSIFICATION (pTNM, AJCC 8th Edition)     pT Category  pT1a   .      Clinical History                  Radiology   NM PET CT MELANOMA  RESTAGING;  1/6/2025 11:04 am      INDICATION:  Signs/Symptoms:malignant melanoma left frontal scalp s/p SLNB and  mohs, evaluate for metastasis.      COMPARISON:  None      ACCESSION NUMBER(S):  [id number]      ORDERING CLINICIAN:  MIRNA SINGH      TECHNIQUE:  DIVISION OF NUCLEAR MEDICINE  POSITRON EMISSION TOMOGRAPHY (PET-CT)      The patient received an intravenous dose of 12.9 mCi of Fluorine-18  fluorodeoxyglucose (FDG).  Positron emission tomographic (PET) images  from vertex of the skull to the feet were then acquired after a one  hour delay. Also acquired was a contemporaneous low dose non-contrast  CT scan performed for attenuation correction of PET images and  anatomic localization.  The PET and CT images were digitally fused  for display.  All images were acquired on a combined PET-CT scanner  unit.  Some areas of FDG accumulation may be described in  standardized uptake value (SUV) units.      CODING:  Initial Treatment Strategy (PI)      CALIBRATION:  Dose Injection-to-Scan Interval (mins): 67 min  Mediastinal bloodpool SUV (normal 1.5-2.5): 2.1  Blood glucose: 98 mg/dL      FINDINGS:  HEAD AND NECK:  No evidence of focal FDG avid lesion in the partially visualized  brain parenchyma, noting that evaluation is limited because of the  expected physiologic diffuse FDG uptake in the brain. No focal FDG  avid  soft tissue lesion is seen in the neck. No FDG avid  cervical  lymphadenopathy is present. No paranasal sinus diease.  Thyroid gland is unremarkable.      CHEST:  No focal FDG avid  lesion is seen in the lung parenchyma.  No evidence of FDG avid mediastinal, hilar or axillary  lymphadenopathy. Mild hypermetabolic activity corresponding to soft  tissue thickening within the left breast with SUV max of 1.6.      ABDOMEN AND PELVIS:  No FDG avid  soft tissue lesion is present in the abdomen and pelvis.  No evidence of FDG avid  lymphadenopathy.  Physiologic  radiotracer uptake is present in the liver and spleen  with excretion into the bowel loops and the genitourinary tract.      MUSCULOSKELETAL:  No focal FDG avid  lesion is seen in the axial or appendicular to  suggest osseous metastasis. Mild hypermetabolic activity  corresponding to left frontoparietal scalp with SUV max of 3.0.      IMPRESSION:  1. Mild cutaneous hypermetabolic activity corresponding to soft  tissue and skin thickening seen at the left frontoparietal scalp,  likely represents recent postsurgical changes.  2. No evidence of hypermetabolic metastatic disease or  lymphadenopathy throughout the body.  3. Mild hypermetabolic activity corresponding to soft tissue  thickening within the left breast with SUV max of 1.6. Finding is  nonspecific, but correlation with current mammography may be of value  for further evaluation.    Clinical Images  1/22/2025

## 2025-02-03 DIAGNOSIS — C43.4 MALIGNANT MELANOMA OF SCALP (MULTI): Primary | ICD-10-CM

## 2025-02-03 LAB
LABORATORY COMMENT REPORT: NORMAL
PATH REPORT.ADDENDUM SPEC: NORMAL
PATH REPORT.FINAL DX SPEC: NORMAL
PATH REPORT.GROSS SPEC: NORMAL
PATH REPORT.RELEVANT HX SPEC: NORMAL
PATH REPORT.TOTAL CANCER: NORMAL
PATHOLOGY SYNOPTIC REPORT: NORMAL

## 2025-02-10 ENCOUNTER — TUMOR BOARD CONFERENCE (OUTPATIENT)
Dept: HEMATOLOGY/ONCOLOGY | Facility: HOSPITAL | Age: 40
End: 2025-02-10
Payer: COMMERCIAL

## 2025-02-10 NOTE — TUMOR BOARD NOTE
General Patient Information  Name:  Tasia Garcia  Evaluation #:  4  Conference Date: 2/10/2025  YOB: 1985  MRN:  [id number]  Program Physician(s):  Tevin Anne  Referring Physician(s):  Rosio Velazquez, Madelin Russell, Tevin Anne, Libby Cardona/Wes Veloz      Summary   Stage: at least cIIB ([id number])   Melanoma 5 year survival: 87%    Assessment:  Malignant melanoma of the left central frontal scalp, Breslow thickness at least 0.4 mm, moderately transected at the deep margin, without ulceration. The lack of epidermal attachment raises the possibility of metastatic melanoma.    S/p SLNB prior to Mohs, showing one benign lymph node with parotid (0/1).    PET-CT shows no evidence of hypermetabolic metastatic disease or lymphadenopathy throughout the body    S/p 1 stage of Mohs, debulk layer approximately 5 mm and sent for permanents, showing malignant melanoma with a Breslow of at least 5 mm, broadly transected on the deep margin    Recommendation: Referral to medical oncology for consideration of systemic therapy (upstaged to IIB). Re-excision with additional 1 cm margin for a total of 2 cm margins    Review Multidisciplinary Cutaneous Oncology Conference recommendation with patient.  Continue routine follow up and total body skin exams with Rosio Velazquez.    Follow Up:  Rosio Velazquez, Madelin Russell, Tevin Anne, Medical Oncology      History and Physical Exam  Dermatologic History:   39 y.o. female with a biopsy of the left central frontal scalp on 10/22/2024 showing malignant melanoma of the left central frontal scalp, Breslow thickness at least 0.4 mm, moderately transected at the deep margin, without ulceration. The lack of epidermal attachment raises the possibility of metastatic melanoma.    S/p SLNB prior to Mohs surgery on 12/5/2024, showing one benign lymph node with parotid (0/1).    PET-CT from 1/6/2025 shows no evidence of hypermetabolic metastatic disease or lymphadenopathy  throughout the body    S/p 1 stage of Mohs on 1/22/2025, debulk layer approximately 5 mm and sent for permanents, showing malignant melanoma with a Breslow of at least 5 mm, broadly transected on the deep margin    Past Medical History:    Past Medical History:   Diagnosis Date    Dental disease 07/2024    Melanoma of scalp (Multi)     Personal history of other diseases of the female genital tract     History of infertility       Pathology  Dermatopathology- DERM LAB: [id number]   Collected 1/22/2025 11:41     SKIN, LEFT CENTRAL FRONTAL SCALP, EXCISION:  MALIGNANT MELANOMA, BRESLOW THICKNESS AT LEAST 3 MM, PRESENT ON THE DEEP MARGIN, SEE NOTE.     Note: Microscopic examination reveals a specimen that extends into the subcutaneous fat. In the superficial and deep dermis there are nests and fascicles of atypical epithelioid and spindled cells that focally appear to involve the deep margin. There appears to be processing artifact from previous freezing which makes the interpretation of the mitotic rate challenging.     ** Electronically signed out by Bouchra Ayala MD **    Addendum   The frozen sections of the Mohs debulk ([id number]) was reviewed and reveals a Breslow depth of at least 5 mm with broad transection across the deep margin. These sections reveal a different plane of section compared to the internal case signed out above and with formalin fixed paraffin embedded tissue there may be shrinkage compared with a frozen section.        SPECIMEN   Procedure  Re-excision   Specimen Laterality  Left   TUMOR   Tumor Site  Skin of scalp and neck: Left central frontal scalp        Histologic Type  Primary dermal   Maximum Tumor (Breslow) Thickness (Millimeters)  At least: 3 mm     Transected on the deep margin.   Macroscopic Satellite Nodule(s)  Not identified   Ulceration  Not identified   Anatomic (Flex) Level  At least level: IV     Transected on the deep margin.   Mitotic Rate  1 mitoses per mm2   Microsatellite(s)  Not  identified   Lymphovascular Invasion  Not identified   Neurotropism  Not identified   Tumor-Infiltrating Lymphocytes  Present, nonbrisk   Tumor Regression  Not identified   MARGINS     Margin Status for Invasive Melanoma  Invasive melanoma present at margin   Margin(s) Involved by Invasive Melanoma  Deep   Margin Status for Melanoma in situ  Melanoma in situ is not present in this specimen.   REGIONAL LYMPH NODES     Regional Lymph Node Status  All regional lymph nodes negative for tumor   Total Number of Lymph Nodes Examined  1   Number of Bledsoe Nodes Examined  1   PATHOLOGIC STAGE CLASSIFICATION (pTNM, AJCC 8th Edition)     pT Category  pT3a   pN Category  pN0     __________________________________________________________________________________________________    Dermatopathology- DERM LAB: [id number]   Collected 12/5/2024 15:15     NODE, LEFT SENTINEL LYMPH NODE #1, LYMPH NODE EXCISION:  BENIGN LYMPH NODE WITH PAROTID.     ** Electronically signed out by Bouchra Ayala MD **  ___________________________________________________________________________________________________    Derm Consult: [id number]  Order: [id number]   Collected 11/13/2024 10:46       Status: Final result       Visible to patient: Yes (seen)       Dx: Malignant melanoma of scalp and neck ...    0 Result Notes      Component    FINAL DIAGNOSIS   8 SLIDES/1 BLOCK, [facility], #[id number] (BX: 10/22/2024)     SKIN, LEFT CENTRAL FRONTAL SCALP, SHAVE REMOVAL:  MALIGNANT MELANOMA, BRESLOW THICKNESS AT LEAST 0.4 MM, PRESENT ON THE DEEP MARGIN, SEE NOTE.     Note: Microscopic examination reveals a specimen that extends into the superficial dermis. There is a focus of nests of atypical epithelioid cells in the dermis. These cells stain with antibodies against SOX-10 and BAP1. They do not stain significantly with antibodies against p16. There is occasional staining with antibodies against HMB45 and Ki-67 stains a significant proportion of  these cells. A PRAME stain stains approximately seventy-five percent of the cells strongly. All control slides stain appropriately.     The lack of epidermal attachment raises the possibility of metastatic melanoma. If it is a primary melanoma it would have features as outlined in the synoptic report.     ** Electronically signed out by Bouchra Ayala MD **         Electronically signed by Bouchra Ayala MD on 11/19/2024 at 1411   Case Summary Report   MELANOMA OF THE SKIN: Biopsy   8th Edition - Protocol posted: 3/23/2022MELANOMA OF THE SKIN: BIOPSY - All Specimens  SPECIMEN   Procedure  Biopsy, shave   Specimen Laterality  Left   TUMOR   Tumor Site  Skin of scalp and neck: Left central frontal scalp        Histologic Type  Primary dermal   Maximum Tumor (Breslow) Thickness (Millimeters)  At least: 0.4 mm     Moderately transected on the deep margin.   Ulceration  Not identified   Anatomic (Flex) Level  At least level: III     Moderately transected on the deep margin.   Mitotic Rate  None identified   Microsatellite(s)  Not identified   Lymphovascular Invasion  Not identified   Neurotropism  Not identified   Tumor-Infiltrating Lymphocytes  Not identified   Tumor Regression  Not identified   MARGINS     Margin Status for Invasive Melanoma  Invasive melanoma present at margin   Margin(s) Involved by Invasive Melanoma  Deep   Margin Status for Melanoma in situ  Melanoma in situ is not present.   PATHOLOGIC STAGE CLASSIFICATION (pTNM, AJCC 8th Edition)     pT Category  pT1a   .      Clinical History                 Radiology   NM PET CT MELANOMA  RESTAGING;  1/6/2025 11:04 am      INDICATION:  Signs/Symptoms:malignant melanoma left frontal scalp s/p SLNB and  mohs, evaluate for metastasis.      COMPARISON:  None      ACCESSION NUMBER(S):  [id number]      ORDERING CLINICIAN:  MIRNA SINGH      TECHNIQUE:  DIVISION OF NUCLEAR MEDICINE  POSITRON EMISSION TOMOGRAPHY (PET-CT)      The patient received an intravenous dose  of 12.9 mCi of Fluorine-18  fluorodeoxyglucose (FDG).  Positron emission tomographic (PET) images  from vertex of the skull to the feet were then acquired after a one  hour delay. Also acquired was a contemporaneous low dose non-contrast  CT scan performed for attenuation correction of PET images and  anatomic localization.  The PET and CT images were digitally fused  for display.  All images were acquired on a combined PET-CT scanner  unit.  Some areas of FDG accumulation may be described in  standardized uptake value (SUV) units.      CODING:  Initial Treatment Strategy (PI)      CALIBRATION:  Dose Injection-to-Scan Interval (mins): 67 min  Mediastinal bloodpool SUV (normal 1.5-2.5): 2.1  Blood glucose: 98 mg/dL      FINDINGS:  HEAD AND NECK:  No evidence of focal FDG avid lesion in the partially visualized  brain parenchyma, noting that evaluation is limited because of the  expected physiologic diffuse FDG uptake in the brain. No focal FDG  avid  soft tissue lesion is seen in the neck. No FDG avid  cervical  lymphadenopathy is present. No paranasal sinus diease.  Thyroid gland is unremarkable.      CHEST:  No focal FDG avid  lesion is seen in the lung parenchyma.  No evidence of FDG avid mediastinal, hilar or axillary  lymphadenopathy. Mild hypermetabolic activity corresponding to soft  tissue thickening within the left breast with SUV max of 1.6.      ABDOMEN AND PELVIS:  No FDG avid  soft tissue lesion is present in the abdomen and pelvis.  No evidence of FDG avid  lymphadenopathy.  Physiologic radiotracer uptake is present in the liver and spleen  with excretion into the bowel loops and the genitourinary tract.      MUSCULOSKELETAL:  No focal FDG avid  lesion is seen in the axial or appendicular to  suggest osseous metastasis. Mild hypermetabolic activity  corresponding to left frontoparietal scalp with SUV max of 3.0.      IMPRESSION:  1. Mild cutaneous hypermetabolic activity corresponding to soft  tissue  and skin thickening seen at the left frontoparietal scalp,  likely represents recent postsurgical changes.  2. No evidence of hypermetabolic metastatic disease or  lymphadenopathy throughout the body.  3. Mild hypermetabolic activity corresponding to soft tissue  thickening within the left breast with SUV max of 1.6. Finding is  nonspecific, but correlation with current mammography may be of value  for further evaluation.    Clinical Images  1/22/2025

## 2025-02-11 ENCOUNTER — TELEPHONE (OUTPATIENT)
Dept: HEMATOLOGY/ONCOLOGY | Facility: HOSPITAL | Age: 40
End: 2025-02-11

## 2025-02-11 ENCOUNTER — TELEMEDICINE (OUTPATIENT)
Dept: OTOLARYNGOLOGY | Facility: HOSPITAL | Age: 40
End: 2025-02-11
Payer: COMMERCIAL

## 2025-02-11 DIAGNOSIS — C43.4 MALIGNANT MELANOMA OF SCALP (MULTI): Primary | ICD-10-CM

## 2025-02-11 PROCEDURE — 1036F TOBACCO NON-USER: CPT | Performed by: STUDENT IN AN ORGANIZED HEALTH CARE EDUCATION/TRAINING PROGRAM

## 2025-02-11 PROCEDURE — 99024 POSTOP FOLLOW-UP VISIT: CPT | Performed by: STUDENT IN AN ORGANIZED HEALTH CARE EDUCATION/TRAINING PROGRAM

## 2025-02-11 ASSESSMENT — PATIENT HEALTH QUESTIONNAIRE - PHQ9
1. LITTLE INTEREST OR PLEASURE IN DOING THINGS: NOT AT ALL
2. FEELING DOWN, DEPRESSED OR HOPELESS: NOT AT ALL
SUM OF ALL RESPONSES TO PHQ9 QUESTIONS 1 & 2: 0

## 2025-02-11 NOTE — PROGRESS NOTES
HEAD AND NECK SURGERY FOLLOW UP  [facility] [place]    Virtual or Telephone Consent    An interactive audio and video telecommunication system which permits real time communications between the patient (at the originating site) and provider (at the distant site) was utilized to provide this telehealth service.   Verbal consent was requested and obtained from Tasia Garcia on this date, 02/11/25 for a telehealth visit.     HPI  I had the pleasure of seeing Tasia Garcia for a follow up visit today.     Treatment History:  - 11/13/24 dermatology review biopsy left scalp, 0.4 mm depth with no ulceration, transected at deep margin, possible metastatic  - 12/5/24 s/p SLNBx, 1 lymph node negative for malignancy  - 12/17/24 PET scan with known scalp FDG avidity, ?breast avidity (follow up mammogram unremarkable)  - 1/22/25 s/p mohs left scalp melanoma, depth 5 mm, negative margins ([place])    Patient doing well since her mohs procedure. Scar is healing well. Discussed at cutaneous tumor board, recommendation for referral to medical oncology given now T4a tumor and consideration of re-excision for 2 cm circumferential scar.     Physical Examination  General: Examination reveals a well-developed, well-nourished patient in no apparent distress.    Voice: The patient has no audible dysphonia  Respiratory: no stridor or airway distress.    Neuro: The patient is oriented, alert and responsive.    Face: symmetric movement, pre-auricular incision healed  Scalp: mohs incision healed  Neck: symmetric, full range of motoin    ASSESSMENT and PLAN:    Left scalp melanoma, [id number]  - discussed treatment course and pathology to date, reviewed Tumor board recommendations at length  - recommend referral to medical oncology, this was placed today  - discussed risks and benefits of re-excision of the scar. This would be in the operating room, risks including pain, bleeding, infection, scar, need for further procedures, damage to  surrounding structures, unwanted cosmetic outcome, risks of anesthesia. She would like to defer further excision at this time  - reiterated importance of regular skin checks with derm  - follow up with me in 2-3 months for exam     Madelin Russell MD    24 modifier used for significant discussion of coordination of cancer care

## 2025-02-11 NOTE — TELEPHONE ENCOUNTER
Called patient to update appointment time tomorrow 2/12 to 3:00pm instead of 1:00pm. Patient agreeable with new time and appreciative of call.

## 2025-02-12 ENCOUNTER — PATIENT OUTREACH (OUTPATIENT)
Dept: HEMATOLOGY/ONCOLOGY | Facility: CLINIC | Age: 40
End: 2025-02-12

## 2025-02-12 ENCOUNTER — OFFICE VISIT (OUTPATIENT)
Dept: HEMATOLOGY/ONCOLOGY | Facility: CLINIC | Age: 40
End: 2025-02-12
Payer: COMMERCIAL

## 2025-02-12 VITALS
TEMPERATURE: 96.1 F | SYSTOLIC BLOOD PRESSURE: 124 MMHG | HEIGHT: 61 IN | RESPIRATION RATE: 16 BRPM | HEART RATE: 79 BPM | OXYGEN SATURATION: 97 % | BODY MASS INDEX: 36.44 KG/M2 | WEIGHT: 193 LBS | DIASTOLIC BLOOD PRESSURE: 88 MMHG

## 2025-02-12 DIAGNOSIS — C43.4 MALIGNANT MELANOMA OF SCALP (MULTI): Primary | ICD-10-CM

## 2025-02-12 PROCEDURE — 1036F TOBACCO NON-USER: CPT | Performed by: INTERNAL MEDICINE

## 2025-02-12 PROCEDURE — 99215 OFFICE O/P EST HI 40 MIN: CPT | Performed by: INTERNAL MEDICINE

## 2025-02-12 PROCEDURE — 99205 OFFICE O/P NEW HI 60 MIN: CPT | Performed by: INTERNAL MEDICINE

## 2025-02-12 PROCEDURE — 3008F BODY MASS INDEX DOCD: CPT | Performed by: INTERNAL MEDICINE

## 2025-02-12 SDOH — ECONOMIC STABILITY: FOOD INSECURITY: WITHIN THE PAST 12 MONTHS, THE FOOD YOU BOUGHT JUST DIDN'T LAST AND YOU DIDN'T HAVE MONEY TO GET MORE.: NEVER TRUE

## 2025-02-12 SDOH — HEALTH STABILITY: PHYSICAL HEALTH
HOW OFTEN DO YOU NEED TO HAVE SOMEONE HELP YOU WHEN YOU READ INSTRUCTIONS, PAMPHLETS, OR OTHER WRITTEN MATERIAL FROM YOUR DOCTOR OR PHARMACY?: NEVER

## 2025-02-12 SDOH — ECONOMIC STABILITY: INCOME INSECURITY: IN THE LAST 12 MONTHS, WAS THERE A TIME WHEN YOU WERE NOT ABLE TO PAY THE MORTGAGE OR RENT ON TIME?: NO

## 2025-02-12 SDOH — ECONOMIC STABILITY: FOOD INSECURITY: WITHIN THE PAST 12 MONTHS, YOU WORRIED THAT YOUR FOOD WOULD RUN OUT BEFORE YOU GOT MONEY TO BUY MORE.: NEVER TRUE

## 2025-02-12 SDOH — HEALTH STABILITY: PHYSICAL HEALTH: ON AVERAGE, HOW MANY DAYS PER WEEK DO YOU ENGAGE IN MODERATE TO STRENUOUS EXERCISE (LIKE A BRISK WALK)?: 4 DAYS

## 2025-02-12 SDOH — HEALTH STABILITY: PHYSICAL HEALTH: ON AVERAGE, HOW MANY MINUTES DO YOU ENGAGE IN EXERCISE AT THIS LEVEL?: 0 MIN

## 2025-02-12 ASSESSMENT — NCCN CANCER DISTRESS MANAGEMENT: NCCN EMOTIONAL CONCERNS: 4

## 2025-02-12 ASSESSMENT — SOCIAL DETERMINANTS OF HEALTH (SDOH)
DO YOU BELONG TO ANY CLUBS OR ORGANIZATIONS SUCH AS CHURCH GROUPS UNIONS, FRATERNAL OR ATHLETIC GROUPS, OR SCHOOL GROUPS?: YES
HOW OFTEN DO YOU GET TOGETHER WITH FRIENDS OR RELATIVES?: MORE THAN THREE TIMES A WEEK
IN A TYPICAL WEEK, HOW MANY TIMES DO YOU TALK ON THE PHONE WITH FAMILY, FRIENDS, OR NEIGHBORS?: MORE THAN THREE TIMES A WEEK
HOW OFTEN DO YOU ATTENT MEETINGS OF THE CLUB OR ORGANIZATION YOU BELONG TO?: MORE THAN 4 TIMES PER YEAR
WITHIN THE LAST YEAR, HAVE YOU BEEN KICKED, HIT, SLAPPED, OR OTHERWISE PHYSICALLY HURT BY YOUR PARTNER OR EX-PARTNER?: NO
WITHIN THE LAST YEAR, HAVE YOU BEEN AFRAID OF YOUR PARTNER OR EX-PARTNER?: NO
WITHIN THE LAST YEAR, HAVE TO BEEN RAPED OR FORCED TO HAVE ANY KIND OF SEXUAL ACTIVITY BY YOUR PARTNER OR EX-PARTNER?: NO
WITHIN THE LAST YEAR, HAVE YOU BEEN HUMILIATED OR EMOTIONALLY ABUSED IN OTHER WAYS BY YOUR PARTNER OR EX-PARTNER?: NO
HOW OFTEN DO YOU ATTEND CHURCH OR RELIGIOUS SERVICES?: MORE THAN 4 TIMES PER YEAR
IN THE PAST 12 MONTHS, HAS THE ELECTRIC, GAS, OIL, OR WATER COMPANY THREATENED TO SHUT OFF SERVICE IN YOUR HOME?: NO
HOW HARD IS IT FOR YOU TO PAY FOR THE VERY BASICS LIKE FOOD, HOUSING, MEDICAL CARE, AND HEATING?: NOT VERY HARD

## 2025-02-12 ASSESSMENT — PAIN SCALES - GENERAL: PAINLEVEL_OUTOF10: 0-NO PAIN

## 2025-02-12 ASSESSMENT — LIFESTYLE VARIABLES
SKIP TO QUESTIONS 9-10: 0
HOW OFTEN DO YOU HAVE A DRINK CONTAINING ALCOHOL: 2-4 TIMES A MONTH
AUDIT-C TOTAL SCORE: 3
HOW OFTEN DO YOU HAVE SIX OR MORE DRINKS ON ONE OCCASION: LESS THAN MONTHLY
HOW MANY STANDARD DRINKS CONTAINING ALCOHOL DO YOU HAVE ON A TYPICAL DAY: PATIENT DOES NOT DRINK

## 2025-02-12 ASSESSMENT — PATIENT HEALTH QUESTIONNAIRE - PHQ9
1. LITTLE INTEREST OR PLEASURE IN DOING THINGS: NEARLY EVERY DAY
2. FEELING DOWN, DEPRESSED OR HOPELESS: NOT AT ALL
SUM OF ALL RESPONSES TO PHQ9 QUESTIONS 1 & 2: 3

## 2025-02-12 ASSESSMENT — ENCOUNTER SYMPTOMS
LOSS OF SENSATION IN FEET: 0
OCCASIONAL FEELINGS OF UNSTEADINESS: 0
DEPRESSION: 1

## 2025-02-12 NOTE — PATIENT INSTRUCTIONS
Review clinical trial consent form  Review Immunotherapy pamphlets  Review Keytruda/pembrolizumab information  STAT MRI Brain  Phone visit on 2/20/25 with Dr. Veloz

## 2025-02-13 ENCOUNTER — TELEMEDICINE (OUTPATIENT)
Dept: PRIMARY CARE | Facility: CLINIC | Age: 40
End: 2025-02-13
Payer: COMMERCIAL

## 2025-02-13 ENCOUNTER — HOSPITAL ENCOUNTER (OUTPATIENT)
Dept: RADIOLOGY | Facility: CLINIC | Age: 40
Discharge: HOME | End: 2025-02-13
Payer: COMMERCIAL

## 2025-02-13 DIAGNOSIS — C43.4 MALIGNANT MELANOMA OF SCALP (MULTI): Primary | ICD-10-CM

## 2025-02-13 DIAGNOSIS — C43.4 MALIGNANT MELANOMA OF SCALP (MULTI): ICD-10-CM

## 2025-02-13 PROCEDURE — 70553 MRI BRAIN STEM W/O & W/DYE: CPT

## 2025-02-13 PROCEDURE — A9575 INJ GADOTERATE MEGLUMI 0.1ML: HCPCS | Mod: SE | Performed by: INTERNAL MEDICINE

## 2025-02-13 PROCEDURE — 2550000001 HC RX 255 CONTRASTS: Mod: SE | Performed by: INTERNAL MEDICINE

## 2025-02-13 PROCEDURE — 99213 OFFICE O/P EST LOW 20 MIN: CPT | Performed by: INTERNAL MEDICINE

## 2025-02-13 RX ORDER — GADOTERATE MEGLUMINE 376.9 MG/ML
18 INJECTION INTRAVENOUS
Status: COMPLETED | OUTPATIENT
Start: 2025-02-13 | End: 2025-02-13

## 2025-02-13 RX ADMIN — GADOTERATE MEGLUMINE 18 ML: 376.9 INJECTION INTRAVENOUS at 15:50

## 2025-02-13 ASSESSMENT — ENCOUNTER SYMPTOMS
FREQUENCY: 0
HEMOPTYSIS: 0
PALPITATIONS: 0
ADENOPATHY: 0
BACK PAIN: 0
DIFFICULTY URINATING: 0
HEMATURIA: 0
EYE PROBLEMS: 0
FEVER: 0
ABDOMINAL PAIN: 0
CHEST TIGHTNESS: 0
SORE THROAT: 0
APPETITE CHANGE: 0
DYSURIA: 0
EXTREMITY WEAKNESS: 0
CONFUSION: 0
SHORTNESS OF BREATH: 0
TROUBLE SWALLOWING: 0
NERVOUS/ANXIOUS: 0
NAUSEA: 0
CONSTIPATION: 0
DIARRHEA: 0
COUGH: 0
VOMITING: 0
FATIGUE: 0
SCLERAL ICTERUS: 0
FLANK PAIN: 0
DEPRESSION: 0
DIZZINESS: 0
SEIZURES: 0
LEG SWELLING: 0
CHILLS: 0
MYALGIAS: 0
HOT FLASHES: 0

## 2025-02-13 ASSESSMENT — VISUAL ACUITY: OU: 1

## 2025-02-13 NOTE — PROGRESS NOTES
Subjective   Tasia Garcia is a 39 y.o. female who presents for a follow-up virtual visit.  HPI  39-year-old female, recently diagnosed with malignant melanoma stage cIIB over the left central frontal scalp Breslow thickness at least 0.4 mm, patient is following with  dermatology and was referred to oncology and was referred.  Metastatic workup is negative at this time, which included a sentinel lymph node biopsy PET scan MRI brain is pending.  Review of Systems  10 system review with patient pertinent as.  Objective     There were no vitals taken for this visit.       Physical Exam  Virtual visit  Assessment/Plan     Virtual visit    Malignant melanoma stage Iib  Following with dermatology  Following with oncology  We spoke about patient's concern  Including various treatment modalities  Patient is a very good team around her  Including dermatology oncology  Patient was reassured  To follow recommendations.  Patient voiced full understanding  Has an appointment to follow-up with oncology        Is here for follow-up, and fasting blood work    Cbc BMP LIPID AST ALT Vitamin d 25    Patient is active    Continue with the low-fat, low-cholesterol diet,  I recommended Mediterranean diet, which include fish, chicken, vegetables and olive oil  Exercise daily for 30 minutes at least 3 times a week  Continue home medications      Follows with Gyn  Dr Bianca Pool  Pap up to date    Mammogram age 41    Fasting blood work    CBC BMP lipids AST ALT vitamin D    Vaccination  Flu vaccine 2024    BMI 33.65 kg/m swq  39.04 kg/msq  Life style modification  Exercise 30 min 4 X week     Yearaly abdominal us  Fhx of Pancreatic cancer  Mother  age 57          Problem List Items Addressed This Visit    None          Arya Ku MD

## 2025-02-14 NOTE — PROGRESS NOTES
".Patient ID: Tasia Garcia is a 39 y.o. female.  Referring Physician: Madelin Russell MD  [id number] [place] [address]  Primary Care Provider: Arya Ku MD     Chief Complaint   Patient presents with    New Patient Visit     \" I have melanoma in my scalp\"    Skin Cancer     Stage IIb melanoma of the scalp      Oncology History Overview Note   Ms. Tasia Garcia is diagnosed with stage IIb melanoma of the scalp.  She first presented to ENT oncology in November 2020 for and underwent wide local excision and sentinel lymph node biopsy on 12/5/2024 which showed a 3 mm deep cutaneous melanoma positive on the margins.  The sentinel lymph node was traced to the parotid lymph node which was negative for an deposit.  Since the margin was positive, the patient was referred for Mohs surgery which he completed on 1/22/2025 with Dr. Tevin Anne and a negative margin was achieved.  The final Breslow depth was labeled at 5 mm, no ulceration, no LVI-pT4a melanoma.  PET/CT scan on 1/6/2025 was negative for any distant metastatic deposits.  MRI brain on 2/13/2025 was negative for intracranial deposits.     Malignant melanoma of scalp (Multi)   11/15/2024 Initial Diagnosis    Referring Surgeon: Enzo Russell and Tevin Anne   Dermatologist: Rosio Velazquez   Date of first meeting with our team: 02/12/2025    Date of First meeting with surgical team: 11/12/2024  Melanoma type: Cutaneous  Location of primary site: Scalp-primary dermal  Date of WLE and SLNB: Initial resection on 12/5/2024 (Dr. Madelin Russell) followed by Mohs surgery (Dr. Tevin Anne)  Final pathology: Breslow Depth-5 mm; Ulceration status-negative; LVI-negative; Other high risk features-negative  Penn lymph node status: 1 node examined in the parotid gland-found to be negative for malignancy  Final Stage: pT4a N0 M0-stage IIb melanoma- AJCC 8th Ed.    BRAF status: Not known  Initial MRI brain with and without contrast: " "2/13/2025-unremarkable  Initial Full body PET scan: 01/6/2025-unremarkable       1/1/2025 Cancer Staged    Staging form: Melanoma of the Skin, AJCC 8th Edition, Pathologic stage from 1/1/2025: Stage IIB (pT4a, pN0, cM0) - Signed by Wes Veloz MD on 2/13/2025        HPI    Subjective   Please refer to \"Notes/Cancer History\" above for complete History of present illness.     Ms Tasia Garcia is diagnosed with stage IIb melanoma.  She is here with her sister who is a practicing nurse.  They want to understand the diagnosis, staging, prognosis, treatment and surveillance studies.  She denies unintentional weight loss, nausea, vomiting, fevers, pathological night sweats or changes in appetite. No recent changes in bowel or bladder habit.      Review of Systems:   Review of Systems   Constitutional:  Negative for appetite change, chills, fatigue and fever.   HENT:   Negative for hearing loss, lump/mass, mouth sores, sore throat and trouble swallowing.    Eyes:  Negative for eye problems and icterus.   Respiratory:  Negative for chest tightness, cough, hemoptysis and shortness of breath.    Cardiovascular:  Negative for chest pain, leg swelling and palpitations.   Gastrointestinal:  Negative for abdominal pain, constipation, diarrhea, nausea and vomiting.   Endocrine: Negative for hot flashes.   Genitourinary:  Negative for difficulty urinating, dysuria, frequency and hematuria.    Musculoskeletal:  Negative for back pain, flank pain, gait problem and myalgias.   Skin:  Negative for itching and rash.   Neurological:  Negative for dizziness, extremity weakness, gait problem and seizures.   Hematological:  Negative for adenopathy.   Psychiatric/Behavioral:  Negative for confusion and depression. The patient is not nervous/anxious.          MEDICAL HISTORY  Past Medical History:   Diagnosis Date    Dental disease 07/2024    Melanoma of scalp (Multi)     Personal history of other diseases of the female genital tract     " "History of infertility       FAMILY HISTORY  Family History   Problem Relation Name Age of Onset    Other (cervical carcinoma) Mother Kimberly fallon     Cancer Mother Kimberly fallon     Stroke Mother Kimberly fallon     Ovarian cancer Mother Kimberly fallon 45    Cerebral palsy Sister      Breast cancer Paternal Grandmother Laura Fallon         AGE UNKNOWN       TOBACCO HISTORY  Tobacco Use: Low Risk  (2/13/2025)    Patient History     Smoking Tobacco Use: Never     Smokeless Tobacco Use: Never     Passive Exposure: Never       SOCIAL HISTORY  Social Connections: Moderately Integrated (2/12/2025)    Social Connection and Isolation Panel [NHANES]     Frequency of Communication with Friends and Family: More than three times a week     Frequency of Social Gatherings with Friends and Family: More than three times a week     Attends Religion Services: More than 4 times per year     Active Member of Clubs or Organizations: Yes     Attends Club or Organization Meetings: More than 4 times per year     Marital Status:    Recent Concern: Social Connections - At Risk (1/25/2025)    Received from [employer]    Social Connections     Connectedness: 2        Outpatient Medication Profile:  Current Outpatient Medications on File Prior to Visit   Medication Sig Dispense Refill    prenatal vit,calc76-iron-folic (Prenatabs Rx) 29 mg iron- 1 mg tablet Take 1 tablet by mouth once daily.       No current facility-administered medications on file prior to visit.         Performance Status:  Asymptomatic     Vitals and Measurements:   /88 (BP Location: Left arm, Patient Position: Sitting, BP Cuff Size: Large adult)   Pulse 79   Temp 35.6 °C (96.1 °F) (Core)   Resp 16   Ht 1.551 m (5' 1.06\")   Wt 87.5 kg (193 lb)   SpO2 97%   BMI 36.39 kg/m²       Physical Exam:   Physical Exam  Constitutional:       General: She is awake.      Appearance: Normal appearance. She is well-developed.   HENT:      Head: Normocephalic and atraumatic. "      Comments: Linear scar seen on the left side of the scalp-well-healed  Eyes:      General: Lids are normal. Vision grossly intact.   Neck:      Thyroid: No thyroid mass.   Cardiovascular:      Rate and Rhythm: Normal rate and regular rhythm.      Heart sounds: Normal heart sounds, S1 normal and S2 normal.   Pulmonary:      Effort: Pulmonary effort is normal.      Breath sounds: Normal breath sounds and air entry. No decreased breath sounds.   Abdominal:      General: Abdomen is flat. Bowel sounds are normal.      Palpations: Abdomen is soft.      Tenderness: There is no abdominal tenderness.   Musculoskeletal:      Cervical back: Normal range of motion and neck supple. No edema or rigidity.   Lymphadenopathy:      Upper Body:      Right upper body: No axillary adenopathy.      Lower Body: No right inguinal adenopathy. No left inguinal adenopathy.   Skin:     General: Skin is warm and moist.      Capillary Refill: Capillary refill takes less than 2 seconds.   Neurological:      Mental Status: She is alert and oriented to person, place, and time.      Cranial Nerves: Cranial nerves 2-12 are intact.      Sensory: Sensation is intact.      Motor: Motor function is intact.   Psychiatric:         Attention and Perception: Attention normal.         Mood and Affect: Mood and affect normal.         Speech: Speech normal.         Behavior: Behavior is cooperative.              Lab Results:  I have reviewed these laboratory results:     Procedure Visit on 01/22/2025   Component Date Value Ref Range Status    Case Report 01/22/2025    Final                    Value:Dermatopathology                                  Case: [id number]                                   Authorizing Provider:  Tevin Anne MD      Collected:           01/22/2025 1141              Ordering Location:     [facility]       Received:            01/23/2025 0816              Pathologist:           Bouchra Ayala MD                                                              Specimen:    SKIN, Left Central Frontal Scalp                                                           FINAL DIAGNOSIS 01/22/2025    Final                    Value:SKIN, LEFT CENTRAL FRONTAL SCALP, EXCISION:  MALIGNANT MELANOMA, BRESLOW THICKNESS AT LEAST 3 MM, PRESENT ON THE DEEP MARGIN, SEE NOTE.    Note: Microscopic examination reveals a specimen that extends into the subcutaneous fat. In the superficial and deep dermis there are nests and fascicles of atypical epithelioid and spindled cells that focally appear to involve the deep margin. There appears to be processing artifact from previous freezing which makes the interpretation of the mitotic rate challenging.    ** Electronically signed out by Bouchra Ayala MD **          01/22/2025    Final                    Value:By the signature on this report, the individual or group listed as making the Final Interpretation/Diagnosis certifies that they have reviewed this case.       Clinical History 01/22/2025    Final                    Value:Encounter Diagnosis: Malignant melanoma of scalp (Multi)     Differential Diagnosis: melanoma  Objective: Is a 0.8 x 0.8 cm scar      Addendum 01/22/2025    Final                    Value:The frozen sections of the Mohs debulk (MC81-25) was reviewed and reveals a Breslow depth of at least 5 mm with broad transection across the deep margin. These sections reveal a different plane of section compared to the internal case signed out above and with formalin fixed paraffin embedded tissue there may be shrinkage compared with a frozen section.       Gross Description 01/22/2025    Final                    Value:A:  Received in formalin are three tan, ellipsoid pieces of skin.  The first piece is inked blue and measures 9 x 5 x 3 mm (A1).  The second piece is inked orange and measures 9 x 6 x 3 mm (A2).  The third piece is inked green and measures 10 x 6 x 3 mm.  They were embedded in toto.  The specimens  were inked.     The specimen was grossed by Kamilla Cobb.        Case Summary Report 01/22/2025    Final                    Value:MELANOMA OF THE SKIN: Excision, Re-Excision                            MELANOMA OF THE SKIN: EXCISION, RE-EXCISION  - All Specimens                            8th Edition - Protocol posted: 11/10/2021                                                        SPECIMEN                               Procedure:    Re-excision                                Specimen Laterality:    Left                                                         TUMOR                               Tumor Site:    Skin of scalp and neck: Left central frontal scalp                                Histologic Type:    Primary dermal                                Maximum Tumor (Breslow) Thickness (Millimeters):    At least: 3 mm                                 :    Transected on the deep margin.                                Macroscopic Satellite Nodule(s):    Not identified                                Ulceration:    Not identified                                Anatomic (Flex) Level:    At least level: IV                                  :    Transected on the deep margin.                                Mitotic Rate:    1 mitoses per mm2                               Microsatellite(s):    Not identified                                Lymphovascular Invasion:    Not identified                                Neurotropism:    Not identified                                Tumor-Infiltrating Lymphocytes:    Present, nonbrisk                                Tumor Regression:    Not identified                                MARGINS:                                     Margin Status for Invasive Melanoma:    Invasive melanoma present at margin                                    Margin(s) Involved by Invasive Melanoma:    Deep                                  Margin Status for Melanoma in situ:    Melanoma in situ is not  present in this specimen.                                REGIONAL LYMPH NODES:                                     Regional Lymph Node Status:                                       :    All regional lymph nodes negative for tumor                                    Total Number of Lymph Nodes Examined:    1                                    Number of Clements Nodes Examined:    1                                PATHOLOGIC STAGE CLASSIFICATION (pTNM, AJCC 8th Edition):                                     pT Category:    pT3a                                  pN Category:    pN0            Radiology Result:  I have reviewed the latest Imaging in PACS and the findings are noted in this note. I discussed the results of the latest imaging with the patient. All previous imaging were reviewed at the time it was completed. Full records are available in the EMR for review as well.     MR brain w and wo IV contrast    Result Date: 2/13/2025  Impression: No evidence of acute infarct, intracranial mass effect or midline shift.   No intracranial metastatic disease.   MACRO: None   Signed by: Timo Valadez 2/13/2025 6:00 PM Dictation workstation:   [id number]        Pathology Results:  I have reviewed the full pathology report recorded in the EMR. The pertinent portions indicating diagnosis are listed here in the note. for details please refer to the full report recorded in the EMR.    Dermatopathology- DERM LAB: [id number]  Order: [id number]   Collected 1/22/2025 11:41       Status: Edited Result - FINAL       Visible to patient: Yes (seen)       Dx: Malignant melanoma of scalp (Multi)    0 Result Notes      Component    FINAL DIAGNOSIS   SKIN, LEFT CENTRAL FRONTAL SCALP, EXCISION:  MALIGNANT MELANOMA, BRESLOW THICKNESS AT LEAST 3 MM, PRESENT ON THE DEEP MARGIN, SEE NOTE.     Note: Microscopic examination reveals a specimen that extends into the subcutaneous fat. In the superficial and deep dermis there are nests and fascicles of  atypical epithelioid and spindled cells that focally appear to involve the deep margin. There appears to be processing artifact from previous freezing which makes the interpretation of the mitotic rate challenging.     ** Electronically signed out by oBuchra Ayala MD **      Electronically signed by Bouchra Ayala MD on 1/27/2025 at [facility]        Micrographic Surgery Details:  Post-operative length (cm): 1.8  Post-operative width (cm): 1.7  Number of Mohs stages: 1  Indication for sending permanent sections: evaluate a debulking specimen     Stage 1     Comments: The patient was brought into the operating room and placed in the procedure chair in the appropriate position.  The area positive by previous biopsy was identified and confirmed with the patient. The area of clinically obvious tumor was debulked using a curette and/or scalpel as needed. An incision was made following the Mohs approach through the skin. The specimen was taken to the lab, divided into 3 piece(s) and appropriately chromacoded and processed.        Debulk:  5mm (will send for permanent)              Tumor features identified on Mohs section: no tumor identified      Depth of invasion: subcutaneous fat     Depth of defect: subcutaneous fat     Patient tolerance of procedure: tolerated well, no immediate complications     Assessment and Plan:   Assessment/Plan      Ms. Tasia Garcia is a 39 y.o. female with a diagnosis of stage IIb melanoma of the scalp. Please see the evolution of the case listed above in the cancer history.     Today,   We discussed the natural history of melanoma, risk factors, prognostic factors, staging, treatment according to various stages, and the kind of treatment involved. We discussed the role of surgery, radiation, and systemic treatment.     I discussed with the patient the evolution of immunotherapy over the last many decades. We discussed that immune-checkpoint inhibitor (ICI) therapy is a kind of immunotherapy. We  discussed, in simple terms, how ICI therapy works and how it is different from chemotherapy. We also discussed the common AEs that can possibly happen with ICI therapy. We also discussed the lack of biomarkers at this point, that can potentially help us to exquisitely personalize therapy for patients in terms of predicting responses and AEs. At the end of the talk, we gave her literature to read about this.     I discussed with the patient the role of adjuvant therapy in patients with stage III melanoma. Currently, immunotherapy (in the form of immune checkpoint inhibitors (ICI)) and targeted therapy (for those with BRAF positive melanoma) are approved in the management of stage III melanoma. Nivolumab and Pembrolizumab (anti-PD1) and Ipilimumab (anti-CTLA-4) are approved as adjuvant treatment for stage III melanoma. I do not recommend Ipilimumab at this point due to safety concerns which were reported in the clinical trials, and due to the fact that we have better drugs. Adjuvant therapy with Interferons has been abandoned by the melanoma community in general due to extremely high toxicity including suicidal ideation, so we do not even talk about these in our conversations. I made her aware that at this point, there is no evidence of comparison of efficacy between pembrolizumab and nivolumab, and she can choose either one. I recommend pembrolizumab due to scheduling ease and less number of trips to the infusion center compared to the scheduling requirments of nivolumab.     We do not know the BRAF status, however, if she were to be positive for the BRAF V600E/K mutation, then she would be eligible for targeted therapy in the form of BRAF and MEK inhibitor. Currently only Tafinlar and Mekinist are approved for adjuvant treatment of patients with stage III melanoma. There is no formal evidence of comparison between ICI and targeted therapy, hence it is not possible to tell whether one is superior to the other. I  discussed with her the adverse effects of BRAF and MEK inhibitors. Should she choose to go with BRAF and MEK inhibitors, I would want the patient o have clearance from cardiology oncology to monitor her heart function.    I also discussed with her the clinical trial REGE 2623 available at [facility].  In this trial we are randomizing patients between pembrolizumab (which is a standard of care) versus the combination of fianlimab and Cemiplimab.  The patient was given reading material regarding this trial.  We will also obtain MRI of the brain with and without contrast to complete the staging for new diagnosis of stage IIb melanoma prior to starting immunotherapy.    # Stage 2B melanoma of scalp  - Considering standard of care therapy with anti-PD-1 monotherapy versus enrollment in clinical trial REGE 2623  -MRI brain with and without contrast stat    Update: 02/13/25  MRI brain with and without contrast was completed-no evidence of intracranial metastasis    DISCLAIMER:   In preparing for this visit and writing this note, I reviewed all the previous electronic medical records (labs, imaging and medical charts) of the patient available in the physician portal. Significant findings which helped in decision making are recorded  in this chart.     The plan was discussed with the patient. We gave him ample opportunities to ask questions. All questions were answered to his satisfaction and he verbalized understanding.      INSTRUCTIONS FOR PATIENT  Ms. Tasia Garcia ,  It was a pleasure talking to you today.    Please go through the reading material we are providing you today.  We will be following up next week to discuss your MRI brain results and continue discussion regarding clinical trial versus standard of care.    Please make sure to schedule your appointments as we discussed. Your appointments should also appear in your MyChart.   In case of an emergency please dial 911 or report to your nearest  Emergency Room.  For all other questions, please do not hesitate to reach out to us at the number listed below.    Thank you for choosing [facility] at [facility].   We appreciate your visit.     Wes Veloz MD    Division of Hematology and Oncology  [facility] [employer] [facility]    Co-Director Sarcoma and Cutaneous Oncology  [facility]    Phone: [phone]  Fax: [phone]

## 2025-02-20 ENCOUNTER — TELEMEDICINE (OUTPATIENT)
Dept: HEMATOLOGY/ONCOLOGY | Facility: CLINIC | Age: 40
End: 2025-02-20
Payer: COMMERCIAL

## 2025-02-20 DIAGNOSIS — C43.4 MALIGNANT MELANOMA OF SCALP (MULTI): ICD-10-CM

## 2025-02-21 ENCOUNTER — TELEPHONE (OUTPATIENT)
Dept: HEMATOLOGY/ONCOLOGY | Facility: CLINIC | Age: 40
End: 2025-02-21
Payer: COMMERCIAL

## 2025-02-21 LAB
BRAF EXON 15 RESULTS: NORMAL
ELECTRONICALLY SIGNED BY: NORMAL

## 2025-02-21 NOTE — TELEPHONE ENCOUNTER
I spoke to the patient and again discussed participation in the clinical trial. She has some questions about the trial which she wants to think more about. She also wants to have more reading material which I emailed her today. I will talk to her again on Tuesday.     Wes Veloz MD    Division of Hematology and Oncology  [facility] [employer]    Co-Director Sarcoma and Cutaneous Oncology  [facility]    Phone: [phone]  Fax: [phone]

## 2025-02-25 ENCOUNTER — TELEMEDICINE (OUTPATIENT)
Dept: HEMATOLOGY/ONCOLOGY | Facility: HOSPITAL | Age: 40
End: 2025-02-25
Payer: COMMERCIAL

## 2025-02-25 DIAGNOSIS — C43.4 MALIGNANT MELANOMA OF SCALP (MULTI): Primary | ICD-10-CM

## 2025-02-25 PROCEDURE — 99214 OFFICE O/P EST MOD 30 MIN: CPT | Performed by: INTERNAL MEDICINE

## 2025-02-25 PROCEDURE — 1036F TOBACCO NON-USER: CPT | Performed by: INTERNAL MEDICINE

## 2025-02-25 ASSESSMENT — ENCOUNTER SYMPTOMS
DEPRESSION: 0
ABDOMINAL PAIN: 0
MYALGIAS: 0
CONSTIPATION: 0
CHEST TIGHTNESS: 0
DYSURIA: 0
BACK PAIN: 0
FREQUENCY: 0
CHILLS: 0
FEVER: 0
HOT FLASHES: 0
COUGH: 0
APPETITE CHANGE: 0
EYE PROBLEMS: 0
TROUBLE SWALLOWING: 0
DIARRHEA: 0
DIFFICULTY URINATING: 0
CONFUSION: 0
EXTREMITY WEAKNESS: 0
HEMATURIA: 0
FATIGUE: 0
VOMITING: 0
DIZZINESS: 0
NAUSEA: 0
SEIZURES: 0
NERVOUS/ANXIOUS: 0
SHORTNESS OF BREATH: 0
PALPITATIONS: 0
HEMOPTYSIS: 0
SORE THROAT: 0
LEG SWELLING: 0
FLANK PAIN: 0
ADENOPATHY: 0
SCLERAL ICTERUS: 0

## 2025-02-25 NOTE — PROGRESS NOTES
.Patient ID: Tasia Garcia is a 39 y.o. female.  Referring Physician: No referring provider defined for this encounter.  Primary Care Provider: Arya Ku MD     Chief Complaint   Patient presents with    Follow-up     Discuss clinical trial    Skin Cancer     Stage IIB melanoma      Oncology History Overview Note   Ms. Tasia Garcia is diagnosed with stage IIb melanoma of the scalp.  She first presented to ENT oncology in November 2020 for and underwent wide local excision and sentinel lymph node biopsy on 12/5/2024 which showed a 3 mm deep cutaneous melanoma positive on the margins.  The sentinel lymph node was traced to the parotid lymph node which was negative for an deposit.  Since the margin was positive, the patient was referred for Mohs surgery which he completed on 1/22/2025 with Dr. Tevin Anne and a negative margin was achieved.  The final Breslow depth was labeled at 5 mm, no ulceration, no LVI-pT4a melanoma.  PET/CT scan on 1/6/2025 was negative for any distant metastatic deposits.  MRI brain on 2/13/2025 was negative for intracranial deposits. We spoke to the patient and she agreed to participate in REGE 2623     Malignant melanoma of scalp (Multi)   11/15/2024 Initial Diagnosis    Referring Surgeon: Enzo Russell and Tevin Anne   Dermatologist: Rosio Velazquez   Date of first meeting with our team: 02/12/2025    Date of First meeting with surgical team: 11/12/2024  Melanoma type: Cutaneous  Location of primary site: Scalp-primary dermal  Date of WLE and SLNB: Initial resection on 12/5/2024 (Dr. Madelin Russell) followed by Mohs surgery (Dr. Tevin Anne) to clear margins on 1/22/25.  Final pathology: Breslow Depth-5 mm; Ulceration status-negative; LVI-negative; Other high risk features-negative  [place] lymph node status: 1 node examined in the parotid gland-found to be negative for malignancy  Final Stage: pT4a N0 M0-stage IIb melanoma- AJCC 8th Ed.    BRAF status: Not  "known  Initial MRI brain with and without contrast: 2/13/2025-unremarkable  Initial Full body PET scan: 01/6/2025-unremarkable       1/1/2025 Cancer Staged    Staging form: Melanoma of the Skin, AJCC 8th Edition, Pathologic stage from 1/1/2025: Stage IIB (pT4a, pN0, cM0) - Signed by Wes Veloz MD on 2/13/2025        Ms. Tasia Garcia is diagnosed with stage IIB melanoma- ([id number])- AJCC 8th Ed. She initially underwent wide local excision on 12/5/24 with Dr. Madelin Russell, but the margin was positive. She was then sent to Dr. Tevin Anne for Mohs surgery which was done on 1/22/25 and the margin was cleared. The sentinel nodes were negative. She met with medical oncology and decided to participate in the REGE 2623 trial.       Subjective   Please refer to \"Notes/Cancer History\" above for complete History of present illness.     I had a video conference visit with Ms. Tasia Garcia today. A face to face encounter was created and I spoke directly with the patient. Verbal consent was obtained to conduct this visit.      Ms. Garcia is agreeable to participate in the adjuvant trial- REGE 2623.     Review of Systems:   Review of Systems   Constitutional:  Negative for appetite change, chills, fatigue and fever.   HENT:   Negative for hearing loss, lump/mass, mouth sores, sore throat and trouble swallowing.    Eyes:  Negative for eye problems and icterus.   Respiratory:  Negative for chest tightness, cough, hemoptysis and shortness of breath.    Cardiovascular:  Negative for chest pain, leg swelling and palpitations.   Gastrointestinal:  Negative for abdominal pain, constipation, diarrhea, nausea and vomiting.   Endocrine: Negative for hot flashes.   Genitourinary:  Negative for difficulty urinating, dysuria, frequency and hematuria.    Musculoskeletal:  Negative for back pain, flank pain, gait problem and myalgias.   Skin:  Negative for itching and rash.   Neurological:  Negative for dizziness, extremity weakness, " gait problem and seizures.   Hematological:  Negative for adenopathy.   Psychiatric/Behavioral:  Negative for confusion and depression. The patient is not nervous/anxious.          MEDICAL HISTORY  Past Medical History:   Diagnosis Date    Dental disease 07/2024    Melanoma of scalp (Multi)     Personal history of other diseases of the female genital tract     History of infertility       FAMILY HISTORY  Family History   Problem Relation Name Age of Onset    Other (cervical carcinoma) Mother Kimberly fallon     Cancer Mother Kimberly fallon     Stroke Mother Kimberly fallon     Ovarian cancer Mother Kimberly fallon 45    Cerebral palsy Sister      Breast cancer Paternal Grandmother Laura Fallon         AGE UNKNOWN       TOBACCO HISTORY  Tobacco Use: Low Risk  (2/25/2025)    Patient History     Smoking Tobacco Use: Never     Smokeless Tobacco Use: Never     Passive Exposure: Never       SOCIAL HISTORY  Social Connections: Moderately Integrated (2/12/2025)    Social Connection and Isolation Panel [NHANES]     Frequency of Communication with Friends and Family: More than three times a week     Frequency of Social Gatherings with Friends and Family: More than three times a week     Attends Restoration Services: More than 4 times per year     Active Member of Clubs or Organizations: Yes     Attends Club or Organization Meetings: More than 4 times per year     Marital Status:    Recent Concern: Social Connections - At Risk (1/25/2025)    Received from [employer]    Social Connections     Connectedness: 2        Outpatient Medication Profile:  Current Outpatient Medications on File Prior to Visit   Medication Sig Dispense Refill    prenatal vit,calc76-iron-folic (Prenatabs Rx) 29 mg iron- 1 mg tablet Take 1 tablet by mouth once daily.       No current facility-administered medications on file prior to visit.         Performance Status:  Asymptomatic     Vitals and Measurements:   There were no vitals taken for this visit.       Physical Exam:   Physical Exam         Lab Results:  I have reviewed these laboratory results:     No visits with results within 1 Month(s) from this visit.   Latest known visit with results is:   Procedure Visit on 01/22/2025   Component Date Value Ref Range Status    Case Report 01/22/2025    Final                    Value:Dermatopathology                                  Case: [id number]                                   Authorizing Provider:  Tevin Anne MD      Collected:           01/22/2025 1141              Ordering Location:     [facility]       Received:            01/23/2025 0816              Pathologist:           Bouchra Ayala MD                                                             Specimen:    SKIN, Left Central Frontal Scalp                                                           FINAL DIAGNOSIS 01/22/2025    Final                    Value:SKIN, LEFT CENTRAL FRONTAL SCALP, EXCISION:  MALIGNANT MELANOMA, BRESLOW THICKNESS AT LEAST 3 MM, PRESENT ON THE DEEP MARGIN, SEE NOTE.    Note: Microscopic examination reveals a specimen that extends into the subcutaneous fat. In the superficial and deep dermis there are nests and fascicles of atypical epithelioid and spindled cells that focally appear to involve the deep margin. There appears to be processing artifact from previous freezing which makes the interpretation of the mitotic rate challenging.    ** Electronically signed out by Bouchra Ayala MD **          01/22/2025    Final                    Value:By the signature on this report, the individual or group listed as making the Final Interpretation/Diagnosis certifies that they have reviewed this case.       Clinical History 01/22/2025    Final                    Value:Encounter Diagnosis: Malignant melanoma of scalp (Multi)     Differential Diagnosis: melanoma  Objective: Is a 0.8 x 0.8 cm scar      Addendum 01/22/2025    Final                    Value:The frozen sections of the  Mohs debulk (MC81-25) was reviewed and reveals a Breslow depth of at least 5 mm with broad transection across the deep margin. These sections reveal a different plane of section compared to the internal case signed out above and with formalin fixed paraffin embedded tissue there may be shrinkage compared with a frozen section.       Gross Description 01/22/2025    Final                    Value:A:  Received in formalin are three tan, ellipsoid pieces of skin.  The first piece is inked blue and measures 9 x 5 x 3 mm (A1).  The second piece is inked orange and measures 9 x 6 x 3 mm (A2).  The third piece is inked green and measures 10 x 6 x 3 mm.  They were embedded in toto.  The specimens were inked.     The specimen was grossed by Kamilla Cobb.        Case Summary Report 01/22/2025    Final                    Value:MELANOMA OF THE SKIN: Excision, Re-Excision                            MELANOMA OF THE SKIN: EXCISION, RE-EXCISION  - All Specimens                            8th Edition - Protocol posted: 11/10/2021                                                        SPECIMEN                               Procedure:    Re-excision                                Specimen Laterality:    Left                                                         TUMOR                               Tumor Site:    Skin of scalp and neck: Left central frontal scalp                                Histologic Type:    Primary dermal                                Maximum Tumor (Breslow) Thickness (Millimeters):    At least: 3 mm                                 :    Transected on the deep margin.                                Macroscopic Satellite Nodule(s):    Not identified                                Ulceration:    Not identified                                Anatomic (Flex) Level:    At least level: IV                                  :    Transected on the deep margin.                                Mitotic Rate:    1 mitoses  per mm2                               Microsatellite(s):    Not identified                                Lymphovascular Invasion:    Not identified                                Neurotropism:    Not identified                                Tumor-Infiltrating Lymphocytes:    Present, nonbrisk                                Tumor Regression:    Not identified                                MARGINS:                                     Margin Status for Invasive Melanoma:    Invasive melanoma present at margin                                    Margin(s) Involved by Invasive Melanoma:    Deep                                  Margin Status for Melanoma in situ:    Melanoma in situ is not present in this specimen.                                REGIONAL LYMPH NODES:                                     Regional Lymph Node Status:                                       :    All regional lymph nodes negative for tumor                                    Total Number of Lymph Nodes Examined:    1                                    Number of [place] Nodes Examined:    1                                PATHOLOGIC STAGE CLASSIFICATION (pTNM, AJCC 8th Edition):                                     pT Category:    pT3a                                  pN Category:    pN0       BRAF Exon 15 Results 01/22/2025    Final                    Value:  RESULTS: Not Detected.      INTERPRETATION:  No variants are detected in the listed gene regions.  This finding does not exclude the presence of genetic alterations present in gene regions not tested or occurring at a frequency below the limit of detection.    DISEASE RELEVANT ALTERATIONS NOT DETECTED:   Negative for BRAF V600.      Archival material from this surgical specimen has been reviewed by the pathologist in order to determine the most appropriate tumor tissue for further molecular testing. The identification and selection of this tumor tissue is critical to the success of  subsequent molecular testing and analysis. After review, the pathologist has determined that this specimen requires microdissection to extract the targeted tumor area for optimal molecular testing and analysis. This targeted area has been recorded by the pathologist.    DISCLAIMER:   DNA was extracted from the specimen provided and tested for the presence of BRAF mutations in exon 15 ([id number]) by next                           generation sequencing. Genome assembly (hg19) was used for alignment and variant calling. A negative result (not detected) does not rule out the presence of a mutation below the limit of detection of this assay due to low neoplastic cell content, tumor heterogeneity, or the presence of additional mutations in the listed genes which are outside of the target regions in this assay. General population polymorphisms, promoter, synonymous and intronic variants (with the exception of splice variants) are not generally included in this report. This assay does not distinguish between somatic and germ-line alterations in analyzed regions.    Identification or absence of cancer-associated mutations does not necessarily indicate a response to therapy. Decisions on patient care and treatment must be based on the independent medical judgment of the treating physician, taking into account all applicable information concerning the patient's condition such as clinical and histopathologic findings,                           other laboratory findings, and patient preferences. This report includes information from public sources, including scientific and medical literature to better characterize the significance of alterations detected.    This laboratory developed test was developed and its analytical performance characteristics have been determined by  [facility]. This test has not been cleared or approved by the FDA; however, the FDA has determined that such approval is not necessary. The  [facility] is certified under the Clinical Laboratory Improvement Amendments of 1988 (CLIA-88) as qualified to perform high complexity testing.          Electronically signed and reported* 01/22/2025    Final                    Value:Nakia Burgess MD         Radiology Result:  I have reviewed the latest Imaging in PACS and the findings are noted in this note. I discussed the results of the latest imaging with the patient. All previous imaging were reviewed at the time it was completed. Full records are available in the EMR for review as well.     No new imaging to review.       Pathology Results:  I have reviewed the full pathology report recorded in the EMR. The pertinent portions indicating diagnosis are listed here in the note. for details please refer to the full report recorded in the EMR.    Dermatopathology- DERM LAB: [id number]  Order: [id number]   Collected 1/22/2025 11:41       Status: Edited Result - FINAL       Visible to patient: Yes (seen)       Dx: Malignant melanoma of scalp (Multi)    0 Result Notes      Component    FINAL DIAGNOSIS   SKIN, LEFT CENTRAL FRONTAL SCALP, EXCISION:  MALIGNANT MELANOMA, BRESLOW THICKNESS AT LEAST 3 MM, PRESENT ON THE DEEP MARGIN, SEE NOTE.     Note: Microscopic examination reveals a specimen that extends into the subcutaneous fat. In the superficial and deep dermis there are nests and fascicles of atypical epithelioid and spindled cells that focally appear to involve the deep margin. There appears to be processing artifact from previous freezing which makes the interpretation of the mitotic rate challenging.     ** Electronically signed out by Bouchra Ayala MD **      Electronically signed by Bouchra Ayala MD on 1/27/2025 at 1111        Micrographic Surgery Details:  Post-operative length (cm): 1.8  Post-operative width (cm): 1.7  Number of Mohs stages: 1  Indication for sending permanent sections: evaluate a debulking specimen     Stage 1     Comments: The patient was brought  into the operating room and placed in the procedure chair in the appropriate position.  The area positive by previous biopsy was identified and confirmed with the patient. The area of clinically obvious tumor was debulked using a curette and/or scalpel as needed. An incision was made following the Mohs approach through the skin. The specimen was taken to the lab, divided into 3 piece(s) and appropriately chromacoded and processed.        Debulk:  5mm (will send for permanent)              Tumor features identified on Mohs section: no tumor identified      Depth of invasion: subcutaneous fat     Depth of defect: subcutaneous fat     Patient tolerance of procedure: tolerated well, no immediate complications     Assessment and Plan:   Assessment/Plan      Ms. Tasia Garcia is a 39 y.o. female with a diagnosis of stage IIb melanoma of the scalp. Please see the evolution of the case listed above in the cancer history.     Today,   Ms. Garcia is ready to participate in the REGE 2623 trial after talking to her primary care physician. She will be contacted by the research team shortly.     # Stage 2B melanoma of scalp  - Enroll in REGE 2623 trial.     DISCLAIMER:   In preparing for this visit and writing this note, I reviewed all the previous electronic medical records (labs, imaging and medical charts) of the patient available in the physician portal. Significant findings which helped in decision making are recorded  in this chart.     The plan was discussed with the patient. We gave him ample opportunities to ask questions. All questions were answered to his satisfaction and he verbalized understanding.      Wes Veloz MD    Division of Hematology and Oncology  [facility]    Co-Director Sarcoma and Cutaneous Oncology  [facility]    Phone: [phone]  Fax: [phone]

## 2025-03-06 ENCOUNTER — DOCUMENTATION (OUTPATIENT)
Dept: HEMATOLOGY/ONCOLOGY | Facility: HOSPITAL | Age: 40
End: 2025-03-06
Payer: COMMERCIAL

## 2025-03-06 NOTE — PROGRESS NOTES
Research Note       Tasia Garcia was called today to discuss [id number].  I spoke with patient by phone on 3/5/25 and emailed consent for her review at that time.  Today I spoke with patient at length to discuss clinical trial.  All questions answered to her satisfaction, patient states she also d/w with her primary care physician and Dr. Veloz at length. She is interested in participating in clinical trial.  Appointment made for signing consent for 3/20/25 and patient will be out of town 3/21-3/31.  Once consent signed, we will schedule screening visit and plan treatment.  Patient states understanding.        Education Documentation  No documentation found.  Education Comments  No comments found.

## 2025-03-10 ASSESSMENT — DERMATOLOGY QUALITY OF LIFE (QOL) ASSESSMENT
RATE HOW BOTHERED YOU ARE BY SYMPTOMS OF YOUR SKIN PROBLEM (EG, ITCHING, STINGING BURNING, HURTING OR SKIN IRRITATION): 0 - NEVER BOTHERED
RATE HOW BOTHERED YOU ARE BY SYMPTOMS OF YOUR SKIN PROBLEM (EG, ITCHING, STINGING BURNING, HURTING OR SKIN IRRITATION): 0 - NEVER BOTHERED

## 2025-03-11 ENCOUNTER — APPOINTMENT (OUTPATIENT)
Dept: DERMATOLOGY | Facility: CLINIC | Age: 40
End: 2025-03-11
Payer: COMMERCIAL

## 2025-03-11 DIAGNOSIS — D22.9 MULTIPLE BENIGN NEVI: Primary | ICD-10-CM

## 2025-03-11 DIAGNOSIS — Z86.018 HISTORY OF DYSPLASTIC NEVUS: ICD-10-CM

## 2025-03-11 DIAGNOSIS — L81.4 LENTIGO: ICD-10-CM

## 2025-03-11 DIAGNOSIS — Z12.83 SCREENING EXAM FOR SKIN CANCER: ICD-10-CM

## 2025-03-11 DIAGNOSIS — Z85.820 PERSONAL HISTORY OF MALIGNANT MELANOMA OF SKIN: ICD-10-CM

## 2025-03-11 PROCEDURE — 99213 OFFICE O/P EST LOW 20 MIN: CPT | Performed by: DERMATOLOGY

## 2025-03-11 PROCEDURE — 1036F TOBACCO NON-USER: CPT | Performed by: DERMATOLOGY

## 2025-03-11 ASSESSMENT — DERMATOLOGY PATIENT ASSESSMENT
DO YOU HAVE IRREGULAR MENSTRUAL CYCLES: NO
HAVE YOU HAD OR DO YOU HAVE VASCULAR DISEASE: NO
ARE YOU AN ORGAN TRANSPLANT RECIPIENT: NO
DO YOU USE SUNSCREEN: OCCASIONALLY
ARE YOU ON BIRTH CONTROL: NO
DO YOU HAVE ANY NEW OR CHANGING LESIONS: NO
DO YOU USE A TANNING BED: NO
ARE YOU TRYING TO GET PREGNANT: NO
HAVE YOU HAD OR DO YOU HAVE A STAPH INFECTION: NO

## 2025-03-11 ASSESSMENT — DERMATOLOGY QUALITY OF LIFE (QOL) ASSESSMENT
ARE THERE EXCLUSIONS OR EXCEPTIONS FOR THE QUALITY OF LIFE ASSESSMENT: NO
RATE HOW EMOTIONALLY BOTHERED YOU ARE BY YOUR SKIN PROBLEM (FOR EXAMPLE, WORRY, EMBARRASSMENT, FRUSTRATION): 0 - NEVER BOTHERED
RATE HOW BOTHERED YOU ARE BY SYMPTOMS OF YOUR SKIN PROBLEM (EG, ITCHING, STINGING BURNING, HURTING OR SKIN IRRITATION): 0 - NEVER BOTHERED
RATE HOW BOTHERED YOU ARE BY EFFECTS OF YOUR SKIN PROBLEMS ON YOUR ACTIVITIES (EG, GOING OUT, ACCOMPLISHING WHAT YOU WANT, WORK ACTIVITIES OR YOUR RELATIONSHIPS WITH OTHERS): 0 - NEVER BOTHERED
DATE THE QUALITY-OF-LIFE ASSESSMENT WAS COMPLETED: 67275

## 2025-03-11 ASSESSMENT — PATIENT GLOBAL ASSESSMENT (PGA): PATIENT GLOBAL ASSESSMENT: PATIENT GLOBAL ASSESSMENT:  1 - CLEAR

## 2025-03-11 ASSESSMENT — ITCH NUMERIC RATING SCALE: HOW SEVERE IS YOUR ITCHING?: 0

## 2025-03-11 NOTE — PROGRESS NOTES
Subjective     Tasia Garcia is a 39 y.o. female who presents for the following: Skin Check. Last derm visit 1/22/25 with Dr. Anne for Mohs of melanoma on scalp. History of advance stage melanoma currently considering clinical trial with medical oncology.    stage IIB melanoma- ([id number])- AJCC 8th Ed. Left central frontal scalp breslow at least 0.4 mm, transected, diagnosed 10/22/24. Lack of epidermal attachment raises the possibility ofmetastatic melanoma. She initially underwent wide local excision on 12/5/24 with Dr. Madelin Russell, SLNB 0/1, but the margin was positive. She was then sent to Dr. Tevin Anne for Mohs surgery which was done on 1/22/25, debulk demonstrated a breslow of at least 5 mm and the margin was cleared. PET-CT 1/6/25 negative. She met with medical oncology and decided to participate in the REGE 2623 trial.     She has had her prior care at [facility]. We do not have those records today. She had a severely atypical nevus removed from her left forearm that was re-excised. Another one on her back and she has the re-excision scheduled for the end of the month.    Review of Systems:  No other skin or systemic complaints other than what is documented elsewhere in the note.    The following portions of the chart were reviewed this encounter and updated as appropriate:  Tobacco  Allergies  Meds  Problems  Med Hx  Surg Hx         Skin Cancer History  No skin cancer on file.      Specialty Problems          Dermatology Problems    Hair thinning    Malignant melanoma of scalp (Multi)        Objective   Well appearing patient in no apparent distress; mood and affect are within normal limits.    A full examination was performed including scalp, head, eyes, ears, nose, lips, neck, chest, axillae, abdomen, back, buttocks, bilateral upper extremities, bilateral lower extremities, hands, feet, fingers, toes, fingernails, and toenails. All findings within normal limits unless otherwise noted  below.    Assessment/Plan   1. Multiple benign nevi  Brown and tan macules and papules with reassuring findings on dermoscopy    Photos taken for monitoring:  - 3/11/25 of back          -These lesions have benign, reassuring patterns on dermoscopy  -Recommend continued self observation, and to contact the office if any changes in nevi are noticed    2. Lentigo  Tan macules    -Benign appearing on exam  -Reassurance, recommend observation    3. Personal history of malignant melanoma of skin  Lymph node basins palpated in relevant regions without appreciable adenopathy; regions include the neck and/or supraclavicular and/or axillary and/or inguinal and/or popliteal skin.    stage IIB melanoma- ([id number])- AJCC 8th Ed. Left central frontal scalp breslow at least 0.4 mm, transected, diagnosed 10/22/24. Lack of epidermal attachment raises the possibility ofmetastatic melanoma. She initially underwent wide local excision on 12/5/24 with Dr. Madelin Russell, SLNB 0/1, but the margin was positive. She was then sent to Dr. Tevin Anne for Mohs surgery which was done on 1/22/25, debulk demonstrated a breslow of at least 5 mm and the margin was cleared. PET-CT 1/6/25 negative. She met with medical oncology and decided to participate in the REGE 2623 trial.     Personal History of Malignant Melanoma  -Well healed scar(s) with no evidence of recurrence  -Discussed the need for regular full skin examinations in the office, and monthly self examinations at home  -Discussed the need for annual ophthalmology exams with dilation  -Discussed concerning lesions and when to return sooner if any changes noted in skin lesions. Patient verbalizes understanding.    4. Screening exam for skin cancer    Full body skin exam  -No lesions concerning for malignancy on the remainder the skin exam today   - The ugly duckling sign was discussed. Monitor for any skin lesions that are different in color, shape, or size than others on body  -Sun  protection was discussed. Recommend SPF 30+, hats with brims, sun protective clothing, and avoiding sun exposure between 10 AM and 2 PM whenever possible  -Recommend regular skin exams or sooner if new or changing lesions       Related Procedures  Follow Up In Dermatology - Established Patient  Follow Up In Dermatology - Established Patient    5. History of dysplastic nevus  She has had her prior care at [facility]. We do not have those records today. She had a severely atypical nevus removed from her left forearm that was re-excised. Another one on her back and she has the re-excision scheduled for the end of the month.        Follow up 3 months for Full Skin Exam.     She will decide if she wants to alternate her skin checks every 3 months between her [facility] derm provider and myself or wants to follow with me exclusively, at least for the next 1-2 years while she initiates therapy. I would like her to be seen every 3 months and I would like her to be seen by me at least every 6 months while she is on trial

## 2025-03-20 ENCOUNTER — DOCUMENTATION (OUTPATIENT)
Dept: HEMATOLOGY/ONCOLOGY | Facility: HOSPITAL | Age: 40
End: 2025-03-20
Payer: COMMERCIAL

## 2025-03-20 NOTE — PROGRESS NOTES
Research Note   Tasia Garcia is here today to consent for [id number].   Consent reviewed and signed. Patient given copy.  Medical history and con-meds reviewed with patient.   Discussed birth control, patient is currently not using any hormonal birth control, partner is using condoms.  Discussed with Dr. Veloz, recommends patient starting oral birthcontrol with O-Pill and continuing use of condoms.  Pt is agreeable, pt instructed to start O-Pill, can be obtained OTC at pharmacy.  Pt states understanding.  Patient will be out of town 3/21-3/29/2025.  Plan screening visit week of 3/31/2025, plan C1D1 week of 4/7/25.        Education Documentation  No documentation found.  Education Comments  No comments found.

## 2025-03-25 DIAGNOSIS — C43.9 MELANOMA OF SKIN (MULTI): ICD-10-CM

## 2025-03-28 DIAGNOSIS — C43.4 MALIGNANT MELANOMA OF SCALP (MULTI): Primary | ICD-10-CM

## 2025-03-28 RX ORDER — HEPARIN SODIUM,PORCINE/PF 10 UNIT/ML
50 SYRINGE (ML) INTRAVENOUS AS NEEDED
OUTPATIENT
Start: 2025-04-08

## 2025-03-28 RX ORDER — PROCHLORPERAZINE EDISYLATE 5 MG/ML
10 INJECTION INTRAMUSCULAR; INTRAVENOUS EVERY 6 HOURS PRN
OUTPATIENT
Start: 2025-04-08

## 2025-03-28 RX ORDER — PROCHLORPERAZINE MALEATE 10 MG
10 TABLET ORAL EVERY 6 HOURS PRN
OUTPATIENT
Start: 2025-04-08

## 2025-03-28 RX ORDER — HEPARIN 100 UNIT/ML
500 SYRINGE INTRAVENOUS AS NEEDED
OUTPATIENT
Start: 2025-04-08

## 2025-03-31 DIAGNOSIS — C43.9 MELANOMA OF SKIN (MULTI): ICD-10-CM

## 2025-04-01 ENCOUNTER — HOSPITAL ENCOUNTER (OUTPATIENT)
Dept: RADIOLOGY | Facility: HOSPITAL | Age: 40
Discharge: HOME | End: 2025-04-01
Payer: COMMERCIAL

## 2025-04-01 ENCOUNTER — OFFICE VISIT (OUTPATIENT)
Dept: HEMATOLOGY/ONCOLOGY | Facility: HOSPITAL | Age: 40
End: 2025-04-01
Payer: COMMERCIAL

## 2025-04-01 ENCOUNTER — HOSPITAL ENCOUNTER (OUTPATIENT)
Dept: CARDIOLOGY | Facility: HOSPITAL | Age: 40
Discharge: HOME | End: 2025-04-01
Payer: COMMERCIAL

## 2025-04-01 ENCOUNTER — LAB (OUTPATIENT)
Dept: LAB | Facility: HOSPITAL | Age: 40
End: 2025-04-01
Payer: COMMERCIAL

## 2025-04-01 VITALS
OXYGEN SATURATION: 98 % | SYSTOLIC BLOOD PRESSURE: 124 MMHG | HEART RATE: 72 BPM | BODY MASS INDEX: 36.96 KG/M2 | DIASTOLIC BLOOD PRESSURE: 67 MMHG | RESPIRATION RATE: 16 BRPM | WEIGHT: 196 LBS | TEMPERATURE: 97 F

## 2025-04-01 DIAGNOSIS — C43.9 MELANOMA OF SKIN (MULTI): ICD-10-CM

## 2025-04-01 DIAGNOSIS — C43.4 MALIGNANT MELANOMA OF SCALP (MULTI): Primary | ICD-10-CM

## 2025-04-01 LAB
ALBUMIN SERPL BCP-MCNC: 4.5 G/DL (ref 3.4–5)
ALP SERPL-CCNC: 73 U/L (ref 33–110)
ALT SERPL W P-5'-P-CCNC: 13 U/L (ref 7–45)
ANION GAP SERPL CALC-SCNC: 13 MMOL/L (ref 10–20)
APPEARANCE UR: CLEAR
APTT PPP: 28 SECONDS (ref 26–36)
AST SERPL W P-5'-P-CCNC: 18 U/L (ref 9–39)
B-HCG SERPL-ACNC: <3 MIU/ML
BASOPHILS # BLD AUTO: 0.02 X10*3/UL (ref 0–0.1)
BASOPHILS NFR BLD AUTO: 0.3 %
BILIRUB SERPL-MCNC: 0.6 MG/DL (ref 0–1.2)
BILIRUB UR STRIP.AUTO-MCNC: NEGATIVE MG/DL
BUN SERPL-MCNC: 13 MG/DL (ref 6–23)
CALCIUM SERPL-MCNC: 9.5 MG/DL (ref 8.6–10.3)
CARDIAC TROPONIN I PNL SERPL HS: <3 NG/L (ref 0–34)
CHLORIDE SERPL-SCNC: 105 MMOL/L (ref 98–107)
CK SERPL-CCNC: 123 U/L (ref 0–215)
CO2 SERPL-SCNC: 25 MMOL/L (ref 21–32)
COLOR UR: YELLOW
CORTIS AM PEAK SERPL-MSCNC: 14.4 UG/DL (ref 5–20)
CREAT SERPL-MCNC: 0.72 MG/DL (ref 0.5–1.05)
EGFRCR SERPLBLD CKD-EPI 2021: >90 ML/MIN/1.73M*2
EOSINOPHIL # BLD AUTO: 0.06 X10*3/UL (ref 0–0.7)
EOSINOPHIL NFR BLD AUTO: 1 %
ERYTHROCYTE [DISTWIDTH] IN BLOOD BY AUTOMATED COUNT: 12.8 % (ref 11.5–14.5)
GLUCOSE SERPL-MCNC: 97 MG/DL (ref 74–99)
GLUCOSE UR STRIP.AUTO-MCNC: NORMAL MG/DL
HBV SURFACE AG SERPL QL IA: NONREACTIVE
HCT VFR BLD AUTO: 42 % (ref 36–46)
HCV AB SER QL: NONREACTIVE
HGB BLD-MCNC: 14 G/DL (ref 12–16)
HIV 1+2 AB+HIV1 P24 AG SERPL QL IA: NONREACTIVE
IMM GRANULOCYTES # BLD AUTO: 0.02 X10*3/UL (ref 0–0.7)
IMM GRANULOCYTES NFR BLD AUTO: 0.3 % (ref 0–0.9)
INR PPP: 0.9 (ref 0.9–1.1)
KETONES UR STRIP.AUTO-MCNC: ABNORMAL MG/DL
LDH SERPL L TO P-CCNC: 133 U/L (ref 84–246)
LEUKOCYTE ESTERASE UR QL STRIP.AUTO: NEGATIVE
LYMPHOCYTES # BLD AUTO: 2.04 X10*3/UL (ref 1.2–4.8)
LYMPHOCYTES NFR BLD AUTO: 32.6 %
MAGNESIUM SERPL-MCNC: 2.17 MG/DL (ref 1.6–2.4)
MCH RBC QN AUTO: 29.7 PG (ref 26–34)
MCHC RBC AUTO-ENTMCNC: 33.3 G/DL (ref 32–36)
MCV RBC AUTO: 89 FL (ref 80–100)
MONOCYTES # BLD AUTO: 0.39 X10*3/UL (ref 0.1–1)
MONOCYTES NFR BLD AUTO: 6.2 %
MUCOUS THREADS #/AREA URNS AUTO: ABNORMAL /LPF
NEUTROPHILS # BLD AUTO: 3.73 X10*3/UL (ref 1.2–7.7)
NEUTROPHILS NFR BLD AUTO: 59.6 %
NITRITE UR QL STRIP.AUTO: NEGATIVE
NRBC BLD-RTO: 0 /100 WBCS (ref 0–0)
PH UR STRIP.AUTO: 5.5 [PH]
PLATELET # BLD AUTO: 317 X10*3/UL (ref 150–450)
POTASSIUM SERPL-SCNC: 4 MMOL/L (ref 3.5–5.3)
PROT SERPL-MCNC: 7.7 G/DL (ref 6.4–8.2)
PROT UR STRIP.AUTO-MCNC: ABNORMAL MG/DL
PROTHROMBIN TIME: 10 SECONDS (ref 9.8–12.4)
RBC # BLD AUTO: 4.71 X10*6/UL (ref 4–5.2)
RBC # UR STRIP.AUTO: ABNORMAL MG/DL
RBC #/AREA URNS AUTO: ABNORMAL /HPF
SODIUM SERPL-SCNC: 139 MMOL/L (ref 136–145)
SP GR UR STRIP.AUTO: 1.03
SQUAMOUS #/AREA URNS AUTO: ABNORMAL /HPF
URATE SERPL-MCNC: 3.2 MG/DL (ref 2.3–6.7)
UROBILINOGEN UR STRIP.AUTO-MCNC: NORMAL MG/DL
WBC # BLD AUTO: 6.3 X10*3/UL (ref 4.4–11.3)
WBC #/AREA URNS AUTO: ABNORMAL /HPF

## 2025-04-01 PROCEDURE — 83615 LACTATE (LD) (LDH) ENZYME: CPT

## 2025-04-01 PROCEDURE — 2550000001 HC RX 255 CONTRASTS: Mod: SE | Performed by: INTERNAL MEDICINE

## 2025-04-01 PROCEDURE — 87389 HIV-1 AG W/HIV-1&-2 AB AG IA: CPT

## 2025-04-01 PROCEDURE — 74177 CT ABD & PELVIS W/CONTRAST: CPT

## 2025-04-01 PROCEDURE — 84075 ASSAY ALKALINE PHOSPHATASE: CPT

## 2025-04-01 PROCEDURE — 70491 CT SOFT TISSUE NECK W/DYE: CPT

## 2025-04-01 PROCEDURE — 93005 ELECTROCARDIOGRAM TRACING: CPT

## 2025-04-01 PROCEDURE — 84550 ASSAY OF BLOOD/URIC ACID: CPT

## 2025-04-01 PROCEDURE — 85610 PROTHROMBIN TIME: CPT

## 2025-04-01 PROCEDURE — A9575 INJ GADOTERATE MEGLUMI 0.1ML: HCPCS | Mod: SE | Performed by: INTERNAL MEDICINE

## 2025-04-01 PROCEDURE — 70553 MRI BRAIN STEM W/O & W/DYE: CPT

## 2025-04-01 PROCEDURE — 83735 ASSAY OF MAGNESIUM: CPT

## 2025-04-01 PROCEDURE — 82550 ASSAY OF CK (CPK): CPT

## 2025-04-01 PROCEDURE — 82533 TOTAL CORTISOL: CPT

## 2025-04-01 PROCEDURE — 81001 URINALYSIS AUTO W/SCOPE: CPT

## 2025-04-01 PROCEDURE — 99214 OFFICE O/P EST MOD 30 MIN: CPT | Mod: 25

## 2025-04-01 PROCEDURE — 85730 THROMBOPLASTIN TIME PARTIAL: CPT

## 2025-04-01 PROCEDURE — 84484 ASSAY OF TROPONIN QUANT: CPT

## 2025-04-01 PROCEDURE — 87340 HEPATITIS B SURFACE AG IA: CPT

## 2025-04-01 PROCEDURE — 86803 HEPATITIS C AB TEST: CPT

## 2025-04-01 PROCEDURE — 85025 COMPLETE CBC W/AUTO DIFF WBC: CPT

## 2025-04-01 PROCEDURE — 82247 BILIRUBIN TOTAL: CPT

## 2025-04-01 PROCEDURE — 84702 CHORIONIC GONADOTROPIN TEST: CPT

## 2025-04-01 PROCEDURE — 36415 COLL VENOUS BLD VENIPUNCTURE: CPT

## 2025-04-01 RX ORDER — GADOTERATE MEGLUMINE 376.9 MG/ML
17 INJECTION INTRAVENOUS
Status: COMPLETED | OUTPATIENT
Start: 2025-04-01 | End: 2025-04-01

## 2025-04-01 RX ADMIN — GADOTERATE MEGLUMINE 17 ML: 376.9 INJECTION INTRAVENOUS at 12:56

## 2025-04-01 RX ADMIN — IOHEXOL 120 ML: 350 INJECTION, SOLUTION INTRAVENOUS at 11:37

## 2025-04-01 ASSESSMENT — ENCOUNTER SYMPTOMS
SHORTNESS OF BREATH: 0
NAUSEA: 0
CHILLS: 0
TROUBLE SWALLOWING: 0
APPETITE CHANGE: 0
HOT FLASHES: 0
DIFFICULTY URINATING: 0
BACK PAIN: 0
DIARRHEA: 0
COUGH: 0
DEPRESSION: 0
PALPITATIONS: 0
CONFUSION: 0
VOMITING: 0
ABDOMINAL PAIN: 0
SCLERAL ICTERUS: 0
DIZZINESS: 0
SEIZURES: 0
FLANK PAIN: 0
FEVER: 0
LEG SWELLING: 0
ADENOPATHY: 0
EXTREMITY WEAKNESS: 0
SORE THROAT: 0
HEMATURIA: 0
DYSURIA: 0
CONSTIPATION: 0
MYALGIAS: 0
FREQUENCY: 0
HEMOPTYSIS: 0
CHEST TIGHTNESS: 0
NERVOUS/ANXIOUS: 0
FATIGUE: 0
EYE PROBLEMS: 0

## 2025-04-01 ASSESSMENT — PAIN SCALES - GENERAL: PAINLEVEL_OUTOF10: 0-NO PAIN

## 2025-04-01 NOTE — PROGRESS NOTES
.Patient ID: Tasia Garcia is a 39 y.o. female.  Referring Physician: Wes Veloz MD  [address] [place] [address]  Primary Care Provider: Arya Ku MD     No chief complaint on file.     Oncology History Overview Note   Ms. Tasia Garcia is diagnosed with stage IIb melanoma of the scalp.  She first presented to ENT oncology in November 2020 for and underwent wide local excision and sentinel lymph node biopsy on 12/5/2024 which showed a 3 mm deep cutaneous melanoma positive on the margins.  The sentinel lymph node was traced to the parotid lymph node which was negative for an deposit.  Since the margin was positive, the patient was referred for Mohs surgery which he completed on 1/22/2025 with Dr. Tevin Anne and a negative margin was achieved.  The final Breslow depth was labeled at 5 mm, no ulceration, no LVI-pT4a melanoma.  PET/CT scan on 1/6/2025 was negative for any distant metastatic deposits.  MRI brain on 2/13/2025 was negative for intracranial deposits. We spoke to the patient and she agreed to participate in REGE 2623     Malignant melanoma of scalp (Multi)   11/15/2024 Initial Diagnosis    Referring Surgeon: Enzo Russell and Tevin Anne   Dermatologist: Rosio Velazquez   Date of first meeting with our team: 02/12/2025    Date of First meeting with surgical team: 11/12/2024  Melanoma type: Cutaneous  Location of primary site: Scalp-primary dermal  Date of WLE and SLNB: Initial resection on 12/5/2024 (Dr. Madelin Russell) followed by Mohs surgery (Dr. Tevin Anne) to clear margins on 1/22/25.  Final pathology: Breslow Depth-5 mm; Ulceration status-negative; LVI-negative; Other high risk features-negative  [place] lymph node status: 1 node examined in the parotid gland-found to be negative for malignancy  Final Stage: pT4a N0 M0-stage IIb melanoma- AJCC 8th Ed.    BRAF status: Not known  Initial MRI brain with and without contrast: 2/13/2025-unremarkable  Initial Full body  "PET scan: 01/6/2025-unremarkable       1/1/2025 Cancer Staged    Staging form: Melanoma of the Skin, AJCC 8th Edition, Pathologic stage from 1/1/2025: Stage IIB (pT4a, pN0, cM0) - Signed by Wes Vleoz MD on 2/13/2025 4/8/2025 -  Research Study Participant    ([facility]) [id number] Arms A, B, & C - Fianlimab / Cemiplimab or Pembrolizumab, 21 Day Cycles  Plan Provider: BEN Prado-CNP  Treatment goal: Curative  Line of treatment: Adjuvant  Associated studies: Fianlimab and Cemiplimab Versus Pembrolizumab in Resected High-risk Melanoma        Ms. Tasia Garcia is diagnosed with stage IIB melanoma- ([id number])- AJCC 8th Ed. She initially underwent wide local excision on 12/5/24 with Dr. Madelin Russell, but the margin was positive. She was then sent to Dr. Tevin Anne for Mohs surgery which was done on 1/22/25 and the margin was cleared. The sentinel nodes were negative. She met with medical oncology and decided to participate in the REGE 2623 trial.     Signed consent on REGE2623. Double blind study with Fianlimab (Anti-LAG-3) and Cemiplimab Versus Pembrolizumab in the Adjuvant Setting.    04.08.2025: Planned C1D1    Subjective   Please refer to \"Notes/Cancer History\" above for complete History of present illness.     Today 4/1/2025  Patient in clinic for screening visit on [id number].  Reports over all she feel good and continues to work. She owns an restaurant. Patient denies any pain, dizziness, lightheadedness, headaches, rash/itching, chills or fever, SOB, cough, sputum, chest pain or chest tightness, painful inflammation/ulceration oral mucous membranes, nausea/vomiting, diarrhea/constipation, hematemesis /hemoptysis, hematuria/rectal bleeding, urinary symptoms, swelling extremities, weakness/fatigue, or peripheral neuropathy. ROS as above. Remainder of 10 point review of systems elicited and otherwise unremarkable.     Review of Systems:   Review of Systems   Constitutional:  Negative for appetite " change, chills, fatigue and fever.   HENT:   Negative for hearing loss, lump/mass, mouth sores, sore throat and trouble swallowing.    Eyes:  Negative for eye problems and icterus.   Respiratory:  Negative for chest tightness, cough, hemoptysis and shortness of breath.    Cardiovascular:  Negative for chest pain, leg swelling and palpitations.   Gastrointestinal:  Negative for abdominal pain, constipation, diarrhea, nausea and vomiting.   Endocrine: Negative for hot flashes.   Genitourinary:  Negative for difficulty urinating, dysuria, frequency and hematuria.    Musculoskeletal:  Negative for back pain, flank pain, gait problem and myalgias.   Skin:  Negative for itching and rash.   Neurological:  Negative for dizziness, extremity weakness, gait problem and seizures.   Hematological:  Negative for adenopathy.   Psychiatric/Behavioral:  Negative for confusion and depression. The patient is not nervous/anxious.          MEDICAL HISTORY  Past Medical History:   Diagnosis Date    Dental disease 07/2024    Melanoma of scalp (Multi)     Personal history of other diseases of the female genital tract     History of infertility       FAMILY HISTORY  Family History   Problem Relation Name Age of Onset    Other (cervical carcinoma) Mother Kimberly fallon     Cancer Mother Kimberly fallon     Stroke Mother Kimberly fallon     Ovarian cancer Mother Kimberly fallon 45    Cerebral palsy Sister      Breast cancer Paternal Grandmother Laura Fallon         AGE UNKNOWN       TOBACCO HISTORY  Tobacco Use: Low Risk  (3/11/2025)    Patient History     Smoking Tobacco Use: Never     Smokeless Tobacco Use: Never     Passive Exposure: Never       SOCIAL HISTORY  Social Connections: Moderately Integrated (2/12/2025)    Social Connection and Isolation Panel [NHANES]     Frequency of Communication with Friends and Family: More than three times a week     Frequency of Social Gatherings with Friends and Family: More than three times a week     Attends  "[employer] Services: More than 4 times per year     Active Member of Clubs or Organizations: Yes     Attends Club or Organization Meetings: More than 4 times per year     Marital Status:    Recent Concern: Social Connections - At Risk (1/25/2025)    Received from [employer]    Social ROIÂ²     Connectedness: 2        Outpatient Medication Profile:  Current Outpatient Medications on File Prior to Visit   Medication Sig Dispense Refill    prenatal vit,calc76-iron-folic (Prenatabs Rx) 29 mg iron- 1 mg tablet Take 1 tablet by mouth once daily.       No current facility-administered medications on file prior to visit.         Performance Status:  ECOG 0 : Fully active, able to carry on all pre-disease performance without restriction      Vitals and Measurements:   There were no vitals taken for this visit.      Daily Weight  04/01/25 : 88.9 kg (196 lb)  02/12/25 : 87.5 kg (193 lb)  01/27/25 : 84.2 kg (185 lb 10 oz)  12/05/24 : 84.2 kg (185 lb 10 oz)  11/12/24 : 83 kg (183 lb)           12/5/2024     5:00 PM 12/5/2024     5:15 PM 12/5/2024     5:20 PM 12/5/2024     5:30 PM 1/27/2025    10:01 AM 2/12/2025     3:04 PM 4/1/2025     9:51 AM   Vitals   Systolic 134 126  140  124 124   Diastolic 79 75  82  88 67   BP Location      Left arm Right arm   Heart Rate 59 60 68 60  79 72   Temp      35.6 °C (96.1 °F) 36.1 °C (97 °F)   Resp 10 13 14 13  16 16   Height     1.564 m (5' 1.58\") 1.551 m (5' 1.06\")    Weight (lb)     185.63 193 196   BMI     34.42 kg/m2 36.39 kg/m2 36.96 kg/m2   BSA (m2)     1.91 m2 1.94 m2 1.96 m2   Visit Report      Report Report         Physical Exam:   Physical Exam  Constitutional:       Appearance: Normal appearance.   HENT:      Head: Normocephalic.      Mouth/Throat:      Mouth: Mucous membranes are moist.      Tongue: No lesions.      Palate: No mass.      Pharynx: Oropharynx is clear.   Cardiovascular:      Rate and Rhythm: Normal rate and regular rhythm.      Heart sounds: Normal heart " sounds, S1 normal and S2 normal.   Pulmonary:      Effort: Pulmonary effort is normal.      Breath sounds: Normal breath sounds.   Abdominal:      General: Bowel sounds are normal.      Palpations: Abdomen is soft.   Musculoskeletal:      Cervical back: Full passive range of motion without pain.   Lymphadenopathy:      Cervical: No cervical adenopathy.   Skin:     General: Skin is cool.      Capillary Refill: Capillary refill takes less than 2 seconds.      Comments: Scan lesion well healed. New benign lesion removed from the back by dermatology stiches intact.   Neurological:      General: No focal deficit present.      Cranial Nerves: Cranial nerves 2-12 are intact.   Psychiatric:         Attention and Perception: Attention normal.         Mood and Affect: Mood normal.         Behavior: Behavior is cooperative.         Cognition and Memory: Cognition normal.         Judgment: Judgment normal.         Lab Results:  I have reviewed these laboratory results:     Lab on 04/01/2025   Component Date Value Ref Range Status    WBC 04/01/2025 6.3  4.4 - 11.3 x10*3/uL Final    nRBC 04/01/2025 0.0  0.0 - 0.0 /100 WBCs Final    RBC 04/01/2025 4.71  4.00 - 5.20 x10*6/uL Final    Hemoglobin 04/01/2025 14.0  12.0 - 16.0 g/dL Final    Hematocrit 04/01/2025 42.0  36.0 - 46.0 % Final    MCV 04/01/2025 89  80 - 100 fL Final    MCH 04/01/2025 29.7  26.0 - 34.0 pg Final    MCHC 04/01/2025 33.3  32.0 - 36.0 g/dL Final    RDW 04/01/2025 12.8  11.5 - 14.5 % Final    Platelets 04/01/2025 317  150 - 450 x10*3/uL Final    Neutrophils % 04/01/2025 59.6  40.0 - 80.0 % Final    Immature Granulocytes %, Automated 04/01/2025 0.3  0.0 - 0.9 % Final    Lymphocytes % 04/01/2025 32.6  13.0 - 44.0 % Final    Monocytes % 04/01/2025 6.2  2.0 - 10.0 % Final    Eosinophils % 04/01/2025 1.0  0.0 - 6.0 % Final    Basophils % 04/01/2025 0.3  0.0 - 2.0 % Final    Neutrophils Absolute 04/01/2025 3.73  1.20 - 7.70 x10*3/uL Final    Immature Granulocytes  Absolute, Au* 04/01/2025 0.02  0.00 - 0.70 x10*3/uL Final    Lymphocytes Absolute 04/01/2025 2.04  1.20 - 4.80 x10*3/uL Final    Monocytes Absolute 04/01/2025 0.39  0.10 - 1.00 x10*3/uL Final    Eosinophils Absolute 04/01/2025 0.06  0.00 - 0.70 x10*3/uL Final    Basophils Absolute 04/01/2025 0.02  0.00 - 0.10 x10*3/uL Final    Glucose 04/01/2025 97  74 - 99 mg/dL Final    Sodium 04/01/2025 139  136 - 145 mmol/L Final    Potassium 04/01/2025 4.0  3.5 - 5.3 mmol/L Final    Chloride 04/01/2025 105  98 - 107 mmol/L Final    Bicarbonate 04/01/2025 25  21 - 32 mmol/L Final    Anion Gap 04/01/2025 13  10 - 20 mmol/L Final    Urea Nitrogen 04/01/2025 13  6 - 23 mg/dL Final    Creatinine 04/01/2025 0.72  0.50 - 1.05 mg/dL Final    eGFR 04/01/2025 >90  >60 mL/min/1.73m*2 Final    Calcium 04/01/2025 9.5  8.6 - 10.3 mg/dL Final    Albumin 04/01/2025 4.5  3.4 - 5.0 g/dL Final    Alkaline Phosphatase 04/01/2025 73  33 - 110 U/L Final    Total Protein 04/01/2025 7.7  6.4 - 8.2 g/dL Final    AST 04/01/2025 18  9 - 39 U/L Final    Bilirubin, Total 04/01/2025 0.6  0.0 - 1.2 mg/dL Final    ALT 04/01/2025 13  7 - 45 U/L Final    Magnesium 04/01/2025 2.17  1.60 - 2.40 mg/dL Final    LDH 04/01/2025 133  84 - 246 U/L Final    Uric Acid 04/01/2025 3.2  2.3 - 6.7 mg/dL Final    aPTT 04/01/2025 28  26 - 36 seconds Final    Protime 04/01/2025 10.0  9.8 - 12.4 seconds Final    INR 04/01/2025 0.9  0.9 - 1.1 Final         Radiology Result:        Pathology Results:  I have reviewed the full pathology report recorded in the EMR. The pertinent portions indicating diagnosis are listed here in the note. for details please refer to the full report recorded in the EMR.    Dermatopathology- DERM LAB: [id number]  Order: [id number]   Collected 1/22/2025 11:41       Status: Edited Result - FINAL       Visible to patient: Yes (seen)       Dx: Malignant melanoma of scalp (Multi)    0 Result Notes      Component    FINAL DIAGNOSIS   SKIN, LEFT CENTRAL FRONTAL  SCALP, EXCISION:  MALIGNANT MELANOMA, BRESLOW THICKNESS AT LEAST 3 MM, PRESENT ON THE DEEP MARGIN, SEE NOTE.     Note: Microscopic examination reveals a specimen that extends into the subcutaneous fat. In the superficial and deep dermis there are nests and fascicles of atypical epithelioid and spindled cells that focally appear to involve the deep margin. There appears to be processing artifact from previous freezing which makes the interpretation of the mitotic rate challenging.     ** Electronically signed out by Bouchra Ayala MD **      Electronically signed by Bouchra Ayala MD on 1/27/2025 at [facility]        Micrographic Surgery Details:  Post-operative length (cm): 1.8  Post-operative width (cm): 1.7  Number of Mohs stages: 1  Indication for sending permanent sections: evaluate a debulking specimen     Stage 1     Comments: The patient was brought into the operating room and placed in the procedure chair in the appropriate position.  The area positive by previous biopsy was identified and confirmed with the patient. The area of clinically obvious tumor was debulked using a curette and/or scalpel as needed. An incision was made following the Mohs approach through the skin. The specimen was taken to the lab, divided into 3 piece(s) and appropriately chromacoded and processed.        Debulk:  5mm (will send for permanent)              Tumor features identified on Mohs section: no tumor identified      Depth of invasion: subcutaneous fat     Depth of defect: subcutaneous fat     Patient tolerance of procedure: tolerated well, no immediate complications     Assessment and Plan:   Assessment/Plan      Ms. Tasia Garcia is a 39 y.o. female with a diagnosis of stage IIb melanoma of the scalp. Please see the evolution of the case listed above in the cancer history.     Today 4.1.2025,   Ms. Garcia in clinic completing screening requirements for the study [id number]. In past visit Dr Veloz discussed about different treatment  options available for Melanoma. He also discussed the role of immunotherapy in the adjuvant setting. He shared info about SOC vs Clinical trial. Patient decided to participate on study and signed consent. She looks clinically good to participate on a clinical trial. Possible AE's associated with immunotherapy discussed with patient. Plan discussed in detail with the patient. Patient in agreement with the plan of care and is aware to call the office and research nurse if she has any questions. RTC per protocol.       # Stage 2B melanoma of scalp  - Complete screen and start on REGE 2623 trial ASAP.    DISCLAIMER:   In preparing for this visit and writing this note, I reviewed all the previous electronic medical records (labs, imaging and medical charts) of the patient available in the physician portal. Significant findings which helped in decision making are recorded  in this chart.     The plan was discussed with the patient. We gave him ample opportunities to ask questions. All questions were answered to his satisfaction and he verbalized understanding.      MD Collin Olivo APRN.

## 2025-04-02 ENCOUNTER — APPOINTMENT (OUTPATIENT)
Dept: HEMATOLOGY/ONCOLOGY | Facility: HOSPITAL | Age: 40
End: 2025-04-02
Payer: COMMERCIAL

## 2025-04-02 ENCOUNTER — DOCUMENTATION (OUTPATIENT)
Dept: HEMATOLOGY/ONCOLOGY | Facility: HOSPITAL | Age: 40
End: 2025-04-02
Payer: COMMERCIAL

## 2025-04-02 ENCOUNTER — APPOINTMENT (OUTPATIENT)
Dept: RADIOLOGY | Facility: HOSPITAL | Age: 40
End: 2025-04-02
Payer: COMMERCIAL

## 2025-04-02 NOTE — PROGRESS NOTES
Research Note Screening    Late note for 4/1/2025  Tasia Garcia is here today (4/1/2025) to screen for [id number].   Consent reviewed and signed on 3/20/2025. Patient given copy.  Procedures completed per protocol. AE's and con-meds reviewed with patient. Pt started taking O-pill for birth control on 3/20/2025 and has agreed to remain abstinent until she has 2 menstrual cycles after starting BCP.  When she becomes sexually active she will continue BCP and partner will use condoms.  Potential treatment start date is 4/8/2025.    Education Documentation  No documentation found.  Education Comments  No comments found.

## 2025-04-04 LAB
ATRIAL RATE: 64 BPM
P AXIS: 22 DEGREES
P OFFSET: 195 MS
P ONSET: 153 MS
PR INTERVAL: 140 MS
Q ONSET: 223 MS
QRS COUNT: 10 BEATS
QRS DURATION: 76 MS
QT INTERVAL: 422 MS
QTC CALCULATION(BAZETT): 435 MS
QTC FREDERICIA: 431 MS
R AXIS: 18 DEGREES
T AXIS: 43 DEGREES
T OFFSET: 434 MS
VENTRICULAR RATE: 64 BPM

## 2025-04-07 ENCOUNTER — APPOINTMENT (OUTPATIENT)
Dept: HEMATOLOGY/ONCOLOGY | Facility: HOSPITAL | Age: 40
End: 2025-04-07
Payer: COMMERCIAL

## 2025-04-08 ENCOUNTER — LAB (OUTPATIENT)
Dept: LAB | Facility: HOSPITAL | Age: 40
End: 2025-04-08
Payer: COMMERCIAL

## 2025-04-08 ENCOUNTER — OFFICE VISIT (OUTPATIENT)
Dept: HEMATOLOGY/ONCOLOGY | Facility: HOSPITAL | Age: 40
End: 2025-04-08
Payer: COMMERCIAL

## 2025-04-08 ENCOUNTER — DOCUMENTATION (OUTPATIENT)
Dept: HEMATOLOGY/ONCOLOGY | Facility: HOSPITAL | Age: 40
End: 2025-04-08

## 2025-04-08 ENCOUNTER — INFUSION (OUTPATIENT)
Dept: HEMATOLOGY/ONCOLOGY | Facility: HOSPITAL | Age: 40
End: 2025-04-08
Payer: COMMERCIAL

## 2025-04-08 VITALS
RESPIRATION RATE: 20 BRPM | HEART RATE: 77 BPM | BODY MASS INDEX: 37.66 KG/M2 | WEIGHT: 199.74 LBS | TEMPERATURE: 97.3 F | OXYGEN SATURATION: 100 % | DIASTOLIC BLOOD PRESSURE: 62 MMHG | SYSTOLIC BLOOD PRESSURE: 132 MMHG

## 2025-04-08 VITALS
HEART RATE: 74 BPM | BODY MASS INDEX: 37.52 KG/M2 | TEMPERATURE: 96.8 F | OXYGEN SATURATION: 99 % | DIASTOLIC BLOOD PRESSURE: 65 MMHG | SYSTOLIC BLOOD PRESSURE: 124 MMHG | WEIGHT: 199 LBS | RESPIRATION RATE: 18 BRPM

## 2025-04-08 DIAGNOSIS — C43.4 MALIGNANT MELANOMA OF SCALP (MULTI): ICD-10-CM

## 2025-04-08 DIAGNOSIS — Z00.6 PATIENT IN CLINICAL RESEARCH STUDY: ICD-10-CM

## 2025-04-08 DIAGNOSIS — Z51.12 ENCOUNTER FOR ANTINEOPLASTIC IMMUNOTHERAPY: ICD-10-CM

## 2025-04-08 DIAGNOSIS — C43.4 MALIGNANT MELANOMA OF SCALP (MULTI): Primary | ICD-10-CM

## 2025-04-08 LAB
ALBUMIN SERPL BCP-MCNC: 4.9 G/DL (ref 3.4–5)
ALP SERPL-CCNC: 84 U/L (ref 33–110)
ALT SERPL W P-5'-P-CCNC: 13 U/L (ref 7–45)
ANION GAP SERPL CALC-SCNC: 12 MMOL/L (ref 10–20)
APPEARANCE UR: CLEAR
APTT PPP: 28 SECONDS (ref 26–36)
AST SERPL W P-5'-P-CCNC: 20 U/L (ref 9–39)
B-HCG SERPL-ACNC: <3 MIU/ML
BASOPHILS # BLD AUTO: 0.03 X10*3/UL (ref 0–0.1)
BASOPHILS NFR BLD AUTO: 0.4 %
BILIRUB SERPL-MCNC: 0.7 MG/DL (ref 0–1.2)
BILIRUB UR STRIP.AUTO-MCNC: NEGATIVE MG/DL
BUN SERPL-MCNC: 10 MG/DL (ref 6–23)
CALCIUM SERPL-MCNC: 9.1 MG/DL (ref 8.6–10.3)
CHLORIDE SERPL-SCNC: 103 MMOL/L (ref 98–107)
CK SERPL-CCNC: 128 U/L (ref 0–215)
CO2 SERPL-SCNC: 28 MMOL/L (ref 21–32)
COLOR UR: YELLOW
CREAT SERPL-MCNC: 0.67 MG/DL (ref 0.5–1.05)
CRP SERPL-MCNC: 0.12 MG/DL
EGFRCR SERPLBLD CKD-EPI 2021: >90 ML/MIN/1.73M*2
EOSINOPHIL # BLD AUTO: 0.04 X10*3/UL (ref 0–0.7)
EOSINOPHIL NFR BLD AUTO: 0.6 %
ERYTHROCYTE [DISTWIDTH] IN BLOOD BY AUTOMATED COUNT: 12.6 % (ref 11.5–14.5)
FERRITIN SERPL-MCNC: 12 NG/ML (ref 8–150)
GLUCOSE SERPL-MCNC: 93 MG/DL (ref 74–99)
GLUCOSE UR STRIP.AUTO-MCNC: NORMAL MG/DL
HCT VFR BLD AUTO: 42.7 % (ref 36–46)
HGB BLD-MCNC: 14.3 G/DL (ref 12–16)
IMM GRANULOCYTES # BLD AUTO: 0.03 X10*3/UL (ref 0–0.7)
IMM GRANULOCYTES NFR BLD AUTO: 0.4 % (ref 0–0.9)
INR PPP: 1 (ref 0.9–1.1)
KETONES UR STRIP.AUTO-MCNC: NEGATIVE MG/DL
LDH SERPL L TO P-CCNC: 175 U/L (ref 84–246)
LEUKOCYTE ESTERASE UR QL STRIP.AUTO: NEGATIVE
LYMPHOCYTES # BLD AUTO: 2.4 X10*3/UL (ref 1.2–4.8)
LYMPHOCYTES NFR BLD AUTO: 34.9 %
MAGNESIUM SERPL-MCNC: 2.25 MG/DL (ref 1.6–2.4)
MCH RBC QN AUTO: 29.7 PG (ref 26–34)
MCHC RBC AUTO-ENTMCNC: 33.5 G/DL (ref 32–36)
MCV RBC AUTO: 89 FL (ref 80–100)
MONOCYTES # BLD AUTO: 0.27 X10*3/UL (ref 0.1–1)
MONOCYTES NFR BLD AUTO: 3.9 %
MUCOUS THREADS #/AREA URNS AUTO: NORMAL /LPF
NEUTROPHILS # BLD AUTO: 4.11 X10*3/UL (ref 1.2–7.7)
NEUTROPHILS NFR BLD AUTO: 59.8 %
NITRITE UR QL STRIP.AUTO: NEGATIVE
NRBC BLD-RTO: 0 /100 WBCS (ref 0–0)
PH UR STRIP.AUTO: 6.5 [PH]
PLATELET # BLD AUTO: 377 X10*3/UL (ref 150–450)
POTASSIUM SERPL-SCNC: 3.7 MMOL/L (ref 3.5–5.3)
PROT SERPL-MCNC: 8.1 G/DL (ref 6.4–8.2)
PROT UR STRIP.AUTO-MCNC: ABNORMAL MG/DL
PROTHROMBIN TIME: 10.5 SECONDS (ref 9.8–12.4)
RBC # BLD AUTO: 4.81 X10*6/UL (ref 4–5.2)
RBC # UR STRIP.AUTO: NEGATIVE MG/DL
RBC #/AREA URNS AUTO: NORMAL /HPF
SODIUM SERPL-SCNC: 139 MMOL/L (ref 136–145)
SP GR UR STRIP.AUTO: 1.03
SQUAMOUS #/AREA URNS AUTO: NORMAL /HPF
URATE SERPL-MCNC: 3.4 MG/DL (ref 2.3–6.7)
UROBILINOGEN UR STRIP.AUTO-MCNC: ABNORMAL MG/DL
WBC # BLD AUTO: 6.9 X10*3/UL (ref 4.4–11.3)
WBC #/AREA URNS AUTO: NORMAL /HPF

## 2025-04-08 PROCEDURE — 36415 COLL VENOUS BLD VENIPUNCTURE: CPT

## 2025-04-08 PROCEDURE — 83735 ASSAY OF MAGNESIUM: CPT

## 2025-04-08 PROCEDURE — 85025 COMPLETE CBC W/AUTO DIFF WBC: CPT

## 2025-04-08 PROCEDURE — 83615 LACTATE (LD) (LDH) ENZYME: CPT

## 2025-04-08 PROCEDURE — 84550 ASSAY OF BLOOD/URIC ACID: CPT

## 2025-04-08 PROCEDURE — C9399 UNCLASSIFIED DRUGS OR BIOLOG: HCPCS | Mod: SE

## 2025-04-08 PROCEDURE — 80053 COMPREHEN METABOLIC PANEL: CPT

## 2025-04-08 PROCEDURE — 2560000001 HC RX 256 EXPERIMENTAL DRUGS: Mod: SE

## 2025-04-08 PROCEDURE — 82550 ASSAY OF CK (CPK): CPT

## 2025-04-08 PROCEDURE — 81001 URINALYSIS AUTO W/SCOPE: CPT

## 2025-04-08 PROCEDURE — 84702 CHORIONIC GONADOTROPIN TEST: CPT

## 2025-04-08 PROCEDURE — 99214 OFFICE O/P EST MOD 30 MIN: CPT | Mod: 25

## 2025-04-08 PROCEDURE — 86140 C-REACTIVE PROTEIN: CPT

## 2025-04-08 PROCEDURE — 82728 ASSAY OF FERRITIN: CPT

## 2025-04-08 PROCEDURE — 85730 THROMBOPLASTIN TIME PARTIAL: CPT

## 2025-04-08 PROCEDURE — 85610 PROTHROMBIN TIME: CPT

## 2025-04-08 PROCEDURE — 96365 THER/PROPH/DIAG IV INF INIT: CPT | Mod: INF

## 2025-04-08 RX ORDER — EPINEPHRINE 0.3 MG/.3ML
0.3 INJECTION SUBCUTANEOUS EVERY 5 MIN PRN
Status: CANCELLED | OUTPATIENT
Start: 2025-04-08

## 2025-04-08 RX ORDER — HEPARIN 100 UNIT/ML
500 SYRINGE INTRAVENOUS AS NEEDED
Status: DISCONTINUED | OUTPATIENT
Start: 2025-04-08 | End: 2025-04-08 | Stop reason: HOSPADM

## 2025-04-08 RX ORDER — FAMOTIDINE 10 MG/ML
20 INJECTION, SOLUTION INTRAVENOUS ONCE AS NEEDED
Status: CANCELLED | OUTPATIENT
Start: 2025-04-08

## 2025-04-08 RX ORDER — HEPARIN SODIUM,PORCINE/PF 10 UNIT/ML
50 SYRINGE (ML) INTRAVENOUS AS NEEDED
Status: DISCONTINUED | OUTPATIENT
Start: 2025-04-08 | End: 2025-04-08 | Stop reason: HOSPADM

## 2025-04-08 RX ORDER — DIPHENHYDRAMINE HYDROCHLORIDE 50 MG/ML
50 INJECTION, SOLUTION INTRAMUSCULAR; INTRAVENOUS AS NEEDED
Status: DISCONTINUED | OUTPATIENT
Start: 2025-04-08 | End: 2025-04-08 | Stop reason: HOSPADM

## 2025-04-08 RX ORDER — PROCHLORPERAZINE EDISYLATE 5 MG/ML
10 INJECTION INTRAMUSCULAR; INTRAVENOUS EVERY 6 HOURS PRN
Status: DISCONTINUED | OUTPATIENT
Start: 2025-04-08 | End: 2025-04-08 | Stop reason: HOSPADM

## 2025-04-08 RX ORDER — EPINEPHRINE 0.3 MG/.3ML
0.3 INJECTION SUBCUTANEOUS EVERY 5 MIN PRN
Status: DISCONTINUED | OUTPATIENT
Start: 2025-04-08 | End: 2025-04-08 | Stop reason: HOSPADM

## 2025-04-08 RX ORDER — HEPARIN 100 UNIT/ML
500 SYRINGE INTRAVENOUS AS NEEDED
OUTPATIENT
Start: 2025-04-08

## 2025-04-08 RX ORDER — ALBUTEROL SULFATE 0.83 MG/ML
3 SOLUTION RESPIRATORY (INHALATION) AS NEEDED
Status: CANCELLED | OUTPATIENT
Start: 2025-04-08

## 2025-04-08 RX ORDER — DIPHENHYDRAMINE HYDROCHLORIDE 50 MG/ML
50 INJECTION, SOLUTION INTRAMUSCULAR; INTRAVENOUS AS NEEDED
Status: CANCELLED | OUTPATIENT
Start: 2025-04-08

## 2025-04-08 RX ORDER — FAMOTIDINE 10 MG/ML
20 INJECTION, SOLUTION INTRAVENOUS ONCE AS NEEDED
Status: DISCONTINUED | OUTPATIENT
Start: 2025-04-08 | End: 2025-04-08 | Stop reason: HOSPADM

## 2025-04-08 RX ORDER — PROCHLORPERAZINE MALEATE 10 MG
10 TABLET ORAL EVERY 6 HOURS PRN
Status: DISCONTINUED | OUTPATIENT
Start: 2025-04-08 | End: 2025-04-08 | Stop reason: HOSPADM

## 2025-04-08 RX ORDER — HEPARIN SODIUM,PORCINE/PF 10 UNIT/ML
50 SYRINGE (ML) INTRAVENOUS AS NEEDED
OUTPATIENT
Start: 2025-04-08

## 2025-04-08 RX ORDER — ALBUTEROL SULFATE 0.83 MG/ML
3 SOLUTION RESPIRATORY (INHALATION) AS NEEDED
Status: DISCONTINUED | OUTPATIENT
Start: 2025-04-08 | End: 2025-04-08 | Stop reason: HOSPADM

## 2025-04-08 RX ADMIN — Medication 1 DOSE: at 15:18

## 2025-04-08 ASSESSMENT — ENCOUNTER SYMPTOMS
FREQUENCY: 0
HOT FLASHES: 0
HEMOPTYSIS: 0
DYSURIA: 0
FLANK PAIN: 0
APPETITE CHANGE: 0
TROUBLE SWALLOWING: 0
HEMATURIA: 0
NERVOUS/ANXIOUS: 0
FATIGUE: 0
VOMITING: 0
PALPITATIONS: 0
SEIZURES: 0
CONFUSION: 0
NAUSEA: 0
BACK PAIN: 0
DIZZINESS: 0
CHEST TIGHTNESS: 0
LEG SWELLING: 0
ABDOMINAL PAIN: 0
DEPRESSION: 0
CONSTIPATION: 0
ADENOPATHY: 0
DIARRHEA: 0
EYE PROBLEMS: 0
MYALGIAS: 0
COUGH: 0
SHORTNESS OF BREATH: 0
CHILLS: 0
FEVER: 0
SORE THROAT: 0
EXTREMITY WEAKNESS: 0
DIFFICULTY URINATING: 0
SCLERAL ICTERUS: 0

## 2025-04-08 ASSESSMENT — PAIN SCALES - GENERAL
PAINLEVEL_OUTOF10: 0-NO PAIN
PAINLEVEL_OUTOF10: 0-NO PAIN

## 2025-04-08 NOTE — PROGRESS NOTES
.Patient ID: Tasia Garcia is a 39 y.o. female.  Referring Physician: Wes Veloz MD  [id number] [place] [address]  Primary Care Provider: Arya Ku MD     Chief Complaint   Patient presents with    Follow-up      Oncology History Overview Note   Ms. Tasia Garcia is diagnosed with stage IIb melanoma of the scalp.  She first presented to ENT oncology in November 2020 for and underwent wide local excision and sentinel lymph node biopsy on 12/5/2024 which showed a 3 mm deep cutaneous melanoma positive on the margins.  The sentinel lymph node was traced to the parotid lymph node which was negative for an deposit.  Since the margin was positive, the patient was referred for Mohs surgery which he completed on 1/22/2025 with Dr. Tevin Anne and a negative margin was achieved.  The final Breslow depth was labeled at 5 mm, no ulceration, no LVI-pT4a melanoma.  PET/CT scan on 1/6/2025 was negative for any distant metastatic deposits.  MRI brain on 2/13/2025 was negative for intracranial deposits. We spoke to the patient and she agreed to participate in REGE 2623     Malignant melanoma of scalp (Multi)   11/15/2024 Initial Diagnosis    Referring Surgeon: Enzo Russell and Tevin Anne   Dermatologist: Rosio Velazquez   Date of first meeting with our team: 02/12/2025    Date of First meeting with surgical team: 11/12/2024  Melanoma type: Cutaneous  Location of primary site: Scalp-primary dermal  Date of WLE and SLNB: Initial resection on 12/5/2024 (Dr. Madelin Russell) followed by Mohs surgery (Dr. Tevin Anne) to clear margins on 1/22/25.  Final pathology: Breslow Depth-5 mm; Ulceration status-negative; LVI-negative; Other high risk features-negative  Hubbell lymph node status: 1 node examined in the parotid gland-found to be negative for malignancy  Final Stage: pT4a N0 M0-stage IIb melanoma- AJCC 8th Ed.    BRAF status: Not known  Initial MRI brain with and without contrast:  "2/13/2025-unremarkable  Initial Full body PET scan: 01/6/2025-unremarkable       1/1/2025 Cancer Staged    Staging form: Melanoma of the Skin, AJCC 8th Edition, Pathologic stage from 1/1/2025: Stage IIB (pT4a, pN0, cM0) - Signed by Wes Veloz MD on 2/13/2025 4/8/2025 -  Research Study Participant    ([facility]) [id number] Arms A, B, & C - Fianlimab / Cemiplimab or Pembrolizumab, 21 Day Cycles  Plan Provider: BEN Prado-CNP  Treatment goal: Curative  Line of treatment: Adjuvant  Associated studies: Fianlimab and Cemiplimab Versus Pembrolizumab in Resected High-risk Melanoma        Ms. Tasia Garcia is diagnosed with stage IIB melanoma- ([id number])- AJCC 8th Ed. She initially underwent wide local excision on 12/5/24 with Dr. Madelin Russell, but the margin was positive. She was then sent to Dr. Tevin Anne for Mohs surgery which was done on 1/22/25 and the margin was cleared. The sentinel nodes were negative. She met with medical oncology and decided to participate in the REGE 2623 trial.     Signed consent on [id number]. Double blind study with Fianlimab (Anti-LAG-3) and Cemiplimab Versus Pembrolizumab in the Adjuvant Setting.    C1D1: 04.08.25    Subjective   Please refer to \"Notes/Cancer History\" above for complete History of present illness.     Today 4/8/2025  Patient in clinic for C1D1 on [id number].  Reports she is a bit anxious today given she is starting treatment today. She reports she continues to be active. Patient denies any pain, dizziness, lightheadedness, headaches, rash/itching, chills or fever, SOB, cough, sputum, chest pain or chest tightness, painful inflammation/ulceration oral mucous membranes, nausea/vomiting, diarrhea/constipation, hematemesis /hemoptysis, hematuria/rectal bleeding, urinary symptoms, swelling extremities, weakness/fatigue, or peripheral neuropathy. ROS as above. Remainder of 10 point review of systems elicited and otherwise unremarkable.     Review of Systems: "   Review of Systems   Constitutional:  Negative for appetite change, chills, fatigue and fever.   HENT:   Negative for hearing loss, lump/mass, mouth sores, sore throat and trouble swallowing.    Eyes:  Negative for eye problems and icterus.   Respiratory:  Negative for chest tightness, cough, hemoptysis and shortness of breath.    Cardiovascular:  Negative for chest pain, leg swelling and palpitations.   Gastrointestinal:  Negative for abdominal pain, constipation, diarrhea, nausea and vomiting.   Endocrine: Negative for hot flashes.   Genitourinary:  Negative for difficulty urinating, dysuria, frequency and hematuria.    Musculoskeletal:  Negative for back pain, flank pain, gait problem and myalgias.   Skin:  Negative for itching and rash.   Neurological:  Negative for dizziness, extremity weakness, gait problem and seizures.   Hematological:  Negative for adenopathy.   Psychiatric/Behavioral:  Negative for confusion and depression. The patient is not nervous/anxious.          MEDICAL HISTORY  Past Medical History:   Diagnosis Date    Dental disease 07/2024    Melanoma of scalp (Multi)     Personal history of other diseases of the female genital tract     History of infertility       FAMILY HISTORY  Family History   Problem Relation Name Age of Onset    Other (cervical carcinoma) Mother Kimberly fallon     Cancer Mother Kimberly fallon     Stroke Mother Kimberly fallon     Ovarian cancer Mother Kimberly fallon 45    Cerebral palsy Sister      Breast cancer Paternal Grandmother Laura Fallon         AGE UNKNOWN       TOBACCO HISTORY  Tobacco Use: Low Risk  (3/11/2025)    Patient History     Smoking Tobacco Use: Never     Smokeless Tobacco Use: Never     Passive Exposure: Never       SOCIAL HISTORY  Social Connections: Moderately Integrated (2/12/2025)    Social Connection and Isolation Panel [NHANES]     Frequency of Communication with Friends and Family: More than three times a week     Frequency of Social Gatherings with  "Friends and Family: More than three times a week     Attends Yazidi Services: More than 4 times per year     Active Member of Clubs or Organizations: Yes     Attends Club or Organization Meetings: More than 4 times per year     Marital Status:    Recent Concern: Social Connections - At Risk (1/25/2025)    Received from [facility]    Social qunb     Connectedness: 2        Outpatient Medication Profile:  Current Outpatient Medications on File Prior to Visit   Medication Sig Dispense Refill    prenatal vit,calc76-iron-folic (Prenatabs Rx) 29 mg iron- 1 mg tablet Take 1 tablet by mouth once daily.       No current facility-administered medications on file prior to visit.         Performance Status:  ECOG 0 : Fully active, able to carry on all pre-disease performance without restriction      Vitals and Measurements:   There were no vitals taken for this visit.     Daily Weight  04/08/25 : 90.6 kg (199 lb 11.8 oz)  04/01/25 : 88.9 kg (196 lb)  02/12/25 : 87.5 kg (193 lb)  01/27/25 : 84.2 kg (185 lb 10 oz)  12/05/24 : 84.2 kg (185 lb 10 oz)         12/5/2024     5:15 PM 12/5/2024     5:20 PM 12/5/2024     5:30 PM 1/27/2025    10:01 AM 2/12/2025     3:04 PM 4/1/2025     9:51 AM 4/8/2025    12:58 PM   Vitals   Systolic 126  140  124 124 132   Diastolic 75  82  88 67 62   BP Location     Left arm Right arm Left arm   Heart Rate 60 68 60  79 72 77   Temp     35.6 °C (96.1 °F) 36.1 °C (97 °F) 36.3 °C (97.3 °F)   Resp 13 14 13  16 16 20   Height    1.564 m (5' 1.58\") 1.551 m (5' 1.06\")     Weight (lb)    185.63 193 196 199.74   BMI    34.42 kg/m2 36.39 kg/m2 36.96 kg/m2 37.66 kg/m2   BSA (m2)    1.91 m2 1.94 m2 1.96 m2 1.98 m2   Visit Report     Report Report Report          Physical Exam:   Physical Exam  Constitutional:       Appearance: Normal appearance.   HENT:      Head: Normocephalic.      Mouth/Throat:      Mouth: Mucous membranes are moist.      Tongue: No lesions.      Palate: No mass.      Pharynx: " Oropharynx is clear.   Cardiovascular:      Rate and Rhythm: Normal rate and regular rhythm.      Heart sounds: Normal heart sounds, S1 normal and S2 normal.   Pulmonary:      Effort: Pulmonary effort is normal.      Breath sounds: Normal breath sounds.   Abdominal:      General: Bowel sounds are normal.      Palpations: Abdomen is soft.   Musculoskeletal:      Cervical back: Full passive range of motion without pain.   Lymphadenopathy:      Cervical: No cervical adenopathy.   Skin:     General: Skin is cool.      Capillary Refill: Capillary refill takes less than 2 seconds.      Comments: Scan lesion well healed. New benign lesion removed from the back by dermatology stiches intact.   Neurological:      General: No focal deficit present.      Cranial Nerves: Cranial nerves 2-12 are intact.   Psychiatric:         Attention and Perception: Attention normal.         Mood and Affect: Mood normal.         Behavior: Behavior is cooperative.         Cognition and Memory: Cognition normal.         Judgment: Judgment normal.       Lab Results:  I have reviewed these laboratory results:     Lab on 04/08/2025   Component Date Value Ref Range Status    WBC 04/08/2025 6.9  4.4 - 11.3 x10*3/uL Final    nRBC 04/08/2025 0.0  0.0 - 0.0 /100 WBCs Final    RBC 04/08/2025 4.81  4.00 - 5.20 x10*6/uL Final    Hemoglobin 04/08/2025 14.3  12.0 - 16.0 g/dL Final    Hematocrit 04/08/2025 42.7  36.0 - 46.0 % Final    MCV 04/08/2025 89  80 - 100 fL Final    MCH 04/08/2025 29.7  26.0 - 34.0 pg Final    MCHC 04/08/2025 33.5  32.0 - 36.0 g/dL Final    RDW 04/08/2025 12.6  11.5 - 14.5 % Final    Platelets 04/08/2025 377  150 - 450 x10*3/uL Final    Neutrophils % 04/08/2025 59.8  40.0 - 80.0 % Final    Immature Granulocytes %, Automated 04/08/2025 0.4  0.0 - 0.9 % Final    Lymphocytes % 04/08/2025 34.9  13.0 - 44.0 % Final    Monocytes % 04/08/2025 3.9  2.0 - 10.0 % Final    Eosinophils % 04/08/2025 0.6  0.0 - 6.0 % Final    Basophils % 04/08/2025  0.4  0.0 - 2.0 % Final    Neutrophils Absolute 04/08/2025 4.11  1.20 - 7.70 x10*3/uL Final    Immature Granulocytes Absolute, Au* 04/08/2025 0.03  0.00 - 0.70 x10*3/uL Final    Lymphocytes Absolute 04/08/2025 2.40  1.20 - 4.80 x10*3/uL Final    Monocytes Absolute 04/08/2025 0.27  0.10 - 1.00 x10*3/uL Final    Eosinophils Absolute 04/08/2025 0.04  0.00 - 0.70 x10*3/uL Final    Basophils Absolute 04/08/2025 0.03  0.00 - 0.10 x10*3/uL Final    Glucose 04/08/2025 93  74 - 99 mg/dL Final    Sodium 04/08/2025 139  136 - 145 mmol/L Final    Potassium 04/08/2025 3.7  3.5 - 5.3 mmol/L Final    Chloride 04/08/2025 103  98 - 107 mmol/L Final    Bicarbonate 04/08/2025 28  21 - 32 mmol/L Final    Anion Gap 04/08/2025 12  10 - 20 mmol/L Final    Urea Nitrogen 04/08/2025 10  6 - 23 mg/dL Final    Creatinine 04/08/2025 0.67  0.50 - 1.05 mg/dL Final    eGFR 04/08/2025 >90  >60 mL/min/1.73m*2 Final    Calcium 04/08/2025 9.1  8.6 - 10.3 mg/dL Final    Albumin 04/08/2025 4.9  3.4 - 5.0 g/dL Final    Alkaline Phosphatase 04/08/2025 84  33 - 110 U/L Final    Total Protein 04/08/2025 8.1  6.4 - 8.2 g/dL Final    AST 04/08/2025 20  9 - 39 U/L Final    Bilirubin, Total 04/08/2025 0.7  0.0 - 1.2 mg/dL Final    ALT 04/08/2025 13  7 - 45 U/L Final    HCG, Beta-Quantitative 04/08/2025 <3  <5 mIU/mL Final    aPTT 04/08/2025 28  26 - 36 seconds Final    Creatine Kinase 04/08/2025 128  0 - 215 U/L Final    C-Reactive Protein 04/08/2025 0.12  <1.00 mg/dL Final    Ferritin 04/08/2025 12  8 - 150 ng/mL Final    LDH 04/08/2025 175  84 - 246 U/L Final    Magnesium 04/08/2025 2.25  1.60 - 2.40 mg/dL Final    Protime 04/08/2025 10.5  9.8 - 12.4 seconds Final    INR 04/08/2025 1.0  0.9 - 1.1 Final    Uric Acid 04/08/2025 3.4  2.3 - 6.7 mg/dL Final   Hospital Outpatient Visit on 04/01/2025   Component Date Value Ref Range Status    Ventricular Rate 04/01/2025 64  BPM Final    Atrial Rate 04/01/2025 64  BPM Final    KS Interval 04/01/2025 140  ms Final     QRS Duration 04/01/2025 76  ms Final    QT Interval 04/01/2025 422  ms Final    QTC Calculation(Bazett) 04/01/2025 435  ms Final    P Axis 04/01/2025 22  degrees Final    R Axis 04/01/2025 18  degrees Final    T Axis 04/01/2025 43  degrees Final    QRS Count 04/01/2025 10  beats Final    Q Onset 04/01/2025 223  ms Final    P Onset 04/01/2025 153  ms Final    P Offset 04/01/2025 195  ms Final    T Offset 04/01/2025 434  ms Final    QTC Fredericia 04/01/2025 431  ms Final   Lab on 04/01/2025   Component Date Value Ref Range Status    Cortisol  A.M. 04/01/2025 14.4  5.0 - 20.0 ug/dL Final    WBC 04/01/2025 6.3  4.4 - 11.3 x10*3/uL Final    nRBC 04/01/2025 0.0  0.0 - 0.0 /100 WBCs Final    RBC 04/01/2025 4.71  4.00 - 5.20 x10*6/uL Final    Hemoglobin 04/01/2025 14.0  12.0 - 16.0 g/dL Final    Hematocrit 04/01/2025 42.0  36.0 - 46.0 % Final    MCV 04/01/2025 89  80 - 100 fL Final    MCH 04/01/2025 29.7  26.0 - 34.0 pg Final    MCHC 04/01/2025 33.3  32.0 - 36.0 g/dL Final    RDW 04/01/2025 12.8  11.5 - 14.5 % Final    Platelets 04/01/2025 317  150 - 450 x10*3/uL Final    Neutrophils % 04/01/2025 59.6  40.0 - 80.0 % Final    Immature Granulocytes %, Automated 04/01/2025 0.3  0.0 - 0.9 % Final    Lymphocytes % 04/01/2025 32.6  13.0 - 44.0 % Final    Monocytes % 04/01/2025 6.2  2.0 - 10.0 % Final    Eosinophils % 04/01/2025 1.0  0.0 - 6.0 % Final    Basophils % 04/01/2025 0.3  0.0 - 2.0 % Final    Neutrophils Absolute 04/01/2025 3.73  1.20 - 7.70 x10*3/uL Final    Immature Granulocytes Absolute, Au* 04/01/2025 0.02  0.00 - 0.70 x10*3/uL Final    Lymphocytes Absolute 04/01/2025 2.04  1.20 - 4.80 x10*3/uL Final    Monocytes Absolute 04/01/2025 0.39  0.10 - 1.00 x10*3/uL Final    Eosinophils Absolute 04/01/2025 0.06  0.00 - 0.70 x10*3/uL Final    Basophils Absolute 04/01/2025 0.02  0.00 - 0.10 x10*3/uL Final    Glucose 04/01/2025 97  74 - 99 mg/dL Final    Sodium 04/01/2025 139  136 - 145 mmol/L Final    Potassium 04/01/2025  4.0  3.5 - 5.3 mmol/L Final    Chloride 04/01/2025 105  98 - 107 mmol/L Final    Bicarbonate 04/01/2025 25  21 - 32 mmol/L Final    Anion Gap 04/01/2025 13  10 - 20 mmol/L Final    Urea Nitrogen 04/01/2025 13  6 - 23 mg/dL Final    Creatinine 04/01/2025 0.72  0.50 - 1.05 mg/dL Final    eGFR 04/01/2025 >90  >60 mL/min/1.73m*2 Final    Calcium 04/01/2025 9.5  8.6 - 10.3 mg/dL Final    Albumin 04/01/2025 4.5  3.4 - 5.0 g/dL Final    Alkaline Phosphatase 04/01/2025 73  33 - 110 U/L Final    Total Protein 04/01/2025 7.7  6.4 - 8.2 g/dL Final    AST 04/01/2025 18  9 - 39 U/L Final    Bilirubin, Total 04/01/2025 0.6  0.0 - 1.2 mg/dL Final    ALT 04/01/2025 13  7 - 45 U/L Final    Magnesium 04/01/2025 2.17  1.60 - 2.40 mg/dL Final    LDH 04/01/2025 133  84 - 246 U/L Final    Uric Acid 04/01/2025 3.2  2.3 - 6.7 mg/dL Final    Creatine Kinase 04/01/2025 123  0 - 215 U/L Final    aPTT 04/01/2025 28  26 - 36 seconds Final    Protime 04/01/2025 10.0  9.8 - 12.4 seconds Final    INR 04/01/2025 0.9  0.9 - 1.1 Final    HCG, Beta-Quantitative 04/01/2025 <3  <5 mIU/mL Final    Troponin I, High Sensitivity (CMC) 04/01/2025 <3  0 - 34 ng/L Final    HIV 1/2 Antigen/Antibody Screen wi* 04/01/2025 Nonreactive  Nonreactive Final    Hepatitis B Surface AG 04/01/2025 Nonreactive  Nonreactive Final    Hepatitis C AB 04/01/2025 Nonreactive  Nonreactive Final    Color, Urine 04/01/2025 Yellow  Light-Yellow, Yellow, Dark-Yellow Final    Appearance, Urine 04/01/2025 Clear  Clear Final    Specific Gravity, Urine 04/01/2025 1.035  1.005 - 1.035 Final    pH, Urine 04/01/2025 5.5  5.0, 5.5, 6.0, 6.5, 7.0, 7.5, 8.0 Final    Protein, Urine 04/01/2025 20 (TRACE)  NEGATIVE, 10 (TRACE), 20 (TRACE) mg/dL Final    Glucose, Urine 04/01/2025 Normal  Normal mg/dL Final    Blood, Urine 04/01/2025 1.0 (3+) (A)  NEGATIVE mg/dL Final    Ketones, Urine 04/01/2025 TRACE (A)  NEGATIVE mg/dL Final    Bilirubin, Urine 04/01/2025 NEGATIVE  NEGATIVE mg/dL Final     Urobilinogen, Urine 04/01/2025 Normal  Normal mg/dL Final    Nitrite, Urine 04/01/2025 NEGATIVE  NEGATIVE Final    Leukocyte Esterase, Urine 04/01/2025 NEGATIVE  NEGATIVE Final    WBC, Urine 04/01/2025 1-5  1-5, NONE /HPF Final    RBC, Urine 04/01/2025 11-20 (A)  NONE, 1-2, 3-5 /HPF Final    Squamous Epithelial Cells, Urine 04/01/2025 1-9 (SPARSE)  Reference range not established. /HPF Final    Mucus, Urine 04/01/2025 1+  Reference range not established. /LPF Final         Radiology Result:     CT chest abdomen pelvis w IV contrast    Result Date: 4/2/2025  Impression: Melanoma restaging scan compared to PET-CT 01/06/2025: 1. No evidence of disease. 2. Additional incidental non-acute findings as detailed above.     I personally reviewed the images/study and I agree with the findings as stated by Dr. Stephon Cardona. This study was interpreted at [place], [place].   MACRO: None.   Signed by: Rohith Martinez 4/2/2025 9:29 AM Dictation workstation:   [id number]    CT soft tissue neck w IV contrast    Result Date: 4/1/2025  Impression: There is no CT evidence of spine soft tissue mass or yara station lymphadenopathy.   I personally reviewed the images/study and I agree with the findings as stated. This study was interpreted at [place], [place].   MACRO: None   Signed by: Salvador Huynh 4/1/2025 4:58 PM Dictation workstation:   [id number]    MR brain w and wo IV contrast    Result Date: 4/1/2025  Impression: 1. No definitive evidence of brain metastasis. 2. 6 mm lesion in the right frontal scalp is entirely nonspecific and correlation with physical exam findings is recommended. It is similar to slightly increased in size from 02/13/2025.       MACRO: None   Signed by: Nakia Chavez 4/1/2025 1:31 PM Dictation workstation:   [id number]       Pathology Results:  I have reviewed the full pathology report recorded in the EMR. The  pertinent portions indicating diagnosis are listed here in the note. for details please refer to the full report recorded in the EMR.    Dermatopathology- DERM LAB: [id number]  Order: [id number]   Collected 1/22/2025 11:41       Status: Edited Result - FINAL       Visible to patient: Yes (seen)       Dx: Malignant melanoma of scalp (Multi)    0 Result Notes      Component    FINAL DIAGNOSIS   SKIN, LEFT CENTRAL FRONTAL SCALP, EXCISION:  MALIGNANT MELANOMA, BRESLOW THICKNESS AT LEAST 3 MM, PRESENT ON THE DEEP MARGIN, SEE NOTE.     Note: Microscopic examination reveals a specimen that extends into the subcutaneous fat. In the superficial and deep dermis there are nests and fascicles of atypical epithelioid and spindled cells that focally appear to involve the deep margin. There appears to be processing artifact from previous freezing which makes the interpretation of the mitotic rate challenging.     ** Electronically signed out by Bouchra Ayala MD **      Electronically signed by Bouchra Ayala MD on 1/27/2025 at 1111        Micrographic Surgery Details:  Post-operative length (cm): 1.8  Post-operative width (cm): 1.7  Number of Mohs stages: 1  Indication for sending permanent sections: evaluate a debulking specimen     Stage 1     Comments: The patient was brought into the operating room and placed in the procedure chair in the appropriate position.  The area positive by previous biopsy was identified and confirmed with the patient. The area of clinically obvious tumor was debulked using a curette and/or scalpel as needed. An incision was made following the Mohs approach through the skin. The specimen was taken to the lab, divided into 3 piece(s) and appropriately chromacoded and processed.        Debulk:  5mm (will send for permanent)              Tumor features identified on Mohs section: no tumor identified      Depth of invasion: subcutaneous fat     Depth of defect: subcutaneous fat     Patient tolerance of  procedure: tolerated well, no immediate complications     Assessment and Plan:   Assessment/Plan      Ms. Tasia Garcia is a 39 y.o. female with a diagnosis of stage IIb melanoma of the scalp. Please see the evolution of the case listed above in the cancer history.     Today 4.8.2025,   In past visit Dr Veloz discussed about different treatment options available for Melanoma. He also discussed the role of immunotherapy in the adjuvant setting. He shared info about SOC vs Clinical trial. Patient decided to participate on study and signed consent. She completed screening requirements for the study and is in today for C1D1 treatment. She looks clinically good today we will proceed with treatment. Plan discussed in detail with the patient. Patient in agreement with the plan of care and is aware to call the office and research nurse if she has any questions. RTC per protocol.       # Stage 2B melanoma of scalp  - Proceed with C1D1 treatment on [id number].     DISCLAIMER:   In preparing for this visit and writing this note, I reviewed all the previous electronic medical records (labs, imaging and medical charts) of the patient available in the physician portal. Significant findings which helped in decision making are recorded  in this chart.     The plan was discussed with the patient. We gave him ample opportunities to ask questions. All questions were answered to his satisfaction and he verbalized understanding.      MD Collin Olivo APRN.

## 2025-04-08 NOTE — PROGRESS NOTES
Tasia Garcia is a 39 y.o. female who presents in stable condition for C1D1 infusion after seeing her provider this afternoon    She is on the following chemotherapy regimen:     Treatment Plans       Name Type Plan Dates Plan Provider         Active    ([facility]) [id number] Arms A, B, & C - Fianlimab / Cemiplimab or Pembrolizumab, 21 Day Cycles Oncology Treatment 4/7/2025 - Present BEN Prado-CNP             She reports to be doing well.  Overall, she states her energy level is stable.  Her appetite & hydration status is good.      Patient tolerated infusion well and has been educated with the overall therapy plan. First dose education and red chemotherapy information packet provided. Questions & concerns addressed by her provider during visit. Patient discharged in stable condition.    Form W-9 emailed to research coordinator and  for form completion.    Reviewed and approved by PROSPER JONES on 4/8/25 at 5:30 PM.

## 2025-04-08 NOTE — PROGRESS NOTES
Research Note Treatment Day    Tasia Garcia is here today for treatment on [id number]. Today is C1D1. Procedures completed per protocol. AE's and con-meds reviewed with patient. Patient is aware of treatment plan.    [x]   Received treatment as planned   OR  []    Treatment delayed; patient calendar updated as required   Treatment delayed because:    []   AE    []   Physician Discretion    []   Clinical Deterioration or Progression     []   Other    Education Documentation  Changes in Appetite, taught by Slime Mulligan RN at 4/8/2025  4:35 PM.  Learner: Patient  Readiness: Eager  Method: Explanation  Response: Verbalizes Understanding    Skin and Nail Changes, taught by Slime Mulligan RN at 4/8/2025  4:35 PM.  Learner: Patient  Readiness: Eager  Method: Explanation  Response: Verbalizes Understanding    Changes in Urination, taught by Slime Mulligan RN at 4/8/2025  4:35 PM.  Learner: Patient  Readiness: Eager  Method: Explanation  Response: Verbalizes Understanding    Nausea Management, taught by Slime Mulligan RN at 4/8/2025  4:35 PM.  Learner: Patient  Readiness: Eager  Method: Explanation  Response: Verbalizes Understanding    Fatigue, taught by Slime Mulligan RN at 4/8/2025  4:35 PM.  Learner: Patient  Readiness: Eager  Method: Explanation  Response: Verbalizes Understanding    Treatment Plan and Schedule, taught by Slime Mulligan RN at 4/8/2025  4:35 PM.  Learner: Patient  Readiness: Eager  Method: Explanation  Response: Verbalizes Understanding    Education Comments  No comments found.

## 2025-04-09 ENCOUNTER — APPOINTMENT (OUTPATIENT)
Dept: HEMATOLOGY/ONCOLOGY | Facility: HOSPITAL | Age: 40
End: 2025-04-09
Payer: COMMERCIAL

## 2025-04-15 DIAGNOSIS — C43.4 MALIGNANT MELANOMA OF SCALP (MULTI): Primary | ICD-10-CM

## 2025-04-15 RX ORDER — NORGESTREL 0.07 MG/1
1 TABLET ORAL DAILY
Qty: 28 EACH | Refills: 0 | Status: SHIPPED | OUTPATIENT
Start: 2025-04-15

## 2025-04-17 ENCOUNTER — DOCUMENTATION (OUTPATIENT)
Dept: HEMATOLOGY/ONCOLOGY | Facility: HOSPITAL | Age: 40
End: 2025-04-17
Payer: COMMERCIAL

## 2025-04-17 NOTE — PROGRESS NOTES
Research Note    Tasia Garcia was called to follow up on [id number]. Patient is feeling good and has no new symptoms to report. Patient will call if there are any changes. Patient is aware of treatment plan.

## 2025-04-18 DIAGNOSIS — C43.9 MELANOMA OF SKIN (MULTI): ICD-10-CM

## 2025-04-29 ENCOUNTER — INFUSION (OUTPATIENT)
Dept: HEMATOLOGY/ONCOLOGY | Facility: HOSPITAL | Age: 40
End: 2025-04-29
Payer: COMMERCIAL

## 2025-04-29 ENCOUNTER — LAB (OUTPATIENT)
Dept: LAB | Facility: HOSPITAL | Age: 40
End: 2025-04-29
Payer: COMMERCIAL

## 2025-04-29 ENCOUNTER — OFFICE VISIT (OUTPATIENT)
Dept: HEMATOLOGY/ONCOLOGY | Facility: HOSPITAL | Age: 40
End: 2025-04-29
Payer: COMMERCIAL

## 2025-04-29 ENCOUNTER — DOCUMENTATION (OUTPATIENT)
Dept: HEMATOLOGY/ONCOLOGY | Facility: HOSPITAL | Age: 40
End: 2025-04-29

## 2025-04-29 VITALS
TEMPERATURE: 95.4 F | OXYGEN SATURATION: 100 % | DIASTOLIC BLOOD PRESSURE: 63 MMHG | RESPIRATION RATE: 16 BRPM | BODY MASS INDEX: 37.24 KG/M2 | WEIGHT: 197.5 LBS | HEART RATE: 64 BPM | SYSTOLIC BLOOD PRESSURE: 118 MMHG

## 2025-04-29 DIAGNOSIS — C43.4 MALIGNANT MELANOMA OF SCALP (MULTI): ICD-10-CM

## 2025-04-29 DIAGNOSIS — C43.4 MALIGNANT MELANOMA OF SCALP (MULTI): Primary | ICD-10-CM

## 2025-04-29 DIAGNOSIS — Z51.12 ENCOUNTER FOR ANTINEOPLASTIC IMMUNOTHERAPY: ICD-10-CM

## 2025-04-29 DIAGNOSIS — Z00.6 PATIENT IN CLINICAL RESEARCH STUDY: ICD-10-CM

## 2025-04-29 LAB
ALBUMIN SERPL BCP-MCNC: 4.1 G/DL (ref 3.4–5)
ALP SERPL-CCNC: 65 U/L (ref 33–110)
ALT SERPL W P-5'-P-CCNC: 13 U/L (ref 7–45)
ANION GAP SERPL CALC-SCNC: 12 MMOL/L (ref 10–20)
APPEARANCE UR: CLEAR
APTT PPP: 27 SECONDS (ref 26–36)
AST SERPL W P-5'-P-CCNC: 17 U/L (ref 9–39)
B-HCG SERPL-ACNC: <3 MIU/ML
BACTERIA #/AREA URNS AUTO: ABNORMAL /HPF
BASOPHILS # BLD AUTO: 0.03 X10*3/UL (ref 0–0.1)
BASOPHILS NFR BLD AUTO: 0.5 %
BILIRUB SERPL-MCNC: 0.6 MG/DL (ref 0–1.2)
BILIRUB UR STRIP.AUTO-MCNC: NEGATIVE MG/DL
BUN SERPL-MCNC: 13 MG/DL (ref 6–23)
CALCIUM SERPL-MCNC: 9.2 MG/DL (ref 8.6–10.3)
CHLORIDE SERPL-SCNC: 106 MMOL/L (ref 98–107)
CK SERPL-CCNC: 75 U/L (ref 0–215)
CO2 SERPL-SCNC: 25 MMOL/L (ref 21–32)
COLOR UR: ABNORMAL
CREAT SERPL-MCNC: 0.61 MG/DL (ref 0.5–1.05)
CRP SERPL-MCNC: 0.31 MG/DL
EGFRCR SERPLBLD CKD-EPI 2021: >90 ML/MIN/1.73M*2
EOSINOPHIL # BLD AUTO: 0.08 X10*3/UL (ref 0–0.7)
EOSINOPHIL NFR BLD AUTO: 1.3 %
ERYTHROCYTE [DISTWIDTH] IN BLOOD BY AUTOMATED COUNT: 12.4 % (ref 11.5–14.5)
FERRITIN SERPL-MCNC: 17 NG/ML (ref 8–150)
GLUCOSE SERPL-MCNC: 102 MG/DL (ref 74–99)
GLUCOSE UR STRIP.AUTO-MCNC: NORMAL MG/DL
HCT VFR BLD AUTO: 38.8 % (ref 36–46)
HGB BLD-MCNC: 12.9 G/DL (ref 12–16)
IMM GRANULOCYTES # BLD AUTO: 0.02 X10*3/UL (ref 0–0.7)
IMM GRANULOCYTES NFR BLD AUTO: 0.3 % (ref 0–0.9)
INR PPP: 0.9 (ref 0.9–1.1)
KETONES UR STRIP.AUTO-MCNC: NEGATIVE MG/DL
LDH SERPL L TO P-CCNC: 117 U/L (ref 84–246)
LEUKOCYTE ESTERASE UR QL STRIP.AUTO: NEGATIVE
LYMPHOCYTES # BLD AUTO: 1.51 X10*3/UL (ref 1.2–4.8)
LYMPHOCYTES NFR BLD AUTO: 24.9 %
MAGNESIUM SERPL-MCNC: 2.11 MG/DL (ref 1.6–2.4)
MCH RBC QN AUTO: 30.1 PG (ref 26–34)
MCHC RBC AUTO-ENTMCNC: 33.2 G/DL (ref 32–36)
MCV RBC AUTO: 90 FL (ref 80–100)
MONOCYTES # BLD AUTO: 0.46 X10*3/UL (ref 0.1–1)
MONOCYTES NFR BLD AUTO: 7.6 %
MUCOUS THREADS #/AREA URNS AUTO: ABNORMAL /LPF
NEUTROPHILS # BLD AUTO: 3.96 X10*3/UL (ref 1.2–7.7)
NEUTROPHILS NFR BLD AUTO: 65.4 %
NITRITE UR QL STRIP.AUTO: NEGATIVE
NRBC BLD-RTO: 0 /100 WBCS (ref 0–0)
PH UR STRIP.AUTO: 5.5 [PH]
PLATELET # BLD AUTO: 276 X10*3/UL (ref 150–450)
POTASSIUM SERPL-SCNC: 4.1 MMOL/L (ref 3.5–5.3)
PROT SERPL-MCNC: 7.1 G/DL (ref 6.4–8.2)
PROT UR STRIP.AUTO-MCNC: NEGATIVE MG/DL
PROTHROMBIN TIME: 9.6 SECONDS (ref 9.8–12.4)
RBC # BLD AUTO: 4.29 X10*6/UL (ref 4–5.2)
RBC # UR STRIP.AUTO: ABNORMAL MG/DL
RBC #/AREA URNS AUTO: ABNORMAL /HPF
SODIUM SERPL-SCNC: 139 MMOL/L (ref 136–145)
SP GR UR STRIP.AUTO: 1.03
SQUAMOUS #/AREA URNS AUTO: ABNORMAL /HPF
URATE SERPL-MCNC: 2.9 MG/DL (ref 2.3–6.7)
UROBILINOGEN UR STRIP.AUTO-MCNC: NORMAL MG/DL
WBC # BLD AUTO: 6.1 X10*3/UL (ref 4.4–11.3)
WBC #/AREA URNS AUTO: ABNORMAL /HPF

## 2025-04-29 PROCEDURE — 86140 C-REACTIVE PROTEIN: CPT

## 2025-04-29 PROCEDURE — 84075 ASSAY ALKALINE PHOSPHATASE: CPT

## 2025-04-29 PROCEDURE — 84550 ASSAY OF BLOOD/URIC ACID: CPT

## 2025-04-29 PROCEDURE — 83615 LACTATE (LD) (LDH) ENZYME: CPT

## 2025-04-29 PROCEDURE — C9399 UNCLASSIFIED DRUGS OR BIOLOG: HCPCS | Mod: SE

## 2025-04-29 PROCEDURE — 82550 ASSAY OF CK (CPK): CPT

## 2025-04-29 PROCEDURE — 85025 COMPLETE CBC W/AUTO DIFF WBC: CPT

## 2025-04-29 PROCEDURE — 96413 CHEMO IV INFUSION 1 HR: CPT

## 2025-04-29 PROCEDURE — 85610 PROTHROMBIN TIME: CPT

## 2025-04-29 PROCEDURE — 85730 THROMBOPLASTIN TIME PARTIAL: CPT

## 2025-04-29 PROCEDURE — 36415 COLL VENOUS BLD VENIPUNCTURE: CPT

## 2025-04-29 PROCEDURE — 84702 CHORIONIC GONADOTROPIN TEST: CPT

## 2025-04-29 PROCEDURE — 81001 URINALYSIS AUTO W/SCOPE: CPT

## 2025-04-29 PROCEDURE — 83735 ASSAY OF MAGNESIUM: CPT

## 2025-04-29 PROCEDURE — 99214 OFFICE O/P EST MOD 30 MIN: CPT

## 2025-04-29 PROCEDURE — 82728 ASSAY OF FERRITIN: CPT

## 2025-04-29 PROCEDURE — 2560000001 HC RX 256 EXPERIMENTAL DRUGS: Mod: SE

## 2025-04-29 RX ORDER — PROCHLORPERAZINE MALEATE 10 MG
10 TABLET ORAL EVERY 6 HOURS PRN
Status: DISCONTINUED | OUTPATIENT
Start: 2025-04-29 | End: 2025-04-29 | Stop reason: HOSPADM

## 2025-04-29 RX ORDER — ALBUTEROL SULFATE 0.83 MG/ML
3 SOLUTION RESPIRATORY (INHALATION) AS NEEDED
Status: CANCELLED | OUTPATIENT
Start: 2025-04-29

## 2025-04-29 RX ORDER — PROCHLORPERAZINE EDISYLATE 5 MG/ML
10 INJECTION INTRAMUSCULAR; INTRAVENOUS EVERY 6 HOURS PRN
Status: CANCELLED | OUTPATIENT
Start: 2025-04-29

## 2025-04-29 RX ORDER — FAMOTIDINE 10 MG/ML
20 INJECTION, SOLUTION INTRAVENOUS ONCE AS NEEDED
Status: CANCELLED | OUTPATIENT
Start: 2025-04-29

## 2025-04-29 RX ORDER — DIPHENHYDRAMINE HYDROCHLORIDE 50 MG/ML
50 INJECTION, SOLUTION INTRAMUSCULAR; INTRAVENOUS AS NEEDED
Status: CANCELLED | OUTPATIENT
Start: 2025-04-29

## 2025-04-29 RX ORDER — PROCHLORPERAZINE EDISYLATE 5 MG/ML
10 INJECTION INTRAMUSCULAR; INTRAVENOUS EVERY 6 HOURS PRN
Status: DISCONTINUED | OUTPATIENT
Start: 2025-04-29 | End: 2025-04-29 | Stop reason: HOSPADM

## 2025-04-29 RX ORDER — EPINEPHRINE 0.3 MG/.3ML
0.3 INJECTION SUBCUTANEOUS EVERY 5 MIN PRN
Status: DISCONTINUED | OUTPATIENT
Start: 2025-04-29 | End: 2025-04-29 | Stop reason: HOSPADM

## 2025-04-29 RX ORDER — PROCHLORPERAZINE MALEATE 10 MG
10 TABLET ORAL EVERY 6 HOURS PRN
Status: CANCELLED | OUTPATIENT
Start: 2025-04-29

## 2025-04-29 RX ORDER — DIPHENHYDRAMINE HYDROCHLORIDE 50 MG/ML
50 INJECTION, SOLUTION INTRAMUSCULAR; INTRAVENOUS AS NEEDED
Status: DISCONTINUED | OUTPATIENT
Start: 2025-04-29 | End: 2025-04-29 | Stop reason: HOSPADM

## 2025-04-29 RX ORDER — EPINEPHRINE 0.3 MG/.3ML
0.3 INJECTION SUBCUTANEOUS EVERY 5 MIN PRN
Status: CANCELLED | OUTPATIENT
Start: 2025-04-29

## 2025-04-29 RX ORDER — FAMOTIDINE 10 MG/ML
20 INJECTION, SOLUTION INTRAVENOUS ONCE AS NEEDED
Status: DISCONTINUED | OUTPATIENT
Start: 2025-04-29 | End: 2025-04-29 | Stop reason: HOSPADM

## 2025-04-29 RX ORDER — ALBUTEROL SULFATE 0.83 MG/ML
3 SOLUTION RESPIRATORY (INHALATION) AS NEEDED
Status: DISCONTINUED | OUTPATIENT
Start: 2025-04-29 | End: 2025-04-29 | Stop reason: HOSPADM

## 2025-04-29 RX ADMIN — Medication 1 DOSE: at 10:17

## 2025-04-29 ASSESSMENT — ENCOUNTER SYMPTOMS
HEMATURIA: 0
ADENOPATHY: 0
SCLERAL ICTERUS: 0
FEVER: 0
NAUSEA: 0
CHILLS: 0
PALPITATIONS: 0
FLANK PAIN: 0
APPETITE CHANGE: 0
FREQUENCY: 0
ABDOMINAL PAIN: 0
EYE PROBLEMS: 0
TROUBLE SWALLOWING: 0
MYALGIAS: 0
SORE THROAT: 0
LEG SWELLING: 0
NERVOUS/ANXIOUS: 0
HEMOPTYSIS: 0
EXTREMITY WEAKNESS: 0
DIFFICULTY URINATING: 0
DIARRHEA: 0
CHEST TIGHTNESS: 0
DIZZINESS: 0
CONFUSION: 0
SHORTNESS OF BREATH: 0
COUGH: 0
HOT FLASHES: 0
FATIGUE: 0
VOMITING: 0
DEPRESSION: 0
DYSURIA: 0
BACK PAIN: 0
SEIZURES: 0
CONSTIPATION: 0

## 2025-04-29 ASSESSMENT — PAIN SCALES - GENERAL: PAINLEVEL_OUTOF10: 0-NO PAIN

## 2025-04-29 NOTE — PROGRESS NOTES
Research Note Treatment Day    Tasia Garcia is here today for treatment on [id number]. Today is C2D1. Procedures completed per protocol. AE's and con-meds reviewed with patient. Patient is aware of treatment plan. Patient states feeling very well since last treatment.  Pt confirms that she is taking O-Pill and remains sexually abstinent at this time.  Patient states she just completed 2nd menstrual cycle on O-pill, plans to remain abstinent through next menstrual cycle and confirms that she will continue O-pill and partner will use condoms when sexual activity resumes. Pt examined by LETTY PATEL, labs reveiwed, pt cleared for treatment.  Provided calendar.  Pt instructed to contact team with any questions, problems, or concerns    [x]   Received treatment as planned   OR  []    Treatment delayed; patient calendar updated as required   Treatment delayed because:    []   AE    []   Physician Discretion    []   Clinical Deterioration or Progression     []   Other    Education Documentation  No documentation found.  Education Comments  No comments found.

## 2025-04-29 NOTE — PROGRESS NOTES
-Patient presents to the clinic today for study [id number]  -Patient tolerated infusion well  -Patient discharged in stable condition. Reviewed calendar/next appointment, all questions answered.   -Patient ambulated out of clinic with ease with dad.   -Notes/Plan for next visit-

## 2025-04-29 NOTE — PROGRESS NOTES
.Patient ID: Tasia Garcia is a 39 y.o. female.  Referring Physician: Wes Veloz MD  [id number] [place] [address]  Primary Care Provider: Arya Ku MD     No chief complaint on file.     Oncology History Overview Note   Ms. Tasia Garcia is diagnosed with stage IIb melanoma of the scalp.  She first presented to ENT oncology in November 2020 for and underwent wide local excision and sentinel lymph node biopsy on 12/5/2024 which showed a 3 mm deep cutaneous melanoma positive on the margins.  The sentinel lymph node was traced to the parotid lymph node which was negative for an deposit.  Since the margin was positive, the patient was referred for Mohs surgery which he completed on 1/22/2025 with Dr. Tevin Anne and a negative margin was achieved.  The final Breslow depth was labeled at 5 mm, no ulceration, no LVI-pT4a melanoma.  PET/CT scan on 1/6/2025 was negative for any distant metastatic deposits.  MRI brain on 2/13/2025 was negative for intracranial deposits. We spoke to the patient and she agreed to participate in REGE 2623     Malignant melanoma of scalp (Multi)   11/15/2024 Initial Diagnosis    Referring Surgeon: Enzo Russell and Tevin Anne   Dermatologist: Rosio Velazquez   Date of first meeting with our team: 02/12/2025    Date of First meeting with surgical team: 11/12/2024  Melanoma type: Cutaneous  Location of primary site: Scalp-primary dermal  Date of WLE and SLNB: Initial resection on 12/5/2024 (Dr. Madelin Russell) followed by Mohs surgery (Dr. Tevin Anne) to clear margins on 1/22/25.  Final pathology: Breslow Depth-5 mm; Ulceration status-negative; LVI-negative; Other high risk features-negative  [place] lymph node status: 1 node examined in the parotid gland-found to be negative for malignancy  Final Stage: pT4a N0 M0-stage IIb melanoma- AJCC 8th Ed.    BRAF status: Not known  Initial MRI brain with and without contrast:  "2/13/2025-unremarkable  Initial Full body PET scan: 01/6/2025-unremarkable       1/1/2025 Cancer Staged    Staging form: Melanoma of the Skin, AJCC 8th Edition, Pathologic stage from 1/1/2025: Stage IIB (pT4a, pN0, cM0) - Signed by Wes Veloz MD on 2/13/2025 4/8/2025 -  Research Study Participant    ([facility]) [id number] Arms A, B, & C - Fianlimab / Cemiplimab or Pembrolizumab, 21 Day Cycles  Plan Provider: BEN Prado-CNP  Treatment goal: Curative  Line of treatment: Adjuvant  Associated studies: Fianlimab and Cemiplimab Versus Pembrolizumab in Resected High-risk Melanoma        Ms. Tasia Garcia is diagnosed with stage IIB melanoma- ([id number])- AJCC 8th Ed. She initially underwent wide local excision on 12/5/24 with Dr. Madelin Russell, but the margin was positive. She was then sent to Dr. Tevin Anne for Mohs surgery which was done on 1/22/25 and the margin was cleared. The sentinel nodes were negative. She met with medical oncology and decided to participate in the REGE 2623 trial.     Signed consent on [id number]. Double blind study with Fianlimab (Anti-LAG-3) and Cemiplimab Versus Pembrolizumab in the Adjuvant Setting.    C1: 04.08.25  C2: 04.29.25      Subjective   Please refer to \"Notes/Cancer History\" above for complete History of present illness.     Today 4/29/2025  Patient in clinic for C2D1 on [id number].  Reports she tolerated the treatment well and denies any side effects from the treatment. She reports she continues to be active. Patient denies any pain, dizziness, lightheadedness, headaches, rash/itching, chills or fever, SOB, cough, sputum, chest pain or chest tightness, painful inflammation/ulceration oral mucous membranes, nausea/vomiting, diarrhea/constipation, hematemesis /hemoptysis, hematuria/rectal bleeding, urinary symptoms, swelling extremities, weakness/fatigue, or peripheral neuropathy. ROS as above. Remainder of 10 point review of systems elicited and otherwise " unremarkable.     Review of Systems:   Review of Systems   Constitutional:  Negative for appetite change, chills, fatigue and fever.   HENT:   Negative for hearing loss, lump/mass, mouth sores, sore throat and trouble swallowing.    Eyes:  Negative for eye problems and icterus.   Respiratory:  Negative for chest tightness, cough, hemoptysis and shortness of breath.    Cardiovascular:  Negative for chest pain, leg swelling and palpitations.   Gastrointestinal:  Negative for abdominal pain, constipation, diarrhea, nausea and vomiting.   Endocrine: Negative for hot flashes.   Genitourinary:  Negative for difficulty urinating, dysuria, frequency and hematuria.    Musculoskeletal:  Negative for back pain, flank pain, gait problem and myalgias.   Skin:  Negative for itching and rash.   Neurological:  Negative for dizziness, extremity weakness, gait problem and seizures.   Hematological:  Negative for adenopathy.   Psychiatric/Behavioral:  Negative for confusion and depression. The patient is not nervous/anxious.          MEDICAL HISTORY  Past Medical History:   Diagnosis Date    Dental disease 07/2024    Melanoma of scalp (Multi)     Personal history of other diseases of the female genital tract     History of infertility       FAMILY HISTORY  Family History   Problem Relation Name Age of Onset    Other (cervical carcinoma) Mother Kimberly fallon     Cancer Mother Kimberly fallon     Stroke Mother Kimberly fallon     Ovarian cancer Mother Kimberly fallon 45    Cerebral palsy Sister      Breast cancer Paternal Grandmother Laura Fallon         AGE UNKNOWN       TOBACCO HISTORY  Tobacco Use: Low Risk  (4/8/2025)    Patient History     Smoking Tobacco Use: Never     Smokeless Tobacco Use: Never     Passive Exposure: Never       SOCIAL HISTORY  Social Connections: Moderately Integrated (2/12/2025)    Social Connection and Isolation Panel [NHANES]     Frequency of Communication with Friends and Family: More than three times a week      "Frequency of Social Gatherings with Friends and Family: More than three times a week     Attends [employer] Services: More than 4 times per year     Active Member of Clubs or Organizations: Yes     Attends Club or Organization Meetings: More than 4 times per year     Marital Status:    Recent Concern: Social Connections - At Risk (1/25/2025)    Received from [facility]    Social [employer]     Connectedness: 2        Outpatient Medication Profile:  Current Outpatient Medications on File Prior to Visit   Medication Sig Dispense Refill    norgestrel (Opill) 0.075 mg tablet Take 1 tablet by mouth once daily. This is not used for an Emergency Contraceptive but taken daily 28 each 0    prenatal vit,calc76-iron-folic (Prenatabs Rx) 29 mg iron- 1 mg tablet Take 1 tablet by mouth once daily.       No current facility-administered medications on file prior to visit.         Performance Status:  ECOG 0 : Fully active, able to carry on all pre-disease performance without restriction      Vitals and Measurements:   /63 (BP Location: Right arm, Patient Position: Sitting, BP Cuff Size: Large adult)   Pulse 64   Temp 35.2 °C (95.4 °F) (Skin)   Resp 16   Wt 89.6 kg (197 lb 8 oz)   SpO2 100%   BMI 37.24 kg/m²      Daily Weight  04/29/25 : 89.6 kg (197 lb 8 oz)  04/08/25 : 90.3 kg (199 lb)  04/08/25 : 90.6 kg (199 lb 11.8 oz)  04/01/25 : 88.9 kg (196 lb)  02/12/25 : 87.5 kg (193 lb)         12/5/2024     5:30 PM 1/27/2025    10:01 AM 2/12/2025     3:04 PM 4/1/2025     9:51 AM 4/8/2025    12:58 PM 4/8/2025     1:34 PM 4/29/2025     8:23 AM   Vitals   Systolic 140  124 124 132 124 118   Diastolic 82  88 67 62 65 63   BP Location   Left arm Right arm Left arm Right arm Right arm   Heart Rate 60  79 72 77 74 64   Temp   35.6 °C (96.1 °F) 36.1 °C (97 °F) 36.3 °C (97.3 °F) 36 °C (96.8 °F) 35.2 °C (95.4 °F)   Resp 13  16 16 20 18 16   Height  1.564 m (5' 1.58\") 1.551 m (5' 1.06\")       Weight (lb)  185.63 193 196 199.74 199 " 197.5   BMI  34.42 kg/m2 36.39 kg/m2 36.96 kg/m2 37.66 kg/m2 37.52 kg/m2 37.24 kg/m2   BSA (m2)  1.91 m2 1.94 m2 1.96 m2 1.98 m2 1.97 m2 1.96 m2   Visit Report   Report Report Report Report Report          Physical Exam:   Physical Exam  Vitals reviewed.   Constitutional:       Appearance: Normal appearance.   HENT:      Head: Normocephalic.      Mouth/Throat:      Mouth: Mucous membranes are moist.      Tongue: No lesions.      Palate: No mass.      Pharynx: Oropharynx is clear.   Eyes:      Extraocular Movements: Extraocular movements intact.      Conjunctiva/sclera: Conjunctivae normal.      Pupils: Pupils are equal, round, and reactive to light.   Cardiovascular:      Rate and Rhythm: Normal rate and regular rhythm.      Heart sounds: Normal heart sounds, S1 normal and S2 normal.   Pulmonary:      Effort: Pulmonary effort is normal. No respiratory distress.      Breath sounds: Normal breath sounds.   Abdominal:      General: Bowel sounds are normal.      Palpations: Abdomen is soft.   Musculoskeletal:         General: Normal range of motion.      Cervical back: Full passive range of motion without pain, normal range of motion and neck supple.   Lymphadenopathy:      Cervical: No cervical adenopathy.   Skin:     General: Skin is warm and dry.      Capillary Refill: Capillary refill takes less than 2 seconds.      Comments: Scan lesion well healed. New benign lesion removed from the back by dermatology.   Neurological:      General: No focal deficit present.      Cranial Nerves: Cranial nerves 2-12 are intact.   Psychiatric:         Attention and Perception: Attention normal.         Mood and Affect: Mood normal.         Behavior: Behavior is cooperative.         Cognition and Memory: Cognition normal.         Judgment: Judgment normal.         Lab Results:  I have reviewed these laboratory results:     Lab on 04/29/2025   Component Date Value Ref Range Status    WBC 04/29/2025 6.1  4.4 - 11.3 x10*3/uL Final     nRBC 04/29/2025 0.0  0.0 - 0.0 /100 WBCs Final    RBC 04/29/2025 4.29  4.00 - 5.20 x10*6/uL Final    Hemoglobin 04/29/2025 12.9  12.0 - 16.0 g/dL Final    Hematocrit 04/29/2025 38.8  36.0 - 46.0 % Final    MCV 04/29/2025 90  80 - 100 fL Final    MCH 04/29/2025 30.1  26.0 - 34.0 pg Final    MCHC 04/29/2025 33.2  32.0 - 36.0 g/dL Final    RDW 04/29/2025 12.4  11.5 - 14.5 % Final    Platelets 04/29/2025 276  150 - 450 x10*3/uL Final    Neutrophils % 04/29/2025 65.4  40.0 - 80.0 % Final    Immature Granulocytes %, Automated 04/29/2025 0.3  0.0 - 0.9 % Final    Lymphocytes % 04/29/2025 24.9  13.0 - 44.0 % Final    Monocytes % 04/29/2025 7.6  2.0 - 10.0 % Final    Eosinophils % 04/29/2025 1.3  0.0 - 6.0 % Final    Basophils % 04/29/2025 0.5  0.0 - 2.0 % Final    Neutrophils Absolute 04/29/2025 3.96  1.20 - 7.70 x10*3/uL Final    Immature Granulocytes Absolute, Au* 04/29/2025 0.02  0.00 - 0.70 x10*3/uL Final    Lymphocytes Absolute 04/29/2025 1.51  1.20 - 4.80 x10*3/uL Final    Monocytes Absolute 04/29/2025 0.46  0.10 - 1.00 x10*3/uL Final    Eosinophils Absolute 04/29/2025 0.08  0.00 - 0.70 x10*3/uL Final    Basophils Absolute 04/29/2025 0.03  0.00 - 0.10 x10*3/uL Final    Glucose 04/29/2025 102 (H)  74 - 99 mg/dL Final    Sodium 04/29/2025 139  136 - 145 mmol/L Final    Potassium 04/29/2025 4.1  3.5 - 5.3 mmol/L Final    Chloride 04/29/2025 106  98 - 107 mmol/L Final    Bicarbonate 04/29/2025 25  21 - 32 mmol/L Final    Anion Gap 04/29/2025 12  10 - 20 mmol/L Final    Urea Nitrogen 04/29/2025 13  6 - 23 mg/dL Final    Creatinine 04/29/2025 0.61  0.50 - 1.05 mg/dL Final    eGFR 04/29/2025 >90  >60 mL/min/1.73m*2 Final    Calcium 04/29/2025 9.2  8.6 - 10.3 mg/dL Final    Albumin 04/29/2025 4.1  3.4 - 5.0 g/dL Final    Alkaline Phosphatase 04/29/2025 65  33 - 110 U/L Final    Total Protein 04/29/2025 7.1  6.4 - 8.2 g/dL Final    AST 04/29/2025 17  9 - 39 U/L Final    Bilirubin, Total 04/29/2025 0.6  0.0 - 1.2 mg/dL Final     ALT 04/29/2025 13  7 - 45 U/L Final    HCG, Beta-Quantitative 04/29/2025 <3  <5 mIU/mL Final    aPTT 04/29/2025 27  26 - 36 seconds Final    Creatine Kinase 04/29/2025 75  0 - 215 U/L Final    C-Reactive Protein 04/29/2025 0.31  <1.00 mg/dL Final    Ferritin 04/29/2025 17  8 - 150 ng/mL Final    LDH 04/29/2025 117  84 - 246 U/L Final    Magnesium 04/29/2025 2.11  1.60 - 2.40 mg/dL Final    Protime 04/29/2025 9.6 (L)  9.8 - 12.4 seconds Final    INR 04/29/2025 0.9  0.9 - 1.1 Final    Uric Acid 04/29/2025 2.9  2.3 - 6.7 mg/dL Final   Lab on 04/08/2025   Component Date Value Ref Range Status    WBC 04/08/2025 6.9  4.4 - 11.3 x10*3/uL Final    nRBC 04/08/2025 0.0  0.0 - 0.0 /100 WBCs Final    RBC 04/08/2025 4.81  4.00 - 5.20 x10*6/uL Final    Hemoglobin 04/08/2025 14.3  12.0 - 16.0 g/dL Final    Hematocrit 04/08/2025 42.7  36.0 - 46.0 % Final    MCV 04/08/2025 89  80 - 100 fL Final    MCH 04/08/2025 29.7  26.0 - 34.0 pg Final    MCHC 04/08/2025 33.5  32.0 - 36.0 g/dL Final    RDW 04/08/2025 12.6  11.5 - 14.5 % Final    Platelets 04/08/2025 377  150 - 450 x10*3/uL Final    Neutrophils % 04/08/2025 59.8  40.0 - 80.0 % Final    Immature Granulocytes %, Automated 04/08/2025 0.4  0.0 - 0.9 % Final    Lymphocytes % 04/08/2025 34.9  13.0 - 44.0 % Final    Monocytes % 04/08/2025 3.9  2.0 - 10.0 % Final    Eosinophils % 04/08/2025 0.6  0.0 - 6.0 % Final    Basophils % 04/08/2025 0.4  0.0 - 2.0 % Final    Neutrophils Absolute 04/08/2025 4.11  1.20 - 7.70 x10*3/uL Final    Immature Granulocytes Absolute, Au* 04/08/2025 0.03  0.00 - 0.70 x10*3/uL Final    Lymphocytes Absolute 04/08/2025 2.40  1.20 - 4.80 x10*3/uL Final    Monocytes Absolute 04/08/2025 0.27  0.10 - 1.00 x10*3/uL Final    Eosinophils Absolute 04/08/2025 0.04  0.00 - 0.70 x10*3/uL Final    Basophils Absolute 04/08/2025 0.03  0.00 - 0.10 x10*3/uL Final    Glucose 04/08/2025 93  74 - 99 mg/dL Final    Sodium 04/08/2025 139  136 - 145 mmol/L Final    Potassium 04/08/2025  3.7  3.5 - 5.3 mmol/L Final    Chloride 04/08/2025 103  98 - 107 mmol/L Final    Bicarbonate 04/08/2025 28  21 - 32 mmol/L Final    Anion Gap 04/08/2025 12  10 - 20 mmol/L Final    Urea Nitrogen 04/08/2025 10  6 - 23 mg/dL Final    Creatinine 04/08/2025 0.67  0.50 - 1.05 mg/dL Final    eGFR 04/08/2025 >90  >60 mL/min/1.73m*2 Final    Calcium 04/08/2025 9.1  8.6 - 10.3 mg/dL Final    Albumin 04/08/2025 4.9  3.4 - 5.0 g/dL Final    Alkaline Phosphatase 04/08/2025 84  33 - 110 U/L Final    Total Protein 04/08/2025 8.1  6.4 - 8.2 g/dL Final    AST 04/08/2025 20  9 - 39 U/L Final    Bilirubin, Total 04/08/2025 0.7  0.0 - 1.2 mg/dL Final    ALT 04/08/2025 13  7 - 45 U/L Final    HCG, Beta-Quantitative 04/08/2025 <3  <5 mIU/mL Final    aPTT 04/08/2025 28  26 - 36 seconds Final    Creatine Kinase 04/08/2025 128  0 - 215 U/L Final    C-Reactive Protein 04/08/2025 0.12  <1.00 mg/dL Final    Ferritin 04/08/2025 12  8 - 150 ng/mL Final    LDH 04/08/2025 175  84 - 246 U/L Final    Magnesium 04/08/2025 2.25  1.60 - 2.40 mg/dL Final    Protime 04/08/2025 10.5  9.8 - 12.4 seconds Final    INR 04/08/2025 1.0  0.9 - 1.1 Final    Uric Acid 04/08/2025 3.4  2.3 - 6.7 mg/dL Final    Color, Urine 04/08/2025 Yellow  Light-Yellow, Yellow, Dark-Yellow Final    Appearance, Urine 04/08/2025 Clear  Clear Final    Specific Gravity, Urine 04/08/2025 1.028  1.005 - 1.035 Final    pH, Urine 04/08/2025 6.5  5.0, 5.5, 6.0, 6.5, 7.0, 7.5, 8.0 Final    Protein, Urine 04/08/2025 10 (TRACE)  NEGATIVE, 10 (TRACE), 20 (TRACE) mg/dL Final    Glucose, Urine 04/08/2025 Normal  Normal mg/dL Final    Blood, Urine 04/08/2025 NEGATIVE  NEGATIVE mg/dL Final    Ketones, Urine 04/08/2025 NEGATIVE  NEGATIVE mg/dL Final    Bilirubin, Urine 04/08/2025 NEGATIVE  NEGATIVE mg/dL Final    Urobilinogen, Urine 04/08/2025 4 (2+) (A)  Normal mg/dL Final    Nitrite, Urine 04/08/2025 NEGATIVE  NEGATIVE Final    Leukocyte Esterase, Urine 04/08/2025 NEGATIVE  NEGATIVE Final     WBC, Urine 04/08/2025 1-5  1-5, NONE /HPF Final    RBC, Urine 04/08/2025 3-5  NONE, 1-2, 3-5 /HPF Final    Squamous Epithelial Cells, Urine 04/08/2025 1-9 (SPARSE)  Reference range not established. /HPF Final    Mucus, Urine 04/08/2025 FEW  Reference range not established. /LPF Final   Hospital Outpatient Visit on 04/01/2025   Component Date Value Ref Range Status    Ventricular Rate 04/01/2025 64  BPM Final    Atrial Rate 04/01/2025 64  BPM Final    UT Interval 04/01/2025 140  ms Final    QRS Duration 04/01/2025 76  ms Final    QT Interval 04/01/2025 422  ms Final    QTC Calculation(Bazett) 04/01/2025 435  ms Final    P Axis 04/01/2025 22  degrees Final    R Axis 04/01/2025 18  degrees Final    T Axis 04/01/2025 43  degrees Final    QRS Count 04/01/2025 10  beats Final    Q Onset 04/01/2025 223  ms Final    P Onset 04/01/2025 153  ms Final    P Offset 04/01/2025 195  ms Final    T Offset 04/01/2025 434  ms Final    QTC Fredericia 04/01/2025 431  ms Final   Lab on 04/01/2025   Component Date Value Ref Range Status    Cortisol  A.M. 04/01/2025 14.4  5.0 - 20.0 ug/dL Final    WBC 04/01/2025 6.3  4.4 - 11.3 x10*3/uL Final    nRBC 04/01/2025 0.0  0.0 - 0.0 /100 WBCs Final    RBC 04/01/2025 4.71  4.00 - 5.20 x10*6/uL Final    Hemoglobin 04/01/2025 14.0  12.0 - 16.0 g/dL Final    Hematocrit 04/01/2025 42.0  36.0 - 46.0 % Final    MCV 04/01/2025 89  80 - 100 fL Final    MCH 04/01/2025 29.7  26.0 - 34.0 pg Final    MCHC 04/01/2025 33.3  32.0 - 36.0 g/dL Final    RDW 04/01/2025 12.8  11.5 - 14.5 % Final    Platelets 04/01/2025 317  150 - 450 x10*3/uL Final    Neutrophils % 04/01/2025 59.6  40.0 - 80.0 % Final    Immature Granulocytes %, Automated 04/01/2025 0.3  0.0 - 0.9 % Final    Lymphocytes % 04/01/2025 32.6  13.0 - 44.0 % Final    Monocytes % 04/01/2025 6.2  2.0 - 10.0 % Final    Eosinophils % 04/01/2025 1.0  0.0 - 6.0 % Final    Basophils % 04/01/2025 0.3  0.0 - 2.0 % Final    Neutrophils Absolute 04/01/2025 3.73  1.20  - 7.70 x10*3/uL Final    Immature Granulocytes Absolute, Au* 04/01/2025 0.02  0.00 - 0.70 x10*3/uL Final    Lymphocytes Absolute 04/01/2025 2.04  1.20 - 4.80 x10*3/uL Final    Monocytes Absolute 04/01/2025 0.39  0.10 - 1.00 x10*3/uL Final    Eosinophils Absolute 04/01/2025 0.06  0.00 - 0.70 x10*3/uL Final    Basophils Absolute 04/01/2025 0.02  0.00 - 0.10 x10*3/uL Final    Glucose 04/01/2025 97  74 - 99 mg/dL Final    Sodium 04/01/2025 139  136 - 145 mmol/L Final    Potassium 04/01/2025 4.0  3.5 - 5.3 mmol/L Final    Chloride 04/01/2025 105  98 - 107 mmol/L Final    Bicarbonate 04/01/2025 25  21 - 32 mmol/L Final    Anion Gap 04/01/2025 13  10 - 20 mmol/L Final    Urea Nitrogen 04/01/2025 13  6 - 23 mg/dL Final    Creatinine 04/01/2025 0.72  0.50 - 1.05 mg/dL Final    eGFR 04/01/2025 >90  >60 mL/min/1.73m*2 Final    Calcium 04/01/2025 9.5  8.6 - 10.3 mg/dL Final    Albumin 04/01/2025 4.5  3.4 - 5.0 g/dL Final    Alkaline Phosphatase 04/01/2025 73  33 - 110 U/L Final    Total Protein 04/01/2025 7.7  6.4 - 8.2 g/dL Final    AST 04/01/2025 18  9 - 39 U/L Final    Bilirubin, Total 04/01/2025 0.6  0.0 - 1.2 mg/dL Final    ALT 04/01/2025 13  7 - 45 U/L Final    Magnesium 04/01/2025 2.17  1.60 - 2.40 mg/dL Final    LDH 04/01/2025 133  84 - 246 U/L Final    Uric Acid 04/01/2025 3.2  2.3 - 6.7 mg/dL Final    Creatine Kinase 04/01/2025 123  0 - 215 U/L Final    aPTT 04/01/2025 28  26 - 36 seconds Final    Protime 04/01/2025 10.0  9.8 - 12.4 seconds Final    INR 04/01/2025 0.9  0.9 - 1.1 Final    HCG, Beta-Quantitative 04/01/2025 <3  <5 mIU/mL Final    Troponin I, High Sensitivity ([facility]) 04/01/2025 <3  0 - 34 ng/L Final    HIV 1/2 Antigen/Antibody Screen wi* 04/01/2025 Nonreactive  Nonreactive Final    Hepatitis B Surface AG 04/01/2025 Nonreactive  Nonreactive Final    Hepatitis C AB 04/01/2025 Nonreactive  Nonreactive Final    Color, Urine 04/01/2025 Yellow  Light-Yellow, Yellow, Dark-Yellow Final    Appearance, Urine  04/01/2025 Clear  Clear Final    Specific Gravity, Urine 04/01/2025 1.035  1.005 - 1.035 Final    pH, Urine 04/01/2025 5.5  5.0, 5.5, 6.0, 6.5, 7.0, 7.5, 8.0 Final    Protein, Urine 04/01/2025 20 (TRACE)  NEGATIVE, 10 (TRACE), 20 (TRACE) mg/dL Final    Glucose, Urine 04/01/2025 Normal  Normal mg/dL Final    Blood, Urine 04/01/2025 1.0 (3+) (A)  NEGATIVE mg/dL Final    Ketones, Urine 04/01/2025 TRACE (A)  NEGATIVE mg/dL Final    Bilirubin, Urine 04/01/2025 NEGATIVE  NEGATIVE mg/dL Final    Urobilinogen, Urine 04/01/2025 Normal  Normal mg/dL Final    Nitrite, Urine 04/01/2025 NEGATIVE  NEGATIVE Final    Leukocyte Esterase, Urine 04/01/2025 NEGATIVE  NEGATIVE Final    WBC, Urine 04/01/2025 1-5  1-5, NONE /HPF Final    RBC, Urine 04/01/2025 11-20 (A)  NONE, 1-2, 3-5 /HPF Final    Squamous Epithelial Cells, Urine 04/01/2025 1-9 (SPARSE)  Reference range not established. /HPF Final    Mucus, Urine 04/01/2025 1+  Reference range not established. /LPF Final            Radiology Result:     CT chest abdomen pelvis w IV contrast    Result Date: 4/2/2025  Impression: Melanoma restaging scan compared to PET-CT 01/06/2025: 1. No evidence of disease. 2. Additional incidental non-acute findings as detailed above.     I personally reviewed the images/study and I agree with the findings as stated by Dr. Stephon Cardona. This study was interpreted at [place], [place].   MACRO: None.   Signed by: Rohith Martinez 4/2/2025 9:29 AM Dictation workstation:   [id number]    CT soft tissue neck w IV contrast    Result Date: 4/1/2025  Impression: There is no CT evidence of spine soft tissue mass or yara station lymphadenopathy.   I personally reviewed the images/study and I agree with the findings as stated. This study was interpreted at [place], [place].   MACRO: None   Signed by: Salvador Huynh 4/1/2025 4:58 PM Dictation workstation:   [id number]    MR  brain w and wo IV contrast    Result Date: 4/1/2025  Impression: 1. No definitive evidence of brain metastasis. 2. 6 mm lesion in the right frontal scalp is entirely nonspecific and correlation with physical exam findings is recommended. It is similar to slightly increased in size from 02/13/2025.       MACRO: None   Signed by: Nakia Chavez 4/1/2025 1:31 PM Dictation workstation:   [id number]       Pathology Results:  I have reviewed the full pathology report recorded in the EMR. The pertinent portions indicating diagnosis are listed here in the note. for details please refer to the full report recorded in the EMR.    Dermatopathology- DERM LAB: [id number]  Order: [id number]   Collected 1/22/2025 11:41       Status: Edited Result - FINAL       Visible to patient: Yes (seen)       Dx: Malignant melanoma of scalp (Multi)    0 Result Notes      Component    FINAL DIAGNOSIS   SKIN, LEFT CENTRAL FRONTAL SCALP, EXCISION:  MALIGNANT MELANOMA, BRESLOW THICKNESS AT LEAST 3 MM, PRESENT ON THE DEEP MARGIN, SEE NOTE.     Note: Microscopic examination reveals a specimen that extends into the subcutaneous fat. In the superficial and deep dermis there are nests and fascicles of atypical epithelioid and spindled cells that focally appear to involve the deep margin. There appears to be processing artifact from previous freezing which makes the interpretation of the mitotic rate challenging.     ** Electronically signed out by Bouchra Ayala MD **      Electronically signed by Bouchra Ayala MD on 1/27/2025 at 1111        Micrographic Surgery Details:  Post-operative length (cm): 1.8  Post-operative width (cm): 1.7  Number of Mohs stages: 1  Indication for sending permanent sections: evaluate a debulking specimen     Stage 1     Comments: The patient was brought into the operating room and placed in the procedure chair in the appropriate position.  The area positive by previous biopsy was identified and confirmed with the patient. The  area of clinically obvious tumor was debulked using a curette and/or scalpel as needed. An incision was made following the Mohs approach through the skin. The specimen was taken to the lab, divided into 3 piece(s) and appropriately chromacoded and processed.        Debulk:  5mm (will send for permanent)              Tumor features identified on Mohs section: no tumor identified      Depth of invasion: subcutaneous fat     Depth of defect: subcutaneous fat     Patient tolerance of procedure: tolerated well, no immediate complications     Assessment and Plan:   Assessment/Plan      Ms. Tasia Garcia is a 39 y.o. female with a diagnosis of stage IIb melanoma of the scalp. Please see the evolution of the case listed above in the cancer history.     Today 4.29.2025,   In past visit Dr Veloz discussed about different treatment options available for Melanoma. He also discussed the role of immunotherapy in the adjuvant setting. He shared info about SOC vs Clinical trial. Patient decided to participate on study and signed consent. She completed screening requirements for the study and started treatment on 4.8.2025. Today she is C2D1 treatment, She looks clinically good today we will proceed with treatment. Plan discussed in detail with the patient. Today we also educated the patient about Melanoma prevent prevention practice and regular dermatology examinations. Patient in agreement with the plan of care and is aware to call the office and research nurse if she has any questions. RTC per protocol.       # Stage 2B melanoma of scalp  - Proceed with C2D1 treatment on [id number].       DISCLAIMER:   In preparing for this visit and writing this note, I reviewed all the previous electronic medical records (labs, imaging and medical charts) of the patient available in the physician portal. Significant findings which helped in decision making are recorded  in this chart.     The plan was discussed with the patient. We  gave him ample opportunities to ask questions. All questions were answered to his satisfaction and he verbalized understanding.      MD Collin Olivo APRN.

## 2025-05-01 ENCOUNTER — DOCUMENTATION (OUTPATIENT)
Dept: HEMATOLOGY/ONCOLOGY | Facility: HOSPITAL | Age: 40
End: 2025-05-01
Payer: COMMERCIAL

## 2025-05-01 NOTE — PROGRESS NOTES
Research Note Unscheduled Visit     Tasia BlancoMonyLeeanne Garcia is enrolled on [id number].  She called in to discuss plan for scheduled visit for 7/1/25--states she will be unavailable all week for planned scans and visit due to work responsibilities.  D/W СЕРГЕЙ B[place] and plan is to adjust schedule to scans on 7/7/25 and visit 7/8/25.  D/W pt and she is agreeable to plan.      Education Documentation  No documentation found.  Education Comments  No comments found.

## 2025-05-05 ENCOUNTER — APPOINTMENT (OUTPATIENT)
Dept: OTOLARYNGOLOGY | Facility: CLINIC | Age: 40
End: 2025-05-05
Payer: COMMERCIAL

## 2025-05-05 VITALS — BODY MASS INDEX: 35.88 KG/M2 | HEIGHT: 62 IN | WEIGHT: 195 LBS

## 2025-05-05 DIAGNOSIS — C43.4 MALIGNANT MELANOMA OF SCALP (MULTI): Primary | ICD-10-CM

## 2025-05-05 ASSESSMENT — PATIENT HEALTH QUESTIONNAIRE - PHQ9
1. LITTLE INTEREST OR PLEASURE IN DOING THINGS: NOT AT ALL
SUM OF ALL RESPONSES TO PHQ9 QUESTIONS 1 AND 2: 0
2. FEELING DOWN, DEPRESSED OR HOPELESS: NOT AT ALL

## 2025-05-05 NOTE — PROGRESS NOTES
HEAD AND NECK SURGERY FOLLOW UP  [facility]    HPI  I had the pleasure of seeing Tasia Garcia for a follow up visit today.     Treatment History:  - 11/13/24 dermatology review biopsy left scalp, 0.4 mm depth with no ulceration, transected at deep margin, possible metastatic  - 12/5/24 s/p SLNBx with me, 1 lymph node negative for malignancy (decision made not to do WLE with me, planning for mohs)  - 12/17/24 PET scan with known scalp FDG avidity, ?breast avidity (follow up mammogram unremarkable)  - 1/22/25 s/p mohs left scalp melanoma, depth 5 mm, negative margins ([place])    She is doing well today. Since I last saw her she has started immunotherapy, planning q3 week cycles for one year. Following closely with her dermatologist q3 months. No new lumps or bumps on her head and neck. Healed well from surgery. Recent CT neck 4/1/25 without LAD. She recently vacationed in [place]    Physical Examination  General: Examination reveals a well-developed, well-nourished patient in no apparent distress.    Voice: The patient has no audible dysphonia  Respiratory: no stridor or airway distress.    Neuro: The patient is oriented, alert and responsive.    Face: symmetric movement, pre-auricular incision healed  Scalp: Mohs incision healed. No lesions surrounding it  Oral cavity: opens mouth widely, no trismus, no lesions, good tongue excursion  Salivary glands: no enlargement or masses, nontender  Neck: symmetric, full range of motion, no enlarged LAD    Data reviewed  Imaging: I personally reviewed the CT neck from 4/1/25, on my assessment no concerning enlarged cervical lymph nodes    Notes: I personally reviewed the notes from Collin Lobo NP (oncology) and Wes Veloz MD (oncology) detailing plan for enrollment in [facility] 2623 to treat stage 2b melanoma of the scalp    ASSESSMENT and PLAN:    Left scalp melanoma, eH1nB1R0 stage 2b, now s/p mohs excision with Dr Anne and SLNBx  (negative) with me. Started immunotherapy infusions  - Doing well overall, no clinical signs of disease on exam or on my review of CT scan  - Continue treatment with oncology  - Encouraged sun avoidance  - Continue close follow up with dermatology  - Follow up with me in about 6 months, certainly sooner if any issues    Madelin Russell MD

## 2025-05-16 DIAGNOSIS — C43.4 MALIGNANT MELANOMA OF SCALP (MULTI): ICD-10-CM

## 2025-05-19 ENCOUNTER — DOCUMENTATION (OUTPATIENT)
Dept: HEMATOLOGY/ONCOLOGY | Facility: HOSPITAL | Age: 40
End: 2025-05-19

## 2025-05-19 ENCOUNTER — INFUSION (OUTPATIENT)
Dept: HEMATOLOGY/ONCOLOGY | Facility: HOSPITAL | Age: 40
End: 2025-05-19
Payer: COMMERCIAL

## 2025-05-19 ENCOUNTER — OFFICE VISIT (OUTPATIENT)
Dept: HEMATOLOGY/ONCOLOGY | Facility: HOSPITAL | Age: 40
End: 2025-05-19
Payer: COMMERCIAL

## 2025-05-19 ENCOUNTER — LAB (OUTPATIENT)
Dept: LAB | Facility: HOSPITAL | Age: 40
End: 2025-05-19
Payer: COMMERCIAL

## 2025-05-19 VITALS
DIASTOLIC BLOOD PRESSURE: 63 MMHG | TEMPERATURE: 97.3 F | BODY MASS INDEX: 37.1 KG/M2 | RESPIRATION RATE: 16 BRPM | SYSTOLIC BLOOD PRESSURE: 115 MMHG | OXYGEN SATURATION: 100 % | HEART RATE: 71 BPM | WEIGHT: 199.6 LBS

## 2025-05-19 DIAGNOSIS — C43.4 MALIGNANT MELANOMA OF SCALP (MULTI): ICD-10-CM

## 2025-05-19 DIAGNOSIS — C43.4 MALIGNANT MELANOMA OF SCALP (MULTI): Primary | ICD-10-CM

## 2025-05-19 LAB
ALBUMIN SERPL BCP-MCNC: 4.5 G/DL (ref 3.4–5)
ALP SERPL-CCNC: 76 U/L (ref 33–110)
ALT SERPL W P-5'-P-CCNC: 14 U/L (ref 7–45)
ANION GAP SERPL CALC-SCNC: 12 MMOL/L (ref 10–20)
APPEARANCE UR: CLEAR
APTT PPP: 28 SECONDS (ref 26–36)
AST SERPL W P-5'-P-CCNC: 17 U/L (ref 9–39)
B-HCG SERPL-ACNC: <3 MIU/ML
BASOPHILS # BLD AUTO: 0.03 X10*3/UL (ref 0–0.1)
BASOPHILS NFR BLD AUTO: 0.5 %
BILIRUB SERPL-MCNC: 0.6 MG/DL (ref 0–1.2)
BILIRUB UR STRIP.AUTO-MCNC: NEGATIVE MG/DL
BUN SERPL-MCNC: 14 MG/DL (ref 6–23)
CALCIUM SERPL-MCNC: 9.4 MG/DL (ref 8.6–10.6)
CHLORIDE SERPL-SCNC: 104 MMOL/L (ref 98–107)
CK SERPL-CCNC: 81 U/L (ref 0–215)
CO2 SERPL-SCNC: 26 MMOL/L (ref 21–32)
COLOR UR: YELLOW
CORTIS SERPL-MCNC: 15.9 UG/DL (ref 2.5–20)
CREAT SERPL-MCNC: 0.67 MG/DL (ref 0.5–1.05)
CRP SERPL-MCNC: 0.32 MG/DL
EGFRCR SERPLBLD CKD-EPI 2021: >90 ML/MIN/1.73M*2
EOSINOPHIL # BLD AUTO: 0.07 X10*3/UL (ref 0–0.7)
EOSINOPHIL NFR BLD AUTO: 1.2 %
ERYTHROCYTE [DISTWIDTH] IN BLOOD BY AUTOMATED COUNT: 12.1 % (ref 11.5–14.5)
FERRITIN SERPL-MCNC: 22 NG/ML (ref 8–150)
GLUCOSE SERPL-MCNC: 78 MG/DL (ref 74–99)
GLUCOSE UR STRIP.AUTO-MCNC: NORMAL MG/DL
HCT VFR BLD AUTO: 39.8 % (ref 36–46)
HGB BLD-MCNC: 13.2 G/DL (ref 12–16)
IMM GRANULOCYTES # BLD AUTO: 0.02 X10*3/UL (ref 0–0.7)
IMM GRANULOCYTES NFR BLD AUTO: 0.4 % (ref 0–0.9)
INR PPP: 0.9 (ref 0.9–1.1)
KETONES UR STRIP.AUTO-MCNC: NEGATIVE MG/DL
LDH SERPL L TO P-CCNC: 131 U/L (ref 84–246)
LEUKOCYTE ESTERASE UR QL STRIP.AUTO: NEGATIVE
LYMPHOCYTES # BLD AUTO: 1.8 X10*3/UL (ref 1.2–4.8)
LYMPHOCYTES NFR BLD AUTO: 31.6 %
MAGNESIUM SERPL-MCNC: 2.25 MG/DL (ref 1.6–2.4)
MCH RBC QN AUTO: 29.6 PG (ref 26–34)
MCHC RBC AUTO-ENTMCNC: 33.2 G/DL (ref 32–36)
MCV RBC AUTO: 89 FL (ref 80–100)
MONOCYTES # BLD AUTO: 0.35 X10*3/UL (ref 0.1–1)
MONOCYTES NFR BLD AUTO: 6.2 %
MUCOUS THREADS #/AREA URNS AUTO: NORMAL /LPF
NEUTROPHILS # BLD AUTO: 3.42 X10*3/UL (ref 1.2–7.7)
NEUTROPHILS NFR BLD AUTO: 60.1 %
NITRITE UR QL STRIP.AUTO: NEGATIVE
NRBC BLD-RTO: 0 /100 WBCS (ref 0–0)
PH UR STRIP.AUTO: 5.5 [PH]
PLATELET # BLD AUTO: 256 X10*3/UL (ref 150–450)
POTASSIUM SERPL-SCNC: 4 MMOL/L (ref 3.5–5.3)
PROT SERPL-MCNC: 8 G/DL (ref 6.4–8.2)
PROT UR STRIP.AUTO-MCNC: NEGATIVE MG/DL
PROTHROMBIN TIME: 9.9 SECONDS (ref 9.8–12.4)
RBC # BLD AUTO: 4.46 X10*6/UL (ref 4–5.2)
RBC # UR STRIP.AUTO: ABNORMAL MG/DL
RBC #/AREA URNS AUTO: NORMAL /HPF
SODIUM SERPL-SCNC: 138 MMOL/L (ref 136–145)
SP GR UR STRIP.AUTO: 1.03
SQUAMOUS #/AREA URNS AUTO: NORMAL /HPF
T4 FREE SERPL-MCNC: 1.42 NG/DL (ref 0.78–1.48)
TSH SERPL-ACNC: 1.08 MIU/L (ref 0.44–3.98)
URATE SERPL-MCNC: 3.4 MG/DL (ref 2.3–6.7)
UROBILINOGEN UR STRIP.AUTO-MCNC: NORMAL MG/DL
WBC # BLD AUTO: 5.7 X10*3/UL (ref 4.4–11.3)
WBC #/AREA URNS AUTO: NORMAL /HPF

## 2025-05-19 PROCEDURE — 82550 ASSAY OF CK (CPK): CPT

## 2025-05-19 PROCEDURE — 84443 ASSAY THYROID STIM HORMONE: CPT

## 2025-05-19 PROCEDURE — 86140 C-REACTIVE PROTEIN: CPT

## 2025-05-19 PROCEDURE — 99215 OFFICE O/P EST HI 40 MIN: CPT | Mod: 25 | Performed by: NURSE PRACTITIONER

## 2025-05-19 PROCEDURE — 85610 PROTHROMBIN TIME: CPT

## 2025-05-19 PROCEDURE — 81001 URINALYSIS AUTO W/SCOPE: CPT

## 2025-05-19 PROCEDURE — 83615 LACTATE (LD) (LDH) ENZYME: CPT

## 2025-05-19 PROCEDURE — 85025 COMPLETE CBC W/AUTO DIFF WBC: CPT

## 2025-05-19 PROCEDURE — 2560000001 HC RX 256 EXPERIMENTAL DRUGS: Mod: SE | Performed by: NURSE PRACTITIONER

## 2025-05-19 PROCEDURE — 80053 COMPREHEN METABOLIC PANEL: CPT

## 2025-05-19 PROCEDURE — 82533 TOTAL CORTISOL: CPT

## 2025-05-19 PROCEDURE — 84702 CHORIONIC GONADOTROPIN TEST: CPT

## 2025-05-19 PROCEDURE — 83735 ASSAY OF MAGNESIUM: CPT

## 2025-05-19 PROCEDURE — 82024 ASSAY OF ACTH: CPT

## 2025-05-19 PROCEDURE — 36415 COLL VENOUS BLD VENIPUNCTURE: CPT

## 2025-05-19 PROCEDURE — C9399 UNCLASSIFIED DRUGS OR BIOLOG: HCPCS | Mod: SE | Performed by: NURSE PRACTITIONER

## 2025-05-19 PROCEDURE — 85730 THROMBOPLASTIN TIME PARTIAL: CPT

## 2025-05-19 PROCEDURE — 84439 ASSAY OF FREE THYROXINE: CPT

## 2025-05-19 PROCEDURE — 84550 ASSAY OF BLOOD/URIC ACID: CPT

## 2025-05-19 PROCEDURE — 96365 THER/PROPH/DIAG IV INF INIT: CPT | Mod: INF

## 2025-05-19 PROCEDURE — 82728 ASSAY OF FERRITIN: CPT

## 2025-05-19 RX ORDER — ALBUTEROL SULFATE 0.83 MG/ML
3 SOLUTION RESPIRATORY (INHALATION) AS NEEDED
Status: CANCELLED | OUTPATIENT
Start: 2025-05-19

## 2025-05-19 RX ORDER — DIPHENHYDRAMINE HYDROCHLORIDE 50 MG/ML
50 INJECTION, SOLUTION INTRAMUSCULAR; INTRAVENOUS AS NEEDED
Status: DISCONTINUED | OUTPATIENT
Start: 2025-05-19 | End: 2025-05-19 | Stop reason: HOSPADM

## 2025-05-19 RX ORDER — HEPARIN 100 UNIT/ML
500 SYRINGE INTRAVENOUS AS NEEDED
Status: DISCONTINUED | OUTPATIENT
Start: 2025-05-19 | End: 2025-05-19 | Stop reason: HOSPADM

## 2025-05-19 RX ORDER — EPINEPHRINE 0.3 MG/.3ML
0.3 INJECTION SUBCUTANEOUS EVERY 5 MIN PRN
Status: DISCONTINUED | OUTPATIENT
Start: 2025-05-19 | End: 2025-05-19 | Stop reason: HOSPADM

## 2025-05-19 RX ORDER — DIPHENHYDRAMINE HYDROCHLORIDE 50 MG/ML
50 INJECTION, SOLUTION INTRAMUSCULAR; INTRAVENOUS AS NEEDED
Status: CANCELLED | OUTPATIENT
Start: 2025-05-19

## 2025-05-19 RX ORDER — ALBUTEROL SULFATE 0.83 MG/ML
3 SOLUTION RESPIRATORY (INHALATION) AS NEEDED
Status: DISCONTINUED | OUTPATIENT
Start: 2025-05-19 | End: 2025-05-19 | Stop reason: HOSPADM

## 2025-05-19 RX ORDER — HEPARIN SODIUM,PORCINE/PF 10 UNIT/ML
50 SYRINGE (ML) INTRAVENOUS AS NEEDED
Status: DISCONTINUED | OUTPATIENT
Start: 2025-05-19 | End: 2025-05-19 | Stop reason: HOSPADM

## 2025-05-19 RX ORDER — PROCHLORPERAZINE EDISYLATE 5 MG/ML
10 INJECTION INTRAMUSCULAR; INTRAVENOUS EVERY 6 HOURS PRN
Status: CANCELLED | OUTPATIENT
Start: 2025-05-19

## 2025-05-19 RX ORDER — HEPARIN 100 UNIT/ML
500 SYRINGE INTRAVENOUS AS NEEDED
OUTPATIENT
Start: 2025-05-19

## 2025-05-19 RX ORDER — PROCHLORPERAZINE EDISYLATE 5 MG/ML
10 INJECTION INTRAMUSCULAR; INTRAVENOUS EVERY 6 HOURS PRN
Status: DISCONTINUED | OUTPATIENT
Start: 2025-05-19 | End: 2025-05-19 | Stop reason: HOSPADM

## 2025-05-19 RX ORDER — PROCHLORPERAZINE MALEATE 10 MG
10 TABLET ORAL EVERY 6 HOURS PRN
Status: DISCONTINUED | OUTPATIENT
Start: 2025-05-19 | End: 2025-05-19 | Stop reason: HOSPADM

## 2025-05-19 RX ORDER — EPINEPHRINE 0.3 MG/.3ML
0.3 INJECTION SUBCUTANEOUS EVERY 5 MIN PRN
Status: CANCELLED | OUTPATIENT
Start: 2025-05-19

## 2025-05-19 RX ORDER — PROCHLORPERAZINE MALEATE 10 MG
10 TABLET ORAL EVERY 6 HOURS PRN
Status: CANCELLED | OUTPATIENT
Start: 2025-05-19

## 2025-05-19 RX ORDER — FAMOTIDINE 10 MG/ML
20 INJECTION, SOLUTION INTRAVENOUS ONCE AS NEEDED
Status: DISCONTINUED | OUTPATIENT
Start: 2025-05-19 | End: 2025-05-19 | Stop reason: HOSPADM

## 2025-05-19 RX ORDER — FAMOTIDINE 10 MG/ML
20 INJECTION, SOLUTION INTRAVENOUS ONCE AS NEEDED
Status: CANCELLED | OUTPATIENT
Start: 2025-05-19

## 2025-05-19 RX ORDER — HEPARIN SODIUM,PORCINE/PF 10 UNIT/ML
50 SYRINGE (ML) INTRAVENOUS AS NEEDED
OUTPATIENT
Start: 2025-05-19

## 2025-05-19 RX ADMIN — Medication 1 DOSE: at 11:25

## 2025-05-19 ASSESSMENT — ENCOUNTER SYMPTOMS
NEUROLOGICAL NEGATIVE: 1
MUSCULOSKELETAL NEGATIVE: 1
CARDIOVASCULAR NEGATIVE: 1
CONSTITUTIONAL NEGATIVE: 1
RESPIRATORY NEGATIVE: 1
ENDOCRINE NEGATIVE: 1
PSYCHIATRIC NEGATIVE: 1
EYES NEGATIVE: 1
HEMATOLOGIC/LYMPHATIC NEGATIVE: 1
GASTROINTESTINAL NEGATIVE: 1

## 2025-05-19 ASSESSMENT — PAIN SCALES - GENERAL: PAINLEVEL_OUTOF10: 0-NO PAIN

## 2025-05-19 NOTE — PROGRESS NOTES
.Patient ID: Tasia Garcia is a 39 y.o. female.  Referring Physician: Wes Veloz MD  [id number] [place] [address]  Primary Care Provider: Arya Ku MD     Oncology follow up     Oncology History Overview Note   Ms. Tasia Garcia is diagnosed with stage IIb melanoma of the scalp.  She first presented to ENT oncology in November 2020 for and underwent wide local excision and sentinel lymph node biopsy on 12/5/2024 which showed a 3 mm deep cutaneous melanoma positive on the margins.  The sentinel lymph node was traced to the parotid lymph node which was negative for an deposit.  Since the margin was positive, the patient was referred for Mohs surgery which he completed on 1/22/2025 with Dr. Tevin Anne and a negative margin was achieved.  The final Breslow depth was labeled at 5 mm, no ulceration, no LVI-pT4a melanoma.  PET/CT scan on 1/6/2025 was negative for any distant metastatic deposits.  MRI brain on 2/13/2025 was negative for intracranial deposits. We spoke to the patient and she agreed to participate in REGE 2623     Malignant melanoma of scalp (Multi)   11/15/2024 Initial Diagnosis    Referring Surgeon: Enzo Russell and Tevin Anne   Dermatologist: Rosio Velazquez   Date of first meeting with our team: 02/12/2025    Date of First meeting with surgical team: 11/12/2024  Melanoma type: Cutaneous  Location of primary site: Scalp-primary dermal  Date of WLE and SLNB: Initial resection on 12/5/2024 (Dr. Madelin Russell) followed by Mohs surgery (Dr. Tevin Anne) to clear margins on 1/22/25.  Final pathology: Breslow Depth-5 mm; Ulceration status-negative; LVI-negative; Other high risk features-negative  [place] lymph node status: 1 node examined in the parotid gland-found to be negative for malignancy  Final Stage: pT4a N0 M0-stage IIb melanoma- AJCC 8th Ed.    BRAF status: Not known  Initial MRI brain with and without contrast: 2/13/2025-unremarkable  Initial Full  "body PET scan: 01/6/2025-unremarkable       1/1/2025 Cancer Staged    Staging form: Melanoma of the Skin, AJCC 8th Edition, Pathologic stage from 1/1/2025: Stage IIB (pT4a, pN0, cM0) - Signed by Wes Veloz MD on 2/13/2025 4/8/2025 -  Research Study Participant    ([facility]) [id number] Arms A, B, & C - Fianlimab / Cemiplimab or Pembrolizumab, 21 Day Cycles  Plan Provider: BEN Prado-CNP  Treatment goal: Curative  Line of treatment: Adjuvant  Associated studies: Fianlimab and Cemiplimab Versus Pembrolizumab in Resected High-risk Melanoma         HPI  Ms. Tasia Garcia is diagnosed with stage IIB melanoma- ([id number])- AJCC 8th Ed. She initially underwent wide local excision on 12/5/24 with Dr. Madelin Russell, but the margin was positive. She was then sent to Dr. Tevin Anne for Mohs surgery which was done on 1/22/25 and the margin was cleared. The sentinel nodes were negative. She met with medical oncology and decided to participate in the REGE 2623 trial.      Signed consent on [id number]. Double blind study with Fianlimab (Anti-LAG-3) and Cemiplimab Versus Pembrolizumab in the Adjuvant Setting.     C1: 04.08.25  C2: 04.29.25  C3: 05.19.25    Subjective   Please refer to \"Notes/Cancer History\" above for complete History of present illness.     Ms Tasia Blanco-Leeanne Garcia     - Presents to clinic alone.   - Overall, feels well.   - No new side effects or concerns from tx.   - Continues to follow closely with dermatology for skin checks.      Review of Systems:   Review of Systems   Constitutional: Negative.    HENT:  Negative.     Eyes: Negative.    Respiratory: Negative.     Cardiovascular: Negative.    Gastrointestinal: Negative.    Endocrine: Negative.    Genitourinary: Negative.     Musculoskeletal: Negative.    Skin: Negative.    Neurological: Negative.    Hematological: Negative.    Psychiatric/Behavioral: Negative.           MEDICAL HISTORY  Medical History[1]    FAMILY HISTORY  Family " History[2]    TOBACCO HISTORY  Tobacco Use: Low Risk  (5/5/2025)    Patient History     Smoking Tobacco Use: Never     Smokeless Tobacco Use: Never     Passive Exposure: Never       SOCIAL HISTORY  Social Connections: Moderately Integrated (2/12/2025)    Social Connection and Isolation Panel [NHANES]     Frequency of Communication with Friends and Family: More than three times a week     Frequency of Social Gatherings with Friends and Family: More than three times a week     Attends [employer] Services: More than 4 times per year     Active Member of Clubs or Organizations: Yes     Attends Club or Organization Meetings: More than 4 times per year     Marital Status:    Recent Concern: Social Connections - At Risk (1/25/2025)    Received from [employer]     Connectedness: 2        Outpatient Medication Profile:  Medications Ordered Prior to Encounter[3]      Performance Status:  Asymptomatic     Vitals and Measurements:   /63 (BP Location: Right arm, Patient Position: Sitting, BP Cuff Size: Large adult)   Pulse 71   Temp 36.3 °C (97.3 °F) (Skin)   Resp 16   Wt 90.5 kg (199 lb 9.6 oz)   SpO2 100%   BMI 37.10 kg/m²       Physical Exam:   Physical Exam  Constitutional:       Appearance: Normal appearance.   HENT:      Head: Normocephalic and atraumatic.      Comments: Well healed incision without nodules      Nose: Nose normal.      Mouth/Throat:      Mouth: Mucous membranes are moist.      Pharynx: Oropharynx is clear.   Eyes:      Conjunctiva/sclera: Conjunctivae normal.   Cardiovascular:      Rate and Rhythm: Normal rate and regular rhythm.      Pulses: Normal pulses.      Heart sounds: Normal heart sounds.   Pulmonary:      Effort: Pulmonary effort is normal.      Breath sounds: Normal breath sounds.   Abdominal:      General: Bowel sounds are normal.      Palpations: Abdomen is soft.   Musculoskeletal:         General: Normal range of motion.      Cervical back: Neck supple.    Skin:     General: Skin is warm and dry.   Neurological:      General: No focal deficit present.      Mental Status: She is alert and oriented to person, place, and time.   Psychiatric:         Mood and Affect: Mood normal.         Behavior: Behavior normal.        Lab Results:  I have reviewed these laboratory results:     Lab on 05/19/2025   Component Date Value Ref Range Status    WBC 05/19/2025 5.7  4.4 - 11.3 x10*3/uL Final    nRBC 05/19/2025 0.0  0.0 - 0.0 /100 WBCs Final    RBC 05/19/2025 4.46  4.00 - 5.20 x10*6/uL Final    Hemoglobin 05/19/2025 13.2  12.0 - 16.0 g/dL Final    Hematocrit 05/19/2025 39.8  36.0 - 46.0 % Final    MCV 05/19/2025 89  80 - 100 fL Final    MCH 05/19/2025 29.6  26.0 - 34.0 pg Final    MCHC 05/19/2025 33.2  32.0 - 36.0 g/dL Final    RDW 05/19/2025 12.1  11.5 - 14.5 % Final    Platelets 05/19/2025 256  150 - 450 x10*3/uL Final    Neutrophils % 05/19/2025 60.1  40.0 - 80.0 % Final    Immature Granulocytes %, Automated 05/19/2025 0.4  0.0 - 0.9 % Final    Immature Granulocyte Count (IG) includes promyelocytes, myelocytes and metamyelocytes but does not include bands. Percent differential counts (%) should be interpreted in the context of the absolute cell counts (cells/UL).    Lymphocytes % 05/19/2025 31.6  13.0 - 44.0 % Final    Monocytes % 05/19/2025 6.2  2.0 - 10.0 % Final    Eosinophils % 05/19/2025 1.2  0.0 - 6.0 % Final    Basophils % 05/19/2025 0.5  0.0 - 2.0 % Final    Neutrophils Absolute 05/19/2025 3.42  1.20 - 7.70 x10*3/uL Final    Percent differential counts (%) should be interpreted in the context of the absolute cell counts (cells/uL).    Immature Granulocytes Absolute, Au* 05/19/2025 0.02  0.00 - 0.70 x10*3/uL Final    Lymphocytes Absolute 05/19/2025 1.80  1.20 - 4.80 x10*3/uL Final    Monocytes Absolute 05/19/2025 0.35  0.10 - 1.00 x10*3/uL Final    Eosinophils Absolute 05/19/2025 0.07  0.00 - 0.70 x10*3/uL Final    Basophils Absolute 05/19/2025 0.03  0.00 - 0.10  x10*3/uL Final    Glucose 05/19/2025 78  74 - 99 mg/dL Final    Sodium 05/19/2025 138  136 - 145 mmol/L Final    Potassium 05/19/2025 4.0  3.5 - 5.3 mmol/L Final    Chloride 05/19/2025 104  98 - 107 mmol/L Final    Bicarbonate 05/19/2025 26  21 - 32 mmol/L Final    Anion Gap 05/19/2025 12  10 - 20 mmol/L Final    Urea Nitrogen 05/19/2025 14  6 - 23 mg/dL Final    Creatinine 05/19/2025 0.67  0.50 - 1.05 mg/dL Final    eGFR 05/19/2025 >90  >60 mL/min/1.73m*2 Final    Calculations of estimated GFR are performed using the 2021 CKD-EPI Study Refit equation without the race variable for the IDMS-Traceable creatinine methods.  https://jasn.asnjournals.org/content/early/2021/09/22/ASN.0219391198    Calcium 05/19/2025 9.4  8.6 - 10.6 mg/dL Final    Albumin 05/19/2025 4.5  3.4 - 5.0 g/dL Final    Alkaline Phosphatase 05/19/2025 76  33 - 110 U/L Final    Total Protein 05/19/2025 8.0  6.4 - 8.2 g/dL Final    AST 05/19/2025 17  9 - 39 U/L Final    Bilirubin, Total 05/19/2025 0.6  0.0 - 1.2 mg/dL Final    ALT 05/19/2025 14  7 - 45 U/L Final    Patients treated with Sulfasalazine may generate falsely decreased results for ALT.    HCG, Beta-Quantitative 05/19/2025 <3  <5 mIU/mL Final    aPTT 05/19/2025 28  26 - 36 seconds Final    Cortisol 05/19/2025 15.9  2.5 - 20.0 ug/dL Final    Creatine Kinase 05/19/2025 81  0 - 215 U/L Final    C-Reactive Protein 05/19/2025 0.32  <1.00 mg/dL Final    Ferritin 05/19/2025 22  8 - 150 ng/mL Final    LDH 05/19/2025 131  84 - 246 U/L Final    Magnesium 05/19/2025 2.25  1.60 - 2.40 mg/dL Final    Protime 05/19/2025 9.9  9.8 - 12.4 seconds Final    INR 05/19/2025 0.9  0.9 - 1.1 Final    Thyroid Stimulating Hormone 05/19/2025 1.08  0.44 - 3.98 mIU/L Final    Thyroxine, Free 05/19/2025 1.42  0.78 - 1.48 ng/dL Final    Uric Acid 05/19/2025 3.4  2.3 - 6.7 mg/dL Final    Venipuncture immediately after or during the administration of Metamizole may lead to falsely low results. Testing should be performed  immediately  prior to Metamizole dosing.        Radiology Result:  I have reviewed the latest Imaging in PACS and the findings are noted in this note. I discussed the results of the latest imaging with the patient. All previous imaging were reviewed at the time it was completed. Full records are available in the EMR for review as well.     MR BRAIN W AND WO IV CONTRAST; 4/1/2025 1:25 pm     IMPRESSION:  1. No definitive evidence of brain metastasis.  2. 6 mm lesion in the right frontal scalp is entirely nonspecific and  correlation with physical exam findings is recommended. It is similar  to slightly increased in size from 02/13/2025.  =================================================  CT CHEST ABDOMEN PELVIS W IV CONTRAST; 4/1/2025 11:55 am     IMPRESSION:  Melanoma restaging scan compared to PET-CT 01/06/2025:  1. No evidence of disease.  2. Additional incidental non-acute findings as detailed above.     Pathology Results:  I have reviewed the full pathology report recorded in the EMR. The pertinent portions indicating diagnosis are listed here in the note. for details please refer to the full report recorded in the EMR.    Dermatopathology- DERM LAB: [id number]  Order: [id number]   Collected 1/22/2025 11:41    Status: Edited Result - FINAL    Dx: Malignant melanoma of scalp (Multi)    Test Result Released: Yes (seen)    0 Result Notes      Component    FINAL DIAGNOSIS   SKIN, LEFT CENTRAL FRONTAL SCALP, EXCISION:  MALIGNANT MELANOMA, BRESLOW THICKNESS AT LEAST 3 MM, PRESENT ON THE DEEP MARGIN, SEE NOTE.     Note: Microscopic examination reveals a specimen that extends into the subcutaneous fat. In the superficial and deep dermis there are nests and fascicles of atypical epithelioid and spindled cells that focally appear to involve the deep margin. There appears to be processing artifact from previous freezing which makes the interpretation of the mitotic rate challenging.      Electronically signed by Bouchra RENTERIA  MD Jamie on 1/27/2025 at 1111        BRAF Exon 15: [id number]  Order: [id number] - Reflex for Order [id number]   Collected 1/22/2025 11:41    Status: Final result    Dx: Malignant melanoma of scalp (Multi)    Test Result Released: Yes (seen)    0 Result Notes      Component    BRAF Exon 15 Results       RESULTS: Not Detected.     INTERPRETATION:  No variants are detected in the listed gene regions.  This finding does not exclude the presence of genetic alterations present in gene regions not tested or occurring at a frequency below the limit of detection.     DISEASE RELEVANT ALTERATIONS NOT DETECTED:   Negative for BRAF V600.           Assessment and Plan:   Assessment/Plan   Ms. Tasia Garcia is a 39 y.o. female with a diagnosis of stage IIb melanoma of the scalp. Please see the evolution of the case listed above in the cancer history.      # Stage 2B melanoma of scalp  - Proceed with C3D1 treatment on [id number].   - Continue skin checks with dermatology.      DISCLAIMER:   In preparing for this visit and writing this note, I reviewed all the previous electronic medical records (labs, imaging and medical charts) of the patient available in the physician portal. Significant findings which helped in decision making are recorded  in this chart.     The plan was discussed with the patient. We gave her ample opportunities to ask questions. All questions were answered to her satisfaction and she verbalized understanding.      Tasia Hernandez, CNP   Sarcoma and Cutaneous Oncology  [facility]    Phone: [phone]  Fax: [phone]       [1]   Past Medical History:  Diagnosis Date    Dental disease 07/2024    Melanoma of scalp (Multi)     Personal history of other diseases of the female genital tract     History of infertility   [2]   Family History  Problem Relation Name Age of Onset    Other (cervical carcinoma) Mother Kimberly michaels     Cancer Mother Kimberly michaels      Stroke Mother Kimberly fallon     Ovarian cancer Mother Kimberly fallon 45    Cerebral palsy Sister      Breast cancer Paternal Grandmother Laura Fallon         AGE UNKNOWN   [3]   Current Outpatient Medications on File Prior to Visit   Medication Sig Dispense Refill    norgestrel (Opill) 0.075 mg tablet Take 1 tablet by mouth once daily. This is not used for an Emergency Contraceptive but taken daily 28 each 0    prenatal vit,calc76-iron-folic (Prenatabs Rx) 29 mg iron- 1 mg tablet Take 1 tablet by mouth once daily.       No current facility-administered medications on file prior to visit.

## 2025-05-19 NOTE — PROGRESS NOTES
Tasia Garcia is a 39 y.o. female who presents in stable condition for C3D1 infusion after seeing her provider this morning    She is on the following chemotherapy regimen:     Treatment Plans       Name Type Plan Dates Plan Provider         Active    ([facility]) [id number] Arms A, B, & C - Fianlimab / Cemiplimab or Pembrolizumab, 21 Day Cycles Oncology Treatment 4/7/2025 - Present BEN Prado-CNP             Since her last visit, she has been doing well.  Overall, she states her energy level is stable.  Her appetite & hydration status has been good.  She reports no complaints.  She has no other concerns.    Patient tolerated infusion well and has been educated with the overall therapy plan. Questions & concerns addressed by her provider during visit.   AVS, lab results & future appointment provided. Patient discharged in stable condition.    Reviewed and approved by PROSPER JONES on 5/19/25 at 10:59 AM.

## 2025-05-19 NOTE — PROGRESS NOTES
Research Note Treatment Day    Tasia Garcia is here today for treatment on [id number]. Today is C3D1. Procedures completed per protocol. AE's and con-meds reviewed with patient. Patient is aware of treatment plan. Pt states she is feeling very well, no complaints, no changes in medications.  Pt examined by RUBEN PATEL, labs reviewed.  Pt cleared for treatment.  Pt tolerated well, dc'd ambulatory.    [x]   Received treatment as planned   OR  []    Treatment delayed; patient calendar updated as required   Treatment delayed because:    []   AE    []   Physician Discretion    []   Clinical Deterioration or Progression     []   Other    Education Documentation  No documentation found.  Education Comments  No comments found.            ----- Message from Chi Jerome MD sent at 7/31/2023  8:14 AM CDT -----  Please let them know that chest x-ray does not show any acute process.  Please keep taking inhalers as prescribed.  Also due to set up CT scan and pulmonology follow-up in 5 months (see TE 6/28/23). Thanks!

## 2025-05-21 LAB — ACTH PLAS-MCNC: 12.5 PG/ML (ref 7.2–63.3)

## 2025-06-10 ENCOUNTER — APPOINTMENT (OUTPATIENT)
Dept: HEMATOLOGY/ONCOLOGY | Facility: HOSPITAL | Age: 40
End: 2025-06-10
Payer: COMMERCIAL

## 2025-06-13 ENCOUNTER — OFFICE VISIT (OUTPATIENT)
Dept: HEMATOLOGY/ONCOLOGY | Facility: HOSPITAL | Age: 40
End: 2025-06-13
Payer: COMMERCIAL

## 2025-06-13 ENCOUNTER — DOCUMENTATION (OUTPATIENT)
Dept: HEMATOLOGY/ONCOLOGY | Facility: HOSPITAL | Age: 40
End: 2025-06-13
Payer: COMMERCIAL

## 2025-06-13 ENCOUNTER — INFUSION (OUTPATIENT)
Dept: HEMATOLOGY/ONCOLOGY | Facility: HOSPITAL | Age: 40
End: 2025-06-13
Payer: COMMERCIAL

## 2025-06-13 ENCOUNTER — LAB (OUTPATIENT)
Dept: LAB | Facility: HOSPITAL | Age: 40
End: 2025-06-13
Payer: COMMERCIAL

## 2025-06-13 VITALS
SYSTOLIC BLOOD PRESSURE: 122 MMHG | TEMPERATURE: 97.5 F | OXYGEN SATURATION: 100 % | WEIGHT: 200.62 LBS | BODY MASS INDEX: 37.29 KG/M2 | DIASTOLIC BLOOD PRESSURE: 68 MMHG | RESPIRATION RATE: 18 BRPM | HEART RATE: 73 BPM

## 2025-06-13 DIAGNOSIS — C43.4 MALIGNANT MELANOMA OF SCALP (MULTI): ICD-10-CM

## 2025-06-13 DIAGNOSIS — C43.4 MALIGNANT MELANOMA OF SCALP (MULTI): Primary | ICD-10-CM

## 2025-06-13 LAB
ALBUMIN SERPL BCP-MCNC: 4.2 G/DL (ref 3.4–5)
ALP SERPL-CCNC: 71 U/L (ref 33–110)
ALT SERPL W P-5'-P-CCNC: 18 U/L (ref 7–45)
ANION GAP SERPL CALC-SCNC: 12 MMOL/L (ref 10–20)
APPEARANCE UR: CLEAR
APTT PPP: 26 SECONDS (ref 26–36)
AST SERPL W P-5'-P-CCNC: 19 U/L (ref 9–39)
B-HCG SERPL-ACNC: <3 MIU/ML
BASOPHILS # BLD AUTO: 0.03 X10*3/UL (ref 0–0.1)
BASOPHILS NFR BLD AUTO: 0.4 %
BILIRUB SERPL-MCNC: 0.9 MG/DL (ref 0–1.2)
BILIRUB UR STRIP.AUTO-MCNC: NEGATIVE MG/DL
BUN SERPL-MCNC: 10 MG/DL (ref 6–23)
CALCIUM SERPL-MCNC: 9.1 MG/DL (ref 8.6–10.3)
CHLORIDE SERPL-SCNC: 104 MMOL/L (ref 98–107)
CK SERPL-CCNC: 90 U/L (ref 0–215)
CO2 SERPL-SCNC: 26 MMOL/L (ref 21–32)
COLOR UR: YELLOW
CREAT SERPL-MCNC: 0.73 MG/DL (ref 0.5–1.05)
CRP SERPL-MCNC: 0.27 MG/DL
EGFRCR SERPLBLD CKD-EPI 2021: >90 ML/MIN/1.73M*2
EOSINOPHIL # BLD AUTO: 0.07 X10*3/UL (ref 0–0.7)
EOSINOPHIL NFR BLD AUTO: 1 %
ERYTHROCYTE [DISTWIDTH] IN BLOOD BY AUTOMATED COUNT: 12.4 % (ref 11.5–14.5)
FERRITIN SERPL-MCNC: 15 NG/ML (ref 8–150)
GLUCOSE SERPL-MCNC: 93 MG/DL (ref 74–99)
GLUCOSE UR STRIP.AUTO-MCNC: NORMAL MG/DL
HCT VFR BLD AUTO: 40.4 % (ref 36–46)
HGB BLD-MCNC: 13.5 G/DL (ref 12–16)
IMM GRANULOCYTES # BLD AUTO: 0.02 X10*3/UL (ref 0–0.7)
IMM GRANULOCYTES NFR BLD AUTO: 0.3 % (ref 0–0.9)
INR PPP: 0.9 (ref 0.9–1.1)
KETONES UR STRIP.AUTO-MCNC: NEGATIVE MG/DL
LDH SERPL L TO P-CCNC: 133 U/L (ref 84–246)
LEUKOCYTE ESTERASE UR QL STRIP.AUTO: NEGATIVE
LYMPHOCYTES # BLD AUTO: 1.54 X10*3/UL (ref 1.2–4.8)
LYMPHOCYTES NFR BLD AUTO: 21.5 %
MAGNESIUM SERPL-MCNC: 2.23 MG/DL (ref 1.6–2.4)
MCH RBC QN AUTO: 29.7 PG (ref 26–34)
MCHC RBC AUTO-ENTMCNC: 33.4 G/DL (ref 32–36)
MCV RBC AUTO: 89 FL (ref 80–100)
MONOCYTES # BLD AUTO: 0.46 X10*3/UL (ref 0.1–1)
MONOCYTES NFR BLD AUTO: 6.4 %
MUCOUS THREADS #/AREA URNS AUTO: NORMAL /LPF
NEUTROPHILS # BLD AUTO: 5.03 X10*3/UL (ref 1.2–7.7)
NEUTROPHILS NFR BLD AUTO: 70.4 %
NITRITE UR QL STRIP.AUTO: NEGATIVE
NRBC BLD-RTO: 0 /100 WBCS (ref 0–0)
PH UR STRIP.AUTO: 6 [PH]
PLATELET # BLD AUTO: 264 X10*3/UL (ref 150–450)
POTASSIUM SERPL-SCNC: 4.1 MMOL/L (ref 3.5–5.3)
PROT SERPL-MCNC: 7.2 G/DL (ref 6.4–8.2)
PROT UR STRIP.AUTO-MCNC: NEGATIVE MG/DL
PROTHROMBIN TIME: 10.3 SECONDS (ref 9.8–12.4)
RBC # BLD AUTO: 4.55 X10*6/UL (ref 4–5.2)
RBC # UR STRIP.AUTO: ABNORMAL MG/DL
RBC #/AREA URNS AUTO: NORMAL /HPF
SODIUM SERPL-SCNC: 138 MMOL/L (ref 136–145)
SP GR UR STRIP.AUTO: 1.02
SQUAMOUS #/AREA URNS AUTO: NORMAL /HPF
URATE SERPL-MCNC: 3.3 MG/DL (ref 2.3–6.7)
UROBILINOGEN UR STRIP.AUTO-MCNC: NORMAL MG/DL
WBC # BLD AUTO: 7.2 X10*3/UL (ref 4.4–11.3)
WBC #/AREA URNS AUTO: NORMAL /HPF

## 2025-06-13 PROCEDURE — 85730 THROMBOPLASTIN TIME PARTIAL: CPT

## 2025-06-13 PROCEDURE — 82728 ASSAY OF FERRITIN: CPT

## 2025-06-13 PROCEDURE — 84702 CHORIONIC GONADOTROPIN TEST: CPT

## 2025-06-13 PROCEDURE — 84550 ASSAY OF BLOOD/URIC ACID: CPT

## 2025-06-13 PROCEDURE — 80053 COMPREHEN METABOLIC PANEL: CPT

## 2025-06-13 PROCEDURE — 83735 ASSAY OF MAGNESIUM: CPT

## 2025-06-13 PROCEDURE — 99214 OFFICE O/P EST MOD 30 MIN: CPT

## 2025-06-13 PROCEDURE — C9399 UNCLASSIFIED DRUGS OR BIOLOG: HCPCS | Mod: SE

## 2025-06-13 PROCEDURE — 2560000001 HC RX 256 EXPERIMENTAL DRUGS: Mod: SE

## 2025-06-13 PROCEDURE — 85610 PROTHROMBIN TIME: CPT

## 2025-06-13 PROCEDURE — 85025 COMPLETE CBC W/AUTO DIFF WBC: CPT

## 2025-06-13 PROCEDURE — 96413 CHEMO IV INFUSION 1 HR: CPT

## 2025-06-13 PROCEDURE — 83615 LACTATE (LD) (LDH) ENZYME: CPT

## 2025-06-13 PROCEDURE — 36415 COLL VENOUS BLD VENIPUNCTURE: CPT

## 2025-06-13 PROCEDURE — 82550 ASSAY OF CK (CPK): CPT

## 2025-06-13 PROCEDURE — 81001 URINALYSIS AUTO W/SCOPE: CPT

## 2025-06-13 PROCEDURE — 86140 C-REACTIVE PROTEIN: CPT

## 2025-06-13 RX ORDER — EPINEPHRINE 0.3 MG/.3ML
0.3 INJECTION SUBCUTANEOUS EVERY 5 MIN PRN
Status: CANCELLED | OUTPATIENT
Start: 2025-06-13

## 2025-06-13 RX ORDER — DIPHENHYDRAMINE HYDROCHLORIDE 50 MG/ML
50 INJECTION, SOLUTION INTRAMUSCULAR; INTRAVENOUS AS NEEDED
Status: CANCELLED | OUTPATIENT
Start: 2025-06-13

## 2025-06-13 RX ORDER — PROCHLORPERAZINE MALEATE 10 MG
10 TABLET ORAL EVERY 6 HOURS PRN
Status: CANCELLED | OUTPATIENT
Start: 2025-06-13

## 2025-06-13 RX ORDER — ALBUTEROL SULFATE 0.83 MG/ML
3 SOLUTION RESPIRATORY (INHALATION) AS NEEDED
Status: CANCELLED | OUTPATIENT
Start: 2025-06-13

## 2025-06-13 RX ORDER — FAMOTIDINE 10 MG/ML
20 INJECTION, SOLUTION INTRAVENOUS ONCE AS NEEDED
Status: CANCELLED | OUTPATIENT
Start: 2025-06-13

## 2025-06-13 RX ORDER — PROCHLORPERAZINE EDISYLATE 5 MG/ML
10 INJECTION INTRAMUSCULAR; INTRAVENOUS EVERY 6 HOURS PRN
Status: CANCELLED | OUTPATIENT
Start: 2025-06-13

## 2025-06-13 RX ADMIN — Medication 1 DOSE: at 13:11

## 2025-06-13 ASSESSMENT — ENCOUNTER SYMPTOMS
BACK PAIN: 0
EXTREMITY WEAKNESS: 0
HEMATURIA: 0
CHILLS: 0
CONSTIPATION: 0
PALPITATIONS: 0
ABDOMINAL PAIN: 0
FLANK PAIN: 0
EYE PROBLEMS: 0
CONFUSION: 0
HEMOPTYSIS: 0
NERVOUS/ANXIOUS: 0
LEG SWELLING: 0
SEIZURES: 0
SORE THROAT: 0
DIARRHEA: 0
VOMITING: 0
MYALGIAS: 0
DIFFICULTY URINATING: 0
FATIGUE: 0
ADENOPATHY: 0
DEPRESSION: 0
TROUBLE SWALLOWING: 0
COUGH: 0
HOT FLASHES: 0
SHORTNESS OF BREATH: 0
FEVER: 0
SCLERAL ICTERUS: 0
CHEST TIGHTNESS: 0
DIZZINESS: 0
APPETITE CHANGE: 0
NAUSEA: 0
DYSURIA: 0
FREQUENCY: 0

## 2025-06-13 ASSESSMENT — PAIN SCALES - GENERAL: PAINLEVEL_OUTOF10: 0-NO PAIN

## 2025-06-13 NOTE — PROGRESS NOTES
.Patient ID: Tasia Garcia is a 39 y.o. female.  Referring Physician: Wes Veloz MD  [address] [place] [address]  Primary Care Provider: Arya Ku MD     Chief Complaint   Patient presents with   • Follow-up      Oncology History Overview Note   Ms. Tasia Garcia is diagnosed with stage IIb melanoma of the scalp.  She first presented to ENT oncology in November 2020 for and underwent wide local excision and sentinel lymph node biopsy on 12/5/2024 which showed a 3 mm deep cutaneous melanoma positive on the margins.  The sentinel lymph node was traced to the parotid lymph node which was negative for an deposit.  Since the margin was positive, the patient was referred for Mohs surgery which he completed on 1/22/2025 with Dr. Tevin Anne and a negative margin was achieved.  The final Breslow depth was labeled at 5 mm, no ulceration, no LVI-pT4a melanoma.  PET/CT scan on 1/6/2025 was negative for any distant metastatic deposits.  MRI brain on 2/13/2025 was negative for intracranial deposits. We spoke to the patient and she agreed to participate in REGE 2623     Malignant melanoma of scalp (Multi)   11/15/2024 Initial Diagnosis    Referring Surgeon: Enzo Russell and Tevin Anne   Dermatologist: Rosio Velazquez   Date of first meeting with our team: 02/12/2025    Date of First meeting with surgical team: 11/12/2024  Melanoma type: Cutaneous  Location of primary site: Scalp-primary dermal  Date of WLE and SLNB: Initial resection on 12/5/2024 (Dr. Madelin Russell) followed by Mohs surgery (Dr. Tevin Anne) to clear margins on 1/22/25.  Final pathology: Breslow Depth-5 mm; Ulceration status-negative; LVI-negative; Other high risk features-negative  [place]ty lymph node status: 1 node examined in the parotid gland-found to be negative for malignancy  Final Stage: pT4a N0 M0-stage IIb melanoma- AJCC 8th Ed.    BRAF status: Not known  Initial MRI brain with and without contrast:  "2/13/2025-unremarkable  Initial Full body PET scan: 01/6/2025-unremarkable       1/1/2025 Cancer Staged    Staging form: Melanoma of the Skin, AJCC 8th Edition, Pathologic stage from 1/1/2025: Stage IIB (pT4a, pN0, cM0) - Signed by Wes Veloz MD on 2/13/2025 4/8/2025 -  Research Study Participant    ([facility]) [id number] Arms A, B, & C - Fianlimab / Cemiplimab or Pembrolizumab, 21 Day Cycles  Plan Provider: BEN Prado-CNP  Treatment goal: Curative  Line of treatment: Adjuvant  Associated studies: Fianlimab and Cemiplimab Versus Pembrolizumab in Resected High-risk Melanoma        Ms. Tasia Garcia is diagnosed with stage IIB melanoma- ([id number])- AJCC 8th Ed. She initially underwent wide local excision on 12/5/24 with Dr. Madelin Russell, but the margin was positive. She was then sent to Dr. Tevin Anne for Mohs surgery which was done on 1/22/25 and the margin was cleared. The sentinel nodes were negative. She met with medical oncology and decided to participate in the REGE 2623 trial.     Signed consent on [id number]. Double blind study with Fianlimab (Anti-LAG-3) and Cemiplimab Versus Pembrolizumab in the Adjuvant Setting.    C1: 04.08.25  C2: 04.29.25  C3: 05.19.25  C4: 06.13.25    Subjective   Please refer to \"Notes/Cancer History\" above for complete History of present illness.     Today 6/13/2025  Patient in clinic for C4D1 on [id number].  Reports she tolerated the treatment well and denies any side effects from the treatment. She reports she continues to be active. Patient denies any pain, dizziness, lightheadedness, headaches, rash/itching, chills or fever, SOB, cough, sputum, chest pain or chest tightness, painful inflammation/ulceration oral mucous membranes, nausea/vomiting, diarrhea/constipation, hematemesis /hemoptysis, hematuria/rectal bleeding, urinary symptoms, swelling extremities, weakness/fatigue, or peripheral neuropathy. ROS as above. Remainder of 10 point review of systems elicited " and otherwise unremarkable.     Review of Systems:   Review of Systems   Constitutional:  Negative for appetite change, chills, fatigue and fever.   HENT:   Negative for hearing loss, lump/mass, mouth sores, sore throat and trouble swallowing.    Eyes:  Negative for eye problems and icterus.   Respiratory:  Negative for chest tightness, cough, hemoptysis and shortness of breath.    Cardiovascular:  Negative for chest pain, leg swelling and palpitations.   Gastrointestinal:  Negative for abdominal pain, constipation, diarrhea, nausea and vomiting.   Endocrine: Negative for hot flashes.   Genitourinary:  Negative for difficulty urinating, dysuria, frequency and hematuria.    Musculoskeletal:  Negative for back pain, flank pain, gait problem and myalgias.   Skin:  Negative for itching and rash.   Neurological:  Negative for dizziness, extremity weakness, gait problem and seizures.   Hematological:  Negative for adenopathy.   Psychiatric/Behavioral:  Negative for confusion and depression. The patient is not nervous/anxious.          MEDICAL HISTORY  Past Medical History:   Diagnosis Date   • Dental disease 07/2024   • Melanoma of scalp (Multi)    • Personal history of other diseases of the female genital tract     History of infertility       FAMILY HISTORY  Family History   Problem Relation Name Age of Onset   • Other (cervical carcinoma) Mother Kimberly fallon    • Cancer Mother Kimberly fallon    • Stroke Mother Kimberly fallon    • Ovarian cancer Mother Kimberly fallon 45   • Cerebral palsy Sister     • Breast cancer Paternal Grandmother Laura Fallon         AGE UNKNOWN       TOBACCO HISTORY  Tobacco Use: Low Risk  (6/13/2025)    Patient History    • Smoking Tobacco Use: Never    • Smokeless Tobacco Use: Never    • Passive Exposure: Never       SOCIAL HISTORY  Social Connections: Moderately Integrated (2/12/2025)    Social Connection and Isolation Panel [NHANES]    • Frequency of Communication with Friends and Family: More  "than three times a week    • Frequency of Social Gatherings with Friends and Family: More than three times a week    • Attends Gnosticism Services: More than 4 times per year    • Active Member of Clubs or Organizations: Yes    • Attends Club or Organization Meetings: More than 4 times per year    • Marital Status:    Recent Concern: Social Connections - At Risk (1/25/2025)    Received from [employer]    • Connectedness: 2        Outpatient Medication Profile:  Current Outpatient Medications on File Prior to Visit   Medication Sig Dispense Refill   • norgestrel (Opill) 0.075 mg tablet Take 1 tablet by mouth once daily. This is not used for an Emergency Contraceptive but taken daily 28 each 0   • prenatal vit,calc76-iron-folic (Prenatabs Rx) 29 mg iron- 1 mg tablet Take 1 tablet by mouth once daily.       No current facility-administered medications on file prior to visit.      Performance Status:  ECOG 0 : Fully active, able to carry on all pre-disease performance without restriction      Vitals and Measurements:   /68 (BP Location: Right arm, Patient Position: Sitting, BP Cuff Size: Adult)   Pulse 73   Temp 36.4 °C (97.5 °F) (Temporal)   Resp 18   Wt 91 kg (200 lb 9.9 oz)   SpO2 100%   BMI 37.29 kg/m²      Daily Weight  06/13/25 : 91 kg (200 lb 9.9 oz)  05/19/25 : 90.5 kg (199 lb 9.6 oz)  05/05/25 : 88.5 kg (195 lb)  04/29/25 : 89.6 kg (197 lb 8 oz)  04/08/25 : 90.3 kg (199 lb)         4/1/2025     9:51 AM 4/8/2025    12:58 PM 4/8/2025     1:34 PM 4/29/2025     8:23 AM 5/5/2025    10:45 AM 5/19/2025     9:09 AM 6/13/2025    11:03 AM   Vitals   Systolic 124 132 124 118  115 122   Diastolic 67 62 65 63  63 68   BP Location Right arm Left arm Right arm Right arm  Right arm Right arm   Heart Rate 72 77 74 64  71 73   Temp 36.1 °C (97 °F) 36.3 °C (97.3 °F) 36 °C (96.8 °F) 35.2 °C (95.4 °F)  36.3 °C (97.3 °F) 36.4 °C (97.5 °F)   Resp 16 20 18 16  16 18   Height     1.562 m (5' 1.5\")   "   Weight (lb) 196 199.74 199 197.5 195 199.6 200.62   BMI 36.96 kg/m2 37.66 kg/m2 37.52 kg/m2 37.24 kg/m2 36.25 kg/m2 37.1 kg/m2 37.29 kg/m2   BSA (m2) 1.96 m2 1.98 m2 1.97 m2 1.96 m2 1.96 m2 1.98 m2 1.99 m2   Visit Report Report Report Report Report Report Report Report          Physical Exam:   Physical Exam  Vitals reviewed.   Constitutional:       Appearance: Normal appearance.   HENT:      Head: Normocephalic.      Mouth/Throat:      Mouth: Mucous membranes are moist.      Tongue: No lesions.      Palate: No mass.      Pharynx: Oropharynx is clear.   Eyes:      Extraocular Movements: Extraocular movements intact.      Conjunctiva/sclera: Conjunctivae normal.      Pupils: Pupils are equal, round, and reactive to light.   Cardiovascular:      Rate and Rhythm: Normal rate and regular rhythm.      Heart sounds: Normal heart sounds, S1 normal and S2 normal.   Pulmonary:      Effort: Pulmonary effort is normal. No respiratory distress.      Breath sounds: Normal breath sounds.   Abdominal:      General: Bowel sounds are normal.      Palpations: Abdomen is soft.   Musculoskeletal:         General: Normal range of motion.      Cervical back: Full passive range of motion without pain, normal range of motion and neck supple.   Lymphadenopathy:      Cervical: No cervical adenopathy.   Skin:     General: Skin is warm and dry.      Capillary Refill: Capillary refill takes less than 2 seconds.      Comments: Scan lesion well healed. New benign lesion removed from the back by dermatology.   Neurological:      General: No focal deficit present.      Cranial Nerves: Cranial nerves 2-12 are intact.   Psychiatric:         Attention and Perception: Attention normal.         Mood and Affect: Mood normal.         Behavior: Behavior is cooperative.         Cognition and Memory: Cognition normal.         Judgment: Judgment normal.       Lab Results:  I have reviewed these laboratory results:     Lab on 06/13/2025   Component Date  Value Ref Range Status   • WBC 06/13/2025 7.2  4.4 - 11.3 x10*3/uL Final   • nRBC 06/13/2025 0.0  0.0 - 0.0 /100 WBCs Final   • RBC 06/13/2025 4.55  4.00 - 5.20 x10*6/uL Final   • Hemoglobin 06/13/2025 13.5  12.0 - 16.0 g/dL Final   • Hematocrit 06/13/2025 40.4  36.0 - 46.0 % Final   • MCV 06/13/2025 89  80 - 100 fL Final   • MCH 06/13/2025 29.7  26.0 - 34.0 pg Final   • MCHC 06/13/2025 33.4  32.0 - 36.0 g/dL Final   • RDW 06/13/2025 12.4  11.5 - 14.5 % Final   • Platelets 06/13/2025 264  150 - 450 x10*3/uL Final   • Neutrophils % 06/13/2025 70.4  40.0 - 80.0 % Final   • Immature Granulocytes %, Automated 06/13/2025 0.3  0.0 - 0.9 % Final   • Lymphocytes % 06/13/2025 21.5  13.0 - 44.0 % Final   • Monocytes % 06/13/2025 6.4  2.0 - 10.0 % Final   • Eosinophils % 06/13/2025 1.0  0.0 - 6.0 % Final   • Basophils % 06/13/2025 0.4  0.0 - 2.0 % Final   • Neutrophils Absolute 06/13/2025 5.03  1.20 - 7.70 x10*3/uL Final   • Immature Granulocytes Absolute, Au* 06/13/2025 0.02  0.00 - 0.70 x10*3/uL Final   • Lymphocytes Absolute 06/13/2025 1.54  1.20 - 4.80 x10*3/uL Final   • Monocytes Absolute 06/13/2025 0.46  0.10 - 1.00 x10*3/uL Final   • Eosinophils Absolute 06/13/2025 0.07  0.00 - 0.70 x10*3/uL Final   • Basophils Absolute 06/13/2025 0.03  0.00 - 0.10 x10*3/uL Final   • Glucose 06/13/2025 93  74 - 99 mg/dL Final   • Sodium 06/13/2025 138  136 - 145 mmol/L Final   • Potassium 06/13/2025 4.1  3.5 - 5.3 mmol/L Final   • Chloride 06/13/2025 104  98 - 107 mmol/L Final   • Bicarbonate 06/13/2025 26  21 - 32 mmol/L Final   • Anion Gap 06/13/2025 12  10 - 20 mmol/L Final   • Urea Nitrogen 06/13/2025 10  6 - 23 mg/dL Final   • Creatinine 06/13/2025 0.73  0.50 - 1.05 mg/dL Final   • eGFR 06/13/2025 >90  >60 mL/min/1.73m*2 Final   • Calcium 06/13/2025 9.1  8.6 - 10.3 mg/dL Final   • Albumin 06/13/2025 4.2  3.4 - 5.0 g/dL Final   • Alkaline Phosphatase 06/13/2025 71  33 - 110 U/L Final   • Total Protein 06/13/2025 7.2  6.4 - 8.2 g/dL  Final   • AST 06/13/2025 19  9 - 39 U/L Final   • Bilirubin, Total 06/13/2025 0.9  0.0 - 1.2 mg/dL Final   • ALT 06/13/2025 18  7 - 45 U/L Final   • HCG, Beta-Quantitative 06/13/2025 <3  <5 mIU/mL Final   • aPTT 06/13/2025 26  26 - 36 seconds Final   • Creatine Kinase 06/13/2025 90  0 - 215 U/L Final   • C-Reactive Protein 06/13/2025 0.27  <1.00 mg/dL Final   • Ferritin 06/13/2025 15  8 - 150 ng/mL Final   • LDH 06/13/2025 133  84 - 246 U/L Final   • Magnesium 06/13/2025 2.23  1.60 - 2.40 mg/dL Final   • Protime 06/13/2025 10.3  9.8 - 12.4 seconds Final   • INR 06/13/2025 0.9  0.9 - 1.1 Final   • Uric Acid 06/13/2025 3.3  2.3 - 6.7 mg/dL Final   • Color, Urine 06/13/2025 Yellow  Light-Yellow, Yellow, Dark-Yellow Final   • Appearance, Urine 06/13/2025 Clear  Clear Final   • Specific Gravity, Urine 06/13/2025 1.024  1.005 - 1.035 Final   • pH, Urine 06/13/2025 6.0  5.0, 5.5, 6.0, 6.5, 7.0, 7.5, 8.0 Final   • Protein, Urine 06/13/2025 NEGATIVE  NEGATIVE, 10 (TRACE), 20 (TRACE) mg/dL Final   • Glucose, Urine 06/13/2025 Normal  Normal mg/dL Final   • Blood, Urine 06/13/2025 1.0 (3+) (A)  NEGATIVE mg/dL Final   • Ketones, Urine 06/13/2025 NEGATIVE  NEGATIVE mg/dL Final   • Bilirubin, Urine 06/13/2025 NEGATIVE  NEGATIVE mg/dL Final   • Urobilinogen, Urine 06/13/2025 Normal  Normal mg/dL Final   • Nitrite, Urine 06/13/2025 NEGATIVE  NEGATIVE Final   • Leukocyte Esterase, Urine 06/13/2025 NEGATIVE  NEGATIVE Final   • WBC, Urine 06/13/2025 1-5  1-5, NONE /HPF Final   • RBC, Urine 06/13/2025 3-5  NONE, 1-2, 3-5 /HPF Final   • Squamous Epithelial Cells, Urine 06/13/2025 1-9 (SPARSE)  Reference range not established. /HPF Final   • Mucus, Urine 06/13/2025 FEW  Reference range not established. /LPF Final   Lab on 05/19/2025   Component Date Value Ref Range Status   • WBC 05/19/2025 5.7  4.4 - 11.3 x10*3/uL Final   • nRBC 05/19/2025 0.0  0.0 - 0.0 /100 WBCs Final   • RBC 05/19/2025 4.46  4.00 - 5.20 x10*6/uL Final   • Hemoglobin  05/19/2025 13.2  12.0 - 16.0 g/dL Final   • Hematocrit 05/19/2025 39.8  36.0 - 46.0 % Final   • MCV 05/19/2025 89  80 - 100 fL Final   • MCH 05/19/2025 29.6  26.0 - 34.0 pg Final   • MCHC 05/19/2025 33.2  32.0 - 36.0 g/dL Final   • RDW 05/19/2025 12.1  11.5 - 14.5 % Final   • Platelets 05/19/2025 256  150 - 450 x10*3/uL Final   • Neutrophils % 05/19/2025 60.1  40.0 - 80.0 % Final   • Immature Granulocytes %, Automated 05/19/2025 0.4  0.0 - 0.9 % Final   • Lymphocytes % 05/19/2025 31.6  13.0 - 44.0 % Final   • Monocytes % 05/19/2025 6.2  2.0 - 10.0 % Final   • Eosinophils % 05/19/2025 1.2  0.0 - 6.0 % Final   • Basophils % 05/19/2025 0.5  0.0 - 2.0 % Final   • Neutrophils Absolute 05/19/2025 3.42  1.20 - 7.70 x10*3/uL Final   • Immature Granulocytes Absolute, Au* 05/19/2025 0.02  0.00 - 0.70 x10*3/uL Final   • Lymphocytes Absolute 05/19/2025 1.80  1.20 - 4.80 x10*3/uL Final   • Monocytes Absolute 05/19/2025 0.35  0.10 - 1.00 x10*3/uL Final   • Eosinophils Absolute 05/19/2025 0.07  0.00 - 0.70 x10*3/uL Final   • Basophils Absolute 05/19/2025 0.03  0.00 - 0.10 x10*3/uL Final   • Glucose 05/19/2025 78  74 - 99 mg/dL Final   • Sodium 05/19/2025 138  136 - 145 mmol/L Final   • Potassium 05/19/2025 4.0  3.5 - 5.3 mmol/L Final   • Chloride 05/19/2025 104  98 - 107 mmol/L Final   • Bicarbonate 05/19/2025 26  21 - 32 mmol/L Final   • Anion Gap 05/19/2025 12  10 - 20 mmol/L Final   • Urea Nitrogen 05/19/2025 14  6 - 23 mg/dL Final   • Creatinine 05/19/2025 0.67  0.50 - 1.05 mg/dL Final   • eGFR 05/19/2025 >90  >60 mL/min/1.73m*2 Final   • Calcium 05/19/2025 9.4  8.6 - 10.6 mg/dL Final   • Albumin 05/19/2025 4.5  3.4 - 5.0 g/dL Final   • Alkaline Phosphatase 05/19/2025 76  33 - 110 U/L Final   • Total Protein 05/19/2025 8.0  6.4 - 8.2 g/dL Final   • AST 05/19/2025 17  9 - 39 U/L Final   • Bilirubin, Total 05/19/2025 0.6  0.0 - 1.2 mg/dL Final   • ALT 05/19/2025 14  7 - 45 U/L Final   • HCG, Beta-Quantitative 05/19/2025 <3  <5  mIU/mL Final   • Adrenocorticotropic Hormone (ACTH) 05/19/2025 12.5  7.2 - 63.3 pg/mL Final   • aPTT 05/19/2025 28  26 - 36 seconds Final   • Cortisol 05/19/2025 15.9  2.5 - 20.0 ug/dL Final   • Creatine Kinase 05/19/2025 81  0 - 215 U/L Final   • C-Reactive Protein 05/19/2025 0.32  <1.00 mg/dL Final   • Ferritin 05/19/2025 22  8 - 150 ng/mL Final   • LDH 05/19/2025 131  84 - 246 U/L Final   • Magnesium 05/19/2025 2.25  1.60 - 2.40 mg/dL Final   • Protime 05/19/2025 9.9  9.8 - 12.4 seconds Final   • INR 05/19/2025 0.9  0.9 - 1.1 Final   • Thyroid Stimulating Hormone 05/19/2025 1.08  0.44 - 3.98 mIU/L Final   • Thyroxine, Free 05/19/2025 1.42  0.78 - 1.48 ng/dL Final   • Uric Acid 05/19/2025 3.4  2.3 - 6.7 mg/dL Final   • Color, Urine 05/19/2025 Yellow  Light-Yellow, Yellow, Dark-Yellow Final   • Appearance, Urine 05/19/2025 Clear  Clear Final   • Specific Gravity, Urine 05/19/2025 1.032  1.005 - 1.035 Final   • pH, Urine 05/19/2025 5.5  5.0, 5.5, 6.0, 6.5, 7.0, 7.5, 8.0 Final   • Protein, Urine 05/19/2025 NEGATIVE  NEGATIVE, 10 (TRACE), 20 (TRACE) mg/dL Final   • Glucose, Urine 05/19/2025 Normal  Normal mg/dL Final   • Blood, Urine 05/19/2025 0.03 (TRACE) (A)  NEGATIVE mg/dL Final   • Ketones, Urine 05/19/2025 NEGATIVE  NEGATIVE mg/dL Final   • Bilirubin, Urine 05/19/2025 NEGATIVE  NEGATIVE mg/dL Final   • Urobilinogen, Urine 05/19/2025 Normal  Normal mg/dL Final   • Nitrite, Urine 05/19/2025 NEGATIVE  NEGATIVE Final   • Leukocyte Esterase, Urine 05/19/2025 NEGATIVE  NEGATIVE Final   • WBC, Urine 05/19/2025 1-5  1-5, NONE /HPF Final   • RBC, Urine 05/19/2025 1-2  NONE, 1-2, 3-5 /HPF Final   • Squamous Epithelial Cells, Urine 05/19/2025 1-9 (SPARSE)  Reference range not established. /HPF Final   • Mucus, Urine 05/19/2025 FEW  Reference range not established. /LPF Final             Radiology Result:     CT chest abdomen pelvis w IV contrast    Result Date: 4/2/2025  Impression: Melanoma restaging scan compared to  PET-CT 01/06/2025: 1. No evidence of disease. 2. Additional incidental non-acute findings as detailed above.     I personally reviewed the images/study and I agree with the findings as stated by Dr. Stephon Cardona. This study was interpreted at [place], [place].   MACRO: None.   Signed by: Rohith Martinez 4/2/2025 9:29 AM Dictation workstation:   [id number]    CT soft tissue neck w IV contrast    Result Date: 4/1/2025  Impression: There is no CT evidence of spine soft tissue mass or yara station lymphadenopathy.   I personally reviewed the images/study and I agree with the findings as stated. This study was interpreted at [place], [place].   MACRO: None   Signed by: Salvador Huynh 4/1/2025 4:58 PM Dictation workstation:   [id number]    MR brain w and wo IV contrast    Result Date: 4/1/2025  Impression: 1. No definitive evidence of brain metastasis. 2. 6 mm lesion in the right frontal scalp is entirely nonspecific and correlation with physical exam findings is recommended. It is similar to slightly increased in size from 02/13/2025.       MACRO: None   Signed by: Nakia Chavez 4/1/2025 1:31 PM Dictation workstation:   [id number]       Pathology Results:  I have reviewed the full pathology report recorded in the EMR. The pertinent portions indicating diagnosis are listed here in the note. for details please refer to the full report recorded in the EMR.    Dermatopathology- DERM LAB: [id number]  Order: [id number]   Collected 1/22/2025 11:41       Status: Edited Result - FINAL       Visible to patient: Yes (seen)       Dx: Malignant melanoma of scalp (Multi)    0 Result Notes      Component    FINAL DIAGNOSIS   SKIN, LEFT CENTRAL FRONTAL SCALP, EXCISION:  MALIGNANT MELANOMA, BRESLOW THICKNESS AT LEAST 3 MM, PRESENT ON THE DEEP MARGIN, SEE NOTE.     Note: Microscopic examination reveals a specimen that extends into the  subcutaneous fat. In the superficial and deep dermis there are nests and fascicles of atypical epithelioid and spindled cells that focally appear to involve the deep margin. There appears to be processing artifact from previous freezing which makes the interpretation of the mitotic rate challenging.     ** Electronically signed out by Bouchra Ayala MD **      Electronically signed by Bouchra Ayala MD on 1/27/2025 at 1111        Micrographic Surgery Details:  Post-operative length (cm): 1.8  Post-operative width (cm): 1.7  Number of Mohs stages: 1  Indication for sending permanent sections: evaluate a debulking specimen     Stage 1     Comments: The patient was brought into the operating room and placed in the procedure chair in the appropriate position.  The area positive by previous biopsy was identified and confirmed with the patient. The area of clinically obvious tumor was debulked using a curette and/or scalpel as needed. An incision was made following the Mohs approach through the skin. The specimen was taken to the lab, divided into 3 piece(s) and appropriately chromacoded and processed.        Debulk:  5mm (will send for permanent)        Tumor features identified on Mohs section: no tumor identified      Depth of invasion: subcutaneous fat     Depth of defect: subcutaneous fat     Patient tolerance of procedure: tolerated well, no immediate complications     Assessment and Plan:   Assessment/Plan      Ms. Tasia Garcia is a 39 y.o. female with a diagnosis of stage IIb melanoma of the scalp. Please see the evolution of the case listed above in the cancer history.     Today 6.13.2025,   In past visit Dr Veloz discussed about different treatment options available for Melanoma. He also discussed the role of immunotherapy in the adjuvant setting. He shared info about SOC vs Clinical trial. Patient decided to participate on study and signed consent. She completed screening requirements for the study  and started treatment on 4.8.2025. To date completed 3 cycles and tolerated well without any major REA's. Today she is C4D1 treatment, She looks clinically good today we will proceed with treatment. Plan discussed in detail with the patient. Today we also educated the patient about Melanoma prevent prevention practice and regular dermatology examinations. Patient in agreement with the plan of care and is aware to call the office and research nurse if she has any questions. RTC per protocol.       # Stage 2B melanoma of scalp  - Proceed with C4D1 treatment on [id number].     DISCLAIMER:   In preparing for this visit and writing this note, I reviewed all the previous electronic medical records (labs, imaging and medical charts) of the patient available in the physician portal. Significant findings which helped in decision making are recorded  in this chart.     The plan was discussed with the patient. We gave him ample opportunities to ask questions. All questions were answered to his satisfaction and he verbalized understanding.      MD Collin Olivo APRN.

## 2025-06-13 NOTE — PROGRESS NOTES
Patient arrived to infusion for study REGE. Patient tolerated infusion well without incident. Schedule reviewed with patient. Patient off unit independently.

## 2025-06-17 ENCOUNTER — APPOINTMENT (OUTPATIENT)
Dept: HEMATOLOGY/ONCOLOGY | Facility: HOSPITAL | Age: 40
End: 2025-06-17
Payer: COMMERCIAL

## 2025-06-23 DIAGNOSIS — C43.4 MALIGNANT MELANOMA OF SCALP (MULTI): ICD-10-CM

## 2025-06-23 RX ORDER — NORGESTREL 0.07 MG/1
1 TABLET ORAL DAILY
Qty: 28 EACH | Refills: 2 | Status: SHIPPED | OUTPATIENT
Start: 2025-06-23

## 2025-06-24 ENCOUNTER — APPOINTMENT (OUTPATIENT)
Dept: DERMATOLOGY | Facility: CLINIC | Age: 40
End: 2025-06-24
Payer: COMMERCIAL

## 2025-06-24 DIAGNOSIS — D22.9 MULTIPLE BENIGN NEVI: ICD-10-CM

## 2025-06-24 DIAGNOSIS — Z12.83 SCREENING EXAM FOR SKIN CANCER: ICD-10-CM

## 2025-06-24 DIAGNOSIS — Z86.018 HISTORY OF DYSPLASTIC NEVUS: ICD-10-CM

## 2025-06-24 DIAGNOSIS — D48.5 NEOPLASM OF UNCERTAIN BEHAVIOR OF SKIN: Primary | ICD-10-CM

## 2025-06-24 DIAGNOSIS — L81.4 LENTIGO: ICD-10-CM

## 2025-06-24 DIAGNOSIS — Z85.820 PERSONAL HISTORY OF MALIGNANT MELANOMA OF SKIN: ICD-10-CM

## 2025-06-24 ASSESSMENT — DERMATOLOGY PATIENT ASSESSMENT
ARE YOU TRYING TO GET PREGNANT: NO
ARE YOU AN ORGAN TRANSPLANT RECIPIENT: NO
HAVE YOU HAD OR DO YOU HAVE A STAPH INFECTION: NO
HAVE YOU HAD OR DO YOU HAVE VASCULAR DISEASE: NO
DO YOU USE A TANNING BED: NO
DO YOU HAVE IRREGULAR MENSTRUAL CYCLES: NO
DO YOU USE SUNSCREEN: DAILY
ARE YOU ON BIRTH CONTROL: YES
DO YOU HAVE ANY NEW OR CHANGING LESIONS: NO

## 2025-06-24 ASSESSMENT — ITCH NUMERIC RATING SCALE: HOW SEVERE IS YOUR ITCHING?: 0

## 2025-06-24 ASSESSMENT — DERMATOLOGY QUALITY OF LIFE (QOL) ASSESSMENT
RATE HOW EMOTIONALLY BOTHERED YOU ARE BY YOUR SKIN PROBLEM (FOR EXAMPLE, WORRY, EMBARRASSMENT, FRUSTRATION): 0 - NEVER BOTHERED
RATE HOW BOTHERED YOU ARE BY EFFECTS OF YOUR SKIN PROBLEMS ON YOUR ACTIVITIES (EG, GOING OUT, ACCOMPLISHING WHAT YOU WANT, WORK ACTIVITIES OR YOUR RELATIONSHIPS WITH OTHERS): 0 - NEVER BOTHERED
RATE HOW BOTHERED YOU ARE BY SYMPTOMS OF YOUR SKIN PROBLEM (EG, ITCHING, STINGING BURNING, HURTING OR SKIN IRRITATION): 0 - NEVER BOTHERED
RATE HOW BOTHERED YOU ARE BY SYMPTOMS OF YOUR SKIN PROBLEM (EG, ITCHING, STINGING BURNING, HURTING OR SKIN IRRITATION): 0 - NEVER BOTHERED
WHAT SINGLE SKIN CONDITION LISTED BELOW IS THE PATIENT ANSWERING THE QUALITY-OF-LIFE ASSESSMENT QUESTIONS ABOUT: NONE OF THE ABOVE
WHAT SINGLE SKIN CONDITION LISTED BELOW IS THE PATIENT ANSWERING THE QUALITY-OF-LIFE ASSESSMENT QUESTIONS ABOUT: NONE OF THE ABOVE
RATE HOW EMOTIONALLY BOTHERED YOU ARE BY YOUR SKIN PROBLEM (FOR EXAMPLE, WORRY, EMBARRASSMENT, FRUSTRATION): 0 - NEVER BOTHERED
RATE HOW BOTHERED YOU ARE BY EFFECTS OF YOUR SKIN PROBLEMS ON YOUR ACTIVITIES (EG, GOING OUT, ACCOMPLISHING WHAT YOU WANT, WORK ACTIVITIES OR YOUR RELATIONSHIPS WITH OTHERS): 0 - NEVER BOTHERED
RATE HOW BOTHERED YOU ARE BY SYMPTOMS OF YOUR SKIN PROBLEM (EG, ITCHING, STINGING BURNING, HURTING OR SKIN IRRITATION): 0 - NEVER BOTHERED
RATE HOW BOTHERED YOU ARE BY EFFECTS OF YOUR SKIN PROBLEMS ON YOUR ACTIVITIES (EG, GOING OUT, ACCOMPLISHING WHAT YOU WANT, WORK ACTIVITIES OR YOUR RELATIONSHIPS WITH OTHERS): 0 - NEVER BOTHERED
RATE HOW EMOTIONALLY BOTHERED YOU ARE BY YOUR SKIN PROBLEM (FOR EXAMPLE, WORRY, EMBARRASSMENT, FRUSTRATION): 0 - NEVER BOTHERED
DATE THE QUALITY-OF-LIFE ASSESSMENT WAS COMPLETED: 67380
ARE THERE EXCLUSIONS OR EXCEPTIONS FOR THE QUALITY OF LIFE ASSESSMENT: NO

## 2025-06-24 ASSESSMENT — PATIENT GLOBAL ASSESSMENT (PGA)
PATIENT GLOBAL ASSESSMENT: PATIENT GLOBAL ASSESSMENT:  1 - CLEAR
WHAT IS THE PGA: PATIENT GLOBAL ASSESSMENT:  1 - CLEAR

## 2025-06-24 NOTE — PROGRESS NOTES
Subjective     Tasia Garcia is a 39 y.o. female who presents for the following: Skin Check. Last derm visit 3/11/25 for Full Skin Exam. History of melanoma and dysplastic nevi    She is enrolled in [id number] Arms A, B, & C - Fianlimab / Cemiplimab or Pembrolizumab, 21 Day Cycles   C1: 04.08.25  C2: 04.29.25  C3: 05.19.25  C4: 06.13.25    Intake Questions  Do you have any new or changing Lesions?: No  Are you an organ transplant recipient?: No  Have you had or do you have a Staph Infection?: No  Have you had or do you have Vacular Disease?: No  Do you use sunscreen?: Daily  Do you use a tanning bed?: No  Are you trying to get pregnant?: No  Are you on birth control?: Yes  If on birth control, what type?: (Patient-Rptd) (P) O type  Do you have irregular menstrual cycles?: No    Review of Systems:  No other skin or systemic complaints other than what is documented elsewhere in the note.    The following portions of the chart were reviewed this encounter and updated as appropriate:  Tobacco  Allergies  Meds  Problems  Med Hx  Surg Hx         Skin Cancer History  Biopsy Log Book  No skin cancers from Specimen Tracking.    Additional History  stage IIB melanoma- ([id number])- AJCC 8th Ed. Left central frontal scalp breslow at least 0.4 mm, transected, diagnosed 10/22/24. Lack of epidermal attachment raises the possibility ofmetastatic melanoma. She initially underwent wide local excision on 12/5/24 with Dr. Madelin Russell, SLNB 0/1, but the margin was positive. She was then sent to Dr. Tevin Anne for Mohs surgery which was done on 1/22/25, debulk demonstrated a breslow of at least 5 mm and the margin was cleared. PET-CT 1/6/25 negative. She met with medical oncology and decided to participate in the REGE 2623 trial.     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 Conservative re-excision was recommended and performed end of 3/2025 per patient report but we do not have record  - dysplastic nevus with moderate atypia, left superior upper back, biopsied 10/17/23 s/p excision 12/01/23 with clear margins    Specialty Problems          Dermatology Problems    Hair thinning    Malignant melanoma of scalp (Multi)        Objective   Well appearing patient in no apparent distress; mood and affect are within normal limits.    A full examination was performed including scalp, head, eyes, ears, nose, lips, neck, chest, axillae, abdomen, back, buttocks, bilateral upper extremities, bilateral lower extremities, hands, feet, fingers, toes, fingernails, and toenails. All findings within normal limits unless otherwise noted below.    Assessment/Plan   Skin Exam  1. NEOPLASM OF UNCERTAIN BEHAVIOR OF SKIN (2)  Right Thigh - Anterior  0.3 cm pink shiny papule with irregular brown macules  Shave removal    Lesion length (cm):  0.3  Lesion width (cm):  0.3  Margin per side (cm):  0.2  Lesion diameter (cm):  0.7  Informed consent: discussed and consent obtained    Timeout: patient name, date of birth, surgical site, and procedure verified    Procedure prep:  Patient was prepped and draped  Anesthesia: the lesion was anesthetized in a standard fashion    Anesthetic:  1% lidocaine w/ epinephrine 1-100,000 local infiltration  Instrument used: DermaBlade    Hemostasis achieved with: aluminum chloride    Outcome: patient tolerated procedure well    Post-procedure details: sterile dressing applied and wound care instructions given     Dressing type: bandage and petrolatum      Staff Communication: Dermatology Local Anesthesia: Lidocaine 0.5% with Epinephrine 1;200,000 - Amount: 6 ml  Specimen 1 - Dermatopathology- DERM LAB  Differential Diagnosis: compound nevus vs atypical nevus vs melanoma; possibly on immunotherapy ([id number] trial, unk which arm)  Check Margins Yes/No?:    Comments:    Dermpath Lab: Routine Histopathology (formalin-fixed tissue)  Left Thigh - Posterior  0.8x0.4 cm pink shiny papule with irregular brown macules    Shave removal    Lesion length (cm):  0.4  Lesion width (cm):  0.8  Margin per side (cm):  0.2  Lesion diameter (cm):  1.2  Informed consent: discussed and consent obtained    Timeout: patient name, date of birth, surgical site, and procedure verified    Procedure prep:  Patient was prepped and draped  Anesthesia: the lesion was anesthetized in a standard fashion    Anesthetic:  1% lidocaine w/ epinephrine 1-100,000 local infiltration  Instrument used: DermaBlade    Hemostasis achieved with: aluminum chloride    Outcome: patient tolerated procedure well    Post-procedure details: sterile dressing applied and wound care instructions given    Dressing type: bandage and petrolatum      Staff Communication: Dermatology Local Anesthesia: Lidocaine 0.5% with Epinephrine 1;200,000 - Amount: 6 ml  Specimen 2 - Dermatopathology- DERM LAB  Differential Diagnosis: compound nevus vs atypical nevus vs melanoma; possibly on immunotherapy ([id number] trial, unk which arm)  Check Margins Yes/No?:    Comments:    Dermpath Lab: Routine Histopathology (formalin-fixed tissue)  2. MULTIPLE BENIGN NEVI  Generalized  Brown and tan macules and papules with reassuring findings on dermoscopy    Photos taken for monitoring:  - 3/11/25 of back  -These lesions have benign, reassuring patterns on dermoscopy  -Recommend continued self observation, and to contact the office if any changes in nevi are noticed  3. LENTIGO  Generalized  Tan  macules  -Benign appearing on exam  -Reassurance, recommend observation  4. PERSONAL HISTORY OF MALIGNANT MELANOMA OF SKIN  Generalized  Lymph node basins palpated in relevant regions without appreciable adenopathy; regions include the neck and/or supraclavicular and/or axillary and/or inguinal and/or popliteal skin.    stage IIB melanoma- ([id number])- AJCC 8th Ed. Left central frontal scalp breslow at least 0.4 mm, transected, diagnosed 10/22/24. Lack of epidermal attachment raises the possibility ofmetastatic melanoma. She initially underwent wide local excision on 12/5/24 with Dr. Madelin Russell, SLNB 0/1, but the margin was positive. She was then sent to Dr. Tevin Anne for Mohs surgery which was done on 1/22/25, debulk demonstrated a breslow of at least 5 mm and the margin was cleared. PET-CT 1/6/25 negative. She met with medical oncology and decided to participate in the REGE 2623 trial.   Personal History of Malignant Melanoma  -Well healed scar(s) with no evidence of recurrence  -Discussed the need for regular full skin examinations in the office, and monthly self examinations at home  -Discussed the need for annual ophthalmology exams with dilation  -Discussed concerning lesions and when to return sooner if any changes noted in skin lesions. Patient verbalizes understanding.  5. HISTORY OF DYSPLASTIC NEVUS  Generalized  - see history above  6. SCREENING EXAM FOR SKIN CANCER  Generalized  Full body skin exam  -No lesions concerning for malignancy on the remainder the skin exam today   - The ugly duckling sign was discussed. Monitor for any skin lesions that are different in color, shape, or size than others on body  -Sun protection was discussed. Recommend SPF 30+, hats with brims, sun protective clothing, and avoiding sun exposure between 10 AM and 2 PM whenever possible  -Recommend regular skin exams or sooner if new or changing lesions     Related Procedures  Follow Up In Dermatology - Established  Patient    Follow up 3 months Full Skin Exam

## 2025-06-24 NOTE — Clinical Note
stage IIB melanoma- ([id number])- AJCC 8th Ed. Left central frontal scalp breslow at least 0.4 mm, transected, diagnosed 10/22/24. Lack of epidermal attachment raises the possibility ofmetastatic melanoma. She initially underwent wide local excision on 12/5/24 with Dr. Madelin Russell, SLNB 0/1, but the margin was positive. She was then sent to Dr. Tevin Anne for Mohs surgery which was done on 1/22/25, debulk demonstrated a breslow of at least 5 mm and the margin was cleared. PET-CT 1/6/25 negative. She met with medical oncology and decided to participate in the REGE 2623 trial.     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 Conservative re-excision was recommended and performed end of 3/2025 per patient report but we do not have record  - dysplastic nevus with moderate atypia, left superior upper back, biopsied 10/17/23 s/p excision 12/01/23 with clear margins

## 2025-06-24 NOTE — Clinical Note
Brown and tan macules and papules with reassuring findings on dermoscopy    Photos taken for monitoring:  - 3/11/25 of back

## 2025-06-24 NOTE — Clinical Note
Lymph node basins palpated in relevant regions without appreciable adenopathy; regions include the neck and/or supraclavicular and/or axillary and/or inguinal and/or popliteal skin.    stage IIB melanoma- ([id number])- AJCC 8th Ed. Left central frontal scalp breslow at least 0.4 mm, transected, diagnosed 10/22/24. Lack of epidermal attachment raises the possibility ofmetastatic melanoma. She initially underwent wide local excision on 12/5/24 with Dr. Madelin Russell, SLNB 0/1, but the margin was positive. She was then sent to Dr. Tevin Anne for Mohs surgery which was done on 1/22/25, debulk demonstrated a breslow of at least 5 mm and the margin was cleared. PET-CT 1/6/25 negative. She met with medical oncology and decided to participate in the REGE 2623 trial.

## 2025-06-26 ENCOUNTER — HOSPITAL ENCOUNTER (OUTPATIENT)
Dept: RADIOLOGY | Facility: HOSPITAL | Age: 40
Discharge: HOME | End: 2025-06-26
Payer: COMMERCIAL

## 2025-06-26 DIAGNOSIS — C43.9 MELANOMA OF SKIN (MULTI): ICD-10-CM

## 2025-06-26 PROCEDURE — 74177 CT ABD & PELVIS W/CONTRAST: CPT

## 2025-06-26 PROCEDURE — 70491 CT SOFT TISSUE NECK W/DYE: CPT

## 2025-06-26 PROCEDURE — 2550000001 HC RX 255 CONTRASTS: Mod: SE | Performed by: INTERNAL MEDICINE

## 2025-06-26 RX ADMIN — IOHEXOL 120 ML: 350 INJECTION, SOLUTION INTRAVENOUS at 10:51

## 2025-06-27 LAB
LAB AP ASR DISCLAIMER: NORMAL
LABORATORY COMMENT REPORT: NORMAL
PATH REPORT.FINAL DX SPEC: NORMAL
PATH REPORT.GROSS SPEC: NORMAL
PATH REPORT.MICROSCOPIC SPEC OTHER STN: NORMAL
PATH REPORT.RELEVANT HX SPEC: NORMAL
PATH REPORT.TOTAL CANCER: NORMAL
PATHOLOGY SYNOPTIC REPORT: NORMAL

## 2025-06-29 DIAGNOSIS — C43.9 MELANOMA OF SKIN (MULTI): ICD-10-CM

## 2025-06-29 NOTE — TUMOR BOARD NOTE
General Patient Information  Name:  Tasia Garcia  Evaluation #:  5  Conference Date: 6/30/2025  YOB: 1985  MRN:  [id number]  Program Physician(s):  Tevin Anne  Referring Physician(s):  Madhavi Andrea, Madelin Russell, Tevin Anne, Libby Cardona/Wes Veloz      Summary   Stage:  History of IIB ([id number])   Melanoma 5 year survival: 87%    Assessment:  Atypical compound melanocytic neoplasm of the right thigh-anterior with concern for a melanoma of Breslow thickness 0.4 mm without ulceration.    Recommendation: WLE with 1 cm margins.    Review Multidisciplinary Cutaneous Oncology Conference recommendation with patient.  Continue routine follow up and total body skin exams with Rosio Velazquez.    Follow Up:  Madhavi Andrea.    History and Physical Exam  Dermatologic History:   39 y.o. female with a history of a biopsy of the left central frontal scalp on 10/22/2024 showing malignant melanoma of the left central frontal scalp, Breslow thickness at least 0.4 mm, moderately transected at the deep margin, without ulceration. The lack of epidermal attachment raises the possibility of metastatic melanoma.    S/p SLNB prior to Mohs surgery on 12/5/2024, showing one benign lymph node with parotid (0/1).    PET-CT from 1/6/2025 shows no evidence of hypermetabolic metastatic disease or lymphadenopathy throughout the body    S/p 1 stage of Mohs on 1/22/2025, debulk layer approximately 5 mm and sent for permanents, showing malignant melanoma with a Breslow of at least 5 mm, broadly transected on the deep margin    Most recently she had a biopsy of the right thigh anterior on 6/24/2025 which showed an atypical compound melanocytic neoplasm with concern for a melanoma of Breslow thickness 0.4 mm without ulceration.    Past Medical History:    Past Medical History:   Diagnosis Date    Dental disease 07/2024    Melanoma of scalp (Multi)     Personal history of other diseases of the female genital tract      History of infertility       Pathology  Dermatopathology- DERM LAB: [id number]  Order: [id number]   Collected 6/24/2025 11:35       Status: Final result       Dx: Neoplasm of uncertain behavior of skin    Test Result Released: Yes (seen)       Messages: Seen    1 Result Note       1 Patient Communication      Component    FINAL DIAGNOSIS   A: SKIN, RIGHT THIGH - ANTERIOR, SHAVE EXCISION:  ATYPICAL COMPOUND MELANOCYTIC NEOPLASM SUGGESTIVE OF MELANOMA, SEE NOTE.     Note: Microscopic examination reveals a specimen that extends into the superficial dermis. There are nested and single melanocytes along the dermal-epidermal junction some of which have moderately to severely enlarged nuclei and moderate cytoplasm. There is occasional pagetoid extension. There are occasional similar-appearing nests of melanocytes in the dermis in addition to smaller melanocytes. The epidermal melanocytes stain in greater than seventy-five percent with antibodies against PRAME and there are some superficial melanocytes that stain with antibodies against PRAME. These melanocytes stain with antibodies against p16. A SOX-10 stain reveals a non-continuous proliferation with occasional pagetoid extension. All control slides stain appropriately.     These findings are suggestive of a malignant melanoma with a Breslow thickness of 0.4 mm in association with a compound nevus.      ** Electronically signed out by Bouchra Ayala MD **      Electronically signed by Bouchra Ayala MD on 6/27/2025 at 1356 EDT        By the signature on this report, the individual or group listed as making the Final Interpretation/Diagnosis certifies that they have reviewed this case.    Clinical History    Encounter Diagnosis: Neoplasm of uncertain behavior of skin         [id number] A  Collection Comments: Differential Diagnosis: compound nevus vs atypical nevus vs melanoma; possibly on immunotherapy ([id number] trial, unk which arm)  Check Margins Yes/No?:    Comments:     Dermpath Lab: Routine Histopathology (formalin-fixed tissue)  Finding Region: Right Thigh - Anterior  Specimen Objective: 0.3 cm pink shiny papule with irregular brown macules        Microscopic Description          Disclaimer    One or more of the reagents used to perform assays on this specimen MAY have contained components considered to be analyte specific reagents (ASR's).  ASR's have not been cleared or approved by the U.S. Food and Drug Administration.  These assays were developed and their performance characteristics determined by the Department of Pathology at [facility]. The FDA does not require this test to go through premarket FDA review. This test is used for clinical purposes. It should not be regarded as investigational or for research. This laboratory is certified under the Clinical Laboratory Improvement Amendments (CLIA) as qualified to perform high complexity clinical laboratory testing.  The assays were performed with appropriate positive and negative controls which stained appropriately.   Gross Description    A:  Received in formalin is a 7 x 6 x 1 mm piece of skin.  The specimen was bisected.  It is tan in color.  It is shave in shape.  It was embedded in toto.  The specimen was inked.         The specimen was grossed by Kamilla Cobb.        Case Summary Report   MELANOMA OF THE SKIN: Biopsy   8th Edition - Protocol posted: 3/23/2022MELANOMA OF THE SKIN: BIOPSY - A  SPECIMEN   Procedure  Biopsy, shave   Specimen Laterality  Right   TUMOR   Tumor Site  Skin of lower limb and hip: Right thigh-anterior          Histologic Type  Superficial spreading melanoma (low-cumulative sun damage (CSD) melanoma)   Maximum Tumor (Breslow) Thickness (Millimeters)  0.4 mm   Ulceration  Not identified   Anatomic (Flex) Level  III (melanoma fills and expands papillary dermis)   Mitotic Rate  None identified   Microsatellite(s)  Not identified   Lymphovascular Invasion  Not  identified   Neurotropism  Not identified   Tumor-Infiltrating Lymphocytes  Not identified   Tumor Regression  Not identified   MARGINS     Margin Status for Invasive Melanoma  All margins negative for invasive melanoma   Margin Status for Melanoma in situ  All margins negative for melanoma in situ   PATHOLOGIC STAGE CLASSIFICATION (pTNM, AJCC 8th Edition)     pT Category  pT1a   .

## 2025-06-30 ENCOUNTER — APPOINTMENT (OUTPATIENT)
Dept: RADIOLOGY | Facility: HOSPITAL | Age: 40
End: 2025-06-30
Payer: COMMERCIAL

## 2025-06-30 ENCOUNTER — TUMOR BOARD CONFERENCE (OUTPATIENT)
Dept: HEMATOLOGY/ONCOLOGY | Facility: HOSPITAL | Age: 40
End: 2025-06-30
Payer: COMMERCIAL

## 2025-07-01 ENCOUNTER — APPOINTMENT (OUTPATIENT)
Dept: HEMATOLOGY/ONCOLOGY | Facility: HOSPITAL | Age: 40
End: 2025-07-01
Payer: COMMERCIAL

## 2025-07-07 DIAGNOSIS — C43.4 MALIGNANT MELANOMA OF SCALP (MULTI): ICD-10-CM

## 2025-07-08 ENCOUNTER — EDUCATION (OUTPATIENT)
Dept: HEMATOLOGY/ONCOLOGY | Facility: HOSPITAL | Age: 40
End: 2025-07-08
Payer: COMMERCIAL

## 2025-07-08 ENCOUNTER — LAB (OUTPATIENT)
Dept: LAB | Facility: HOSPITAL | Age: 40
End: 2025-07-08
Payer: COMMERCIAL

## 2025-07-08 ENCOUNTER — OFFICE VISIT (OUTPATIENT)
Dept: HEMATOLOGY/ONCOLOGY | Facility: HOSPITAL | Age: 40
End: 2025-07-08
Payer: COMMERCIAL

## 2025-07-08 ENCOUNTER — INFUSION (OUTPATIENT)
Dept: HEMATOLOGY/ONCOLOGY | Facility: HOSPITAL | Age: 40
End: 2025-07-08
Payer: COMMERCIAL

## 2025-07-08 VITALS
WEIGHT: 204.59 LBS | TEMPERATURE: 97.3 F | HEART RATE: 71 BPM | SYSTOLIC BLOOD PRESSURE: 125 MMHG | DIASTOLIC BLOOD PRESSURE: 72 MMHG | RESPIRATION RATE: 20 BRPM | OXYGEN SATURATION: 100 % | BODY MASS INDEX: 38.03 KG/M2

## 2025-07-08 DIAGNOSIS — C43.9 MELANOMA OF SKIN (MULTI): ICD-10-CM

## 2025-07-08 DIAGNOSIS — C43.4 MALIGNANT MELANOMA OF SCALP (MULTI): ICD-10-CM

## 2025-07-08 DIAGNOSIS — C43.4 MALIGNANT MELANOMA OF SCALP (MULTI): Primary | ICD-10-CM

## 2025-07-08 LAB
ALBUMIN SERPL BCP-MCNC: 4.1 G/DL (ref 3.4–5)
ALP SERPL-CCNC: 67 U/L (ref 33–110)
ALT SERPL W P-5'-P-CCNC: 16 U/L (ref 7–45)
ANION GAP SERPL CALC-SCNC: 11 MMOL/L (ref 10–20)
APPEARANCE UR: CLEAR
APTT PPP: 27 SECONDS (ref 26–36)
AST SERPL W P-5'-P-CCNC: 19 U/L (ref 9–39)
B-HCG SERPL-ACNC: <3 MIU/ML
BASOPHILS # BLD AUTO: 0.02 X10*3/UL (ref 0–0.1)
BASOPHILS NFR BLD AUTO: 0.3 %
BILIRUB SERPL-MCNC: 0.9 MG/DL (ref 0–1.2)
BILIRUB UR STRIP.AUTO-MCNC: NEGATIVE MG/DL
BUN SERPL-MCNC: 15 MG/DL (ref 6–23)
CALCIUM SERPL-MCNC: 9.3 MG/DL (ref 8.6–10.3)
CHLORIDE SERPL-SCNC: 102 MMOL/L (ref 98–107)
CK SERPL-CCNC: 81 U/L (ref 0–215)
CO2 SERPL-SCNC: 26 MMOL/L (ref 21–32)
COLOR UR: ABNORMAL
CORTIS SERPL-MCNC: 11.4 UG/DL (ref 2.5–20)
CREAT SERPL-MCNC: 0.68 MG/DL (ref 0.5–1.05)
CRP SERPL-MCNC: 0.23 MG/DL
EGFRCR SERPLBLD CKD-EPI 2021: >90 ML/MIN/1.73M*2
EOSINOPHIL # BLD AUTO: 0.05 X10*3/UL (ref 0–0.7)
EOSINOPHIL NFR BLD AUTO: 0.8 %
ERYTHROCYTE [DISTWIDTH] IN BLOOD BY AUTOMATED COUNT: 12.5 % (ref 11.5–14.5)
FERRITIN SERPL-MCNC: 15 NG/ML (ref 8–150)
GLUCOSE SERPL-MCNC: 99 MG/DL (ref 74–99)
GLUCOSE UR STRIP.AUTO-MCNC: NORMAL MG/DL
HCT VFR BLD AUTO: 39.5 % (ref 36–46)
HGB BLD-MCNC: 13.1 G/DL (ref 12–16)
IMM GRANULOCYTES # BLD AUTO: 0.01 X10*3/UL (ref 0–0.7)
IMM GRANULOCYTES NFR BLD AUTO: 0.2 % (ref 0–0.9)
INR PPP: 0.9 (ref 0.9–1.1)
KETONES UR STRIP.AUTO-MCNC: NEGATIVE MG/DL
LDH SERPL L TO P-CCNC: 128 U/L (ref 84–246)
LEUKOCYTE ESTERASE UR QL STRIP.AUTO: NEGATIVE
LYMPHOCYTES # BLD AUTO: 1.6 X10*3/UL (ref 1.2–4.8)
LYMPHOCYTES NFR BLD AUTO: 26.1 %
MAGNESIUM SERPL-MCNC: 2.08 MG/DL (ref 1.6–2.4)
MCH RBC QN AUTO: 29.3 PG (ref 26–34)
MCHC RBC AUTO-ENTMCNC: 33.2 G/DL (ref 32–36)
MCV RBC AUTO: 88 FL (ref 80–100)
MONOCYTES # BLD AUTO: 0.41 X10*3/UL (ref 0.1–1)
MONOCYTES NFR BLD AUTO: 6.7 %
MUCOUS THREADS #/AREA URNS AUTO: NORMAL /LPF
NEUTROPHILS # BLD AUTO: 4.04 X10*3/UL (ref 1.2–7.7)
NEUTROPHILS NFR BLD AUTO: 65.9 %
NITRITE UR QL STRIP.AUTO: NEGATIVE
NRBC BLD-RTO: 0 /100 WBCS (ref 0–0)
PH UR STRIP.AUTO: 5.5 [PH]
PLATELET # BLD AUTO: 292 X10*3/UL (ref 150–450)
POTASSIUM SERPL-SCNC: 4.1 MMOL/L (ref 3.5–5.3)
PROT SERPL-MCNC: 7.5 G/DL (ref 6.4–8.2)
PROT UR STRIP.AUTO-MCNC: NEGATIVE MG/DL
PROTHROMBIN TIME: 9.7 SECONDS (ref 9.8–12.4)
RBC # BLD AUTO: 4.47 X10*6/UL (ref 4–5.2)
RBC # UR STRIP.AUTO: ABNORMAL MG/DL
RBC #/AREA URNS AUTO: NORMAL /HPF
SODIUM SERPL-SCNC: 135 MMOL/L (ref 136–145)
SP GR UR STRIP.AUTO: 1.02
SQUAMOUS #/AREA URNS AUTO: NORMAL /HPF
T4 FREE SERPL-MCNC: 1.25 NG/DL (ref 0.78–1.48)
TSH SERPL-ACNC: 1.11 MIU/L (ref 0.44–3.98)
URATE SERPL-MCNC: 2.6 MG/DL (ref 2.3–6.7)
UROBILINOGEN UR STRIP.AUTO-MCNC: NORMAL MG/DL
WBC # BLD AUTO: 6.1 X10*3/UL (ref 4.4–11.3)
WBC #/AREA URNS AUTO: NORMAL /HPF

## 2025-07-08 PROCEDURE — 82550 ASSAY OF CK (CPK): CPT

## 2025-07-08 PROCEDURE — 82024 ASSAY OF ACTH: CPT

## 2025-07-08 PROCEDURE — 99214 OFFICE O/P EST MOD 30 MIN: CPT | Performed by: INTERNAL MEDICINE

## 2025-07-08 PROCEDURE — 81001 URINALYSIS AUTO W/SCOPE: CPT

## 2025-07-08 PROCEDURE — 82533 TOTAL CORTISOL: CPT

## 2025-07-08 PROCEDURE — 84550 ASSAY OF BLOOD/URIC ACID: CPT

## 2025-07-08 PROCEDURE — 82728 ASSAY OF FERRITIN: CPT

## 2025-07-08 PROCEDURE — 2560000001 HC RX 256 EXPERIMENTAL DRUGS: Mod: SE | Performed by: INTERNAL MEDICINE

## 2025-07-08 PROCEDURE — 36415 COLL VENOUS BLD VENIPUNCTURE: CPT

## 2025-07-08 PROCEDURE — 86140 C-REACTIVE PROTEIN: CPT

## 2025-07-08 PROCEDURE — 85610 PROTHROMBIN TIME: CPT

## 2025-07-08 PROCEDURE — C9399 UNCLASSIFIED DRUGS OR BIOLOG: HCPCS | Mod: SE | Performed by: INTERNAL MEDICINE

## 2025-07-08 PROCEDURE — 85730 THROMBOPLASTIN TIME PARTIAL: CPT

## 2025-07-08 PROCEDURE — 84439 ASSAY OF FREE THYROXINE: CPT

## 2025-07-08 PROCEDURE — 96413 CHEMO IV INFUSION 1 HR: CPT

## 2025-07-08 PROCEDURE — 80053 COMPREHEN METABOLIC PANEL: CPT

## 2025-07-08 PROCEDURE — 85025 COMPLETE CBC W/AUTO DIFF WBC: CPT

## 2025-07-08 PROCEDURE — 84702 CHORIONIC GONADOTROPIN TEST: CPT

## 2025-07-08 PROCEDURE — 84443 ASSAY THYROID STIM HORMONE: CPT

## 2025-07-08 PROCEDURE — 83615 LACTATE (LD) (LDH) ENZYME: CPT

## 2025-07-08 PROCEDURE — 83735 ASSAY OF MAGNESIUM: CPT

## 2025-07-08 RX ORDER — EPINEPHRINE 0.3 MG/.3ML
0.3 INJECTION SUBCUTANEOUS EVERY 5 MIN PRN
Status: CANCELLED | OUTPATIENT
Start: 2025-07-08

## 2025-07-08 RX ORDER — FAMOTIDINE 10 MG/ML
20 INJECTION, SOLUTION INTRAVENOUS ONCE AS NEEDED
Status: CANCELLED | OUTPATIENT
Start: 2025-07-08

## 2025-07-08 RX ORDER — DIPHENHYDRAMINE HYDROCHLORIDE 50 MG/ML
50 INJECTION, SOLUTION INTRAMUSCULAR; INTRAVENOUS AS NEEDED
Status: CANCELLED | OUTPATIENT
Start: 2025-07-08

## 2025-07-08 RX ORDER — ALBUTEROL SULFATE 0.83 MG/ML
3 SOLUTION RESPIRATORY (INHALATION) AS NEEDED
Status: CANCELLED | OUTPATIENT
Start: 2025-07-08

## 2025-07-08 RX ORDER — PROCHLORPERAZINE EDISYLATE 5 MG/ML
10 INJECTION INTRAMUSCULAR; INTRAVENOUS EVERY 6 HOURS PRN
Status: CANCELLED | OUTPATIENT
Start: 2025-07-08

## 2025-07-08 RX ORDER — PROCHLORPERAZINE MALEATE 10 MG
10 TABLET ORAL EVERY 6 HOURS PRN
Status: CANCELLED | OUTPATIENT
Start: 2025-07-08

## 2025-07-08 RX ADMIN — Medication 1 DOSE: at 12:30

## 2025-07-08 ASSESSMENT — ENCOUNTER SYMPTOMS
VOMITING: 0
FLANK PAIN: 0
FATIGUE: 0
CONFUSION: 0
SHORTNESS OF BREATH: 0
ABDOMINAL PAIN: 0
HOT FLASHES: 0
DEPRESSION: 0
CHILLS: 0
FREQUENCY: 0
EXTREMITY WEAKNESS: 0
BACK PAIN: 0
APPETITE CHANGE: 0
DYSURIA: 0
HEMATURIA: 0
SCLERAL ICTERUS: 0
CHEST TIGHTNESS: 0
LEG SWELLING: 0
NERVOUS/ANXIOUS: 0
NAUSEA: 0
CONSTIPATION: 0
FEVER: 0
SEIZURES: 0
HEMOPTYSIS: 0
COUGH: 0
DIFFICULTY URINATING: 0
EYE PROBLEMS: 0
ADENOPATHY: 0
PALPITATIONS: 0
DIZZINESS: 0
TROUBLE SWALLOWING: 0
SORE THROAT: 0
MYALGIAS: 0
DIARRHEA: 0

## 2025-07-08 ASSESSMENT — PAIN SCALES - GENERAL: PAINLEVEL_OUTOF10: 0-NO PAIN

## 2025-07-08 ASSESSMENT — VISUAL ACUITY: OU: 1

## 2025-07-08 NOTE — RESEARCH NOTES
Research Note Treatment Day    Tasia Garcia is here today for treatment on [id number]. Today is C5D1. Procedures completed per protocol. AE's and con-meds reviewed with patient. Patient is aware of treatment plan. No new con-meds; Tasia is slightly hyponatremic. Tasia shared that she had 2 areas that had biopsies done. One showed atypical compound melanocytic neoplasm suggestive of melanoma the other was DERMAL NEVUS WITH DESMOPLASIA.  Dr. Veloz explained both to Tasia,    [x]   Received treatment as planned   OR  []    Treatment delayed; patient calendar updated as required   Treatment delayed because:    []   AE    []   Physician Discretion    []   Clinical Deterioration or Progression     []   Other    Education Documentation  Diagnostic Studies, taught by Joana House RN at 7/8/2025 10:47 AM.  Learner: Patient  Readiness: Eager  Method: Explanation  Response: Verbalizes Understanding    Comprehensive Metabolic Panel (CMP), taught by Joana House RN at 7/8/2025 10:47 AM.  Learner: Patient  Readiness: Eager  Method: Explanation  Response: Verbalizes Understanding    Complete Blood Count with Differential (CBC w/ Diff), taught by Joana House RN at 7/8/2025 10:47 AM.  Learner: Patient  Readiness: Eager  Method: Explanation  Response: Verbalizes Understanding    Education Comments  No comments found.

## 2025-07-08 NOTE — LETTER
July 8, 2025     Madhavi Andrea MD  [address] [place] [address] Dr Marichuy Pringle [place], [place] [id number]    Patient: Tasia Garcia   YOB: 1985   Date of Visit: 7/8/2025       Dear Dr. Madhavi Andrea MD:    Thank you for referring Tasia Garcia to me for evaluation. Below are my notes for this consultation.  If you have questions, please do not hesitate to call me. I look forward to following your patient along with you.       Sincerely,     Wes Veloz MD      CC: Shanice Dickinson, SONIA Lobo, APRN-CNP  ______________________________________________________________________________________    .Patient ID: Tasia Garcia is a 39 y.o. female.  Referring Physician: Wes Veloz MD  [id number] [place] [address]  Primary Care Provider: Arya Ku MD     Chief Complaint   Patient presents with   • Follow-up     Immunotherapy follow-up.  Clinical trial follow-up.  Toxicity check   • Skin Cancer     Stage III melanoma on clinical trial      Oncology History Overview Note   Ms. Tasia Garcia is diagnosed with stage IIb melanoma of the scalp.  She first presented to ENT oncology in November 2020 for and underwent wide local excision and sentinel lymph node biopsy on 12/5/2024 which showed a 3 mm deep cutaneous melanoma positive on the margins.  The sentinel lymph node was traced to the parotid lymph node which was negative for an deposit.  Since the margin was positive, the patient was referred for Mohs surgery which he completed on 1/22/2025 with Dr. Tevin Anne and a negative margin was achieved.  The final Breslow depth was labeled at 5 mm, no ulceration, no LVI-pT4a melanoma.  PET/CT scan on 1/6/2025 was negative for any distant metastatic deposits.  MRI brain on 2/13/2025 was negative for intracranial deposits. We spoke to the patient and she agreed to participate in REGE 2623.  She started adjuvant treatment on 4/8/2025.  Restaging CT scan on  6/26/2025 did not show any evidence of recurrence.  A small suspicious lesion was removed from the right outer thigh on 6/24/2025 which was read as atypical melanocytic neoplasm.  Since the CT scan right after removal of this lesion did not show any metastatic disease, further workup was not recommended.     Malignant melanoma of scalp (Multi)   11/15/2024 Initial Diagnosis    Referring Surgeon: Enzo Russell and Tevin Anne   Dermatologist: Rosio Velazquez   Date of first meeting with our team: 02/12/2025    Date of First meeting with surgical team: 11/12/2024  Melanoma type: Cutaneous  Location of primary site: Scalp-primary dermal  Date of WLE and SLNB: Initial resection on 12/5/2024 (Dr. Madelin Russell) followed by Mohs surgery (Dr. Tevin Anne) to clear margins on 1/22/25.  Final pathology: Breslow Depth-5 mm; Ulceration status-negative; LVI-negative; Other high risk features-negative  [place] lymph node status: 1 node examined in the parotid gland-found to be negative for malignancy  Final Stage: pT4a N0 M0-stage IIb melanoma- AJCC 8th Ed.    BRAF status: Not known  Initial MRI brain with and without contrast: 2/13/2025-unremarkable  Initial Full body PET scan: 01/6/2025-unremarkable       1/1/2025 Cancer Staged    Staging form: Melanoma of the Skin, AJCC 8th Edition, Pathologic stage from 1/1/2025: Stage IIB (pT4a, pN0, cM0) - Signed by Wes Veloz MD on 2/13/2025 4/8/2025 -  Research Study Participant    ([facility]) [id number] Arms A, B, & C - Fianlimab / Cemiplimab or Pembrolizumab, 21 Day Cycles  Plan Provider: BEN Prado-CNP  Treatment goal: Curative  Line of treatment: Adjuvant  Associated studies: Fianlimab and Cemiplimab Versus Pembrolizumab in Resected High-risk Melanoma        Ms. Tasia Garcia is diagnosed with stage IIB melanoma- ([id number])- AJCC 8th Ed. She initially underwent wide local excision on 12/5/24 with Dr. Madelin Russell, but the margin was positive. She was  "then sent to Dr. Tevin Anne for Mohs surgery which was done on 1/22/25 and the margin was cleared. The sentinel nodes were negative. She met with medical oncology and decided to participate in the REGE 2623 trial.  The patient started adjuvant immunotherapy on 4/8/2025.  She had finished first 4 cycles without any event.  First CT scan done on 6/26/2025 did not show any evidence of recurrence.    Of note, the patient had a suspicious lesion removed from the right outer thigh on 6/24/2025 which returned back as atypical melanocytic neoplasm on pathologic examination however deemed to be malignant melanoma with a Breslow thickness of 0.4 mm.  This was separate from the primary melanoma which was present on the head and neck region.    Systemic treatment  A. [id number] Arms A, B, & C - Fianlimab / Cemiplimab or Pembrolizumab, 21 Day Cycles   C1: 04.08.25  C2: 04.29.25  C3: 05.19.25  C4: 06.13.25  C5: 07.08.25      Subjective  Please refer to \"Notes/Cancer History\" above for complete History of present illness.     Ms. Tasia Garcia has done wel she does not report any signs or symptoms suggestive of immunotherapy related toxicity so far. She wants to understand the pathology report from the recent excision.      Review of Systems:   Review of Systems   Constitutional:  Negative for appetite change, chills, fatigue and fever.   HENT:   Negative for hearing loss, lump/mass, mouth sores, sore throat and trouble swallowing.    Eyes:  Negative for eye problems and icterus.   Respiratory:  Negative for chest tightness, cough, hemoptysis and shortness of breath.    Cardiovascular:  Negative for chest pain, leg swelling and palpitations.   Gastrointestinal:  Negative for abdominal pain, constipation, diarrhea, nausea and vomiting.   Endocrine: Negative for hot flashes.   Genitourinary:  Negative for difficulty urinating, dysuria, frequency and hematuria.    Musculoskeletal:  Negative for back pain, flank pain, " gait problem and myalgias.   Skin:  Negative for itching and rash.   Neurological:  Negative for dizziness, extremity weakness, gait problem and seizures.   Hematological:  Negative for adenopathy.   Psychiatric/Behavioral:  Negative for confusion and depression. The patient is not nervous/anxious.          MEDICAL HISTORY  Past Medical History:   Diagnosis Date   • Dental disease 07/2024   • Melanoma of scalp (Multi)    • Personal history of other diseases of the female genital tract     History of infertility       FAMILY HISTORY  Family History   Problem Relation Name Age of Onset   • Other (cervical carcinoma) Mother Kimberly fallon    • Cancer Mother Kimberly fallon    • Stroke Mother Kimberly fallon    • Ovarian cancer Mother Kimberly fallon 45   • Cerebral palsy Sister     • Breast cancer Paternal Grandmother Laura Fallon         AGE UNKNOWN       TOBACCO HISTORY  Tobacco Use: Low Risk  (7/8/2025)    Patient History    • Smoking Tobacco Use: Never    • Smokeless Tobacco Use: Never    • Passive Exposure: Never       SOCIAL HISTORY  Social Connections: Moderately Integrated (2/12/2025)    Social Connection and Isolation Panel [NHANES]    • Frequency of Communication with Friends and Family: More than three times a week    • Frequency of Social Gatherings with Friends and Family: More than three times a week    • Attends Gnosticist Services: More than 4 times per year    • Active Member of Clubs or Organizations: Yes    • Attends Club or Organization Meetings: More than 4 times per year    • Marital Status:    Recent Concern: Social Connections - At Risk (1/25/2025)    Received from [employer]    • How often do you see or talk to people that you care about and feel close to? (For example: talking to friends on phone, visiting friends or family, going to Zoroastrianism or club meetings): 2        Outpatient Medication Profile:  Current Outpatient Medications on File Prior to Visit   Medication Sig Dispense  Refill   • norgestrel (Opill) 0.075 mg tablet Take 1 tablet by mouth once daily. This is not used for an Emergency Contraceptive but taken daily 28 each 2   • prenatal vit,calc76-iron-folic (Prenatabs Rx) 29 mg iron- 1 mg tablet Take 1 tablet by mouth once daily.       No current facility-administered medications on file prior to visit.         Performance Status:  Asymptomatic     Vitals and Measurements:   /72   Pulse 71   Temp 36.3 °C (97.3 °F) (Core)   Resp 20   Wt 92.8 kg (204 lb 9.4 oz)   SpO2 100%   BMI 38.03 kg/m²       Physical Exam:   Physical Exam  Constitutional:       General: She is awake.      Appearance: Normal appearance. She is well-developed.   HENT:      Head: Normocephalic and atraumatic.   Eyes:      General: Lids are normal. Vision grossly intact.   Neck:      Thyroid: No thyroid mass.   Cardiovascular:      Rate and Rhythm: Normal rate and regular rhythm.      Heart sounds: Normal heart sounds, S1 normal and S2 normal.   Pulmonary:      Effort: Pulmonary effort is normal.      Breath sounds: Normal breath sounds and air entry. No decreased breath sounds.   Abdominal:      General: Abdomen is flat. Bowel sounds are normal.      Palpations: Abdomen is soft.      Tenderness: There is no abdominal tenderness.   Musculoskeletal:      Cervical back: Normal range of motion and neck supple. No edema or rigidity.   Lymphadenopathy:      Upper Body:      Right upper body: No axillary adenopathy.      Lower Body: No right inguinal adenopathy. No left inguinal adenopathy.   Skin:     General: Skin is warm and moist.      Capillary Refill: Capillary refill takes less than 2 seconds.   Neurological:      Mental Status: She is alert and oriented to person, place, and time.      Cranial Nerves: Cranial nerves 2-12 are intact.      Sensory: Sensation is intact.      Motor: Motor function is intact.   Psychiatric:         Attention and Perception: Attention normal.         Mood and Affect: Mood and  affect normal.         Speech: Speech normal.         Behavior: Behavior is cooperative.              Lab Results:  I have reviewed these laboratory results:     Lab on 07/08/2025   Component Date Value Ref Range Status   • WBC 07/08/2025 6.1  4.4 - 11.3 x10*3/uL Final   • nRBC 07/08/2025 0.0  0.0 - 0.0 /100 WBCs Final   • RBC 07/08/2025 4.47  4.00 - 5.20 x10*6/uL Final   • Hemoglobin 07/08/2025 13.1  12.0 - 16.0 g/dL Final   • Hematocrit 07/08/2025 39.5  36.0 - 46.0 % Final   • MCV 07/08/2025 88  80 - 100 fL Final   • MCH 07/08/2025 29.3  26.0 - 34.0 pg Final   • MCHC 07/08/2025 33.2  32.0 - 36.0 g/dL Final   • RDW 07/08/2025 12.5  11.5 - 14.5 % Final   • Platelets 07/08/2025 292  150 - 450 x10*3/uL Final   • Neutrophils % 07/08/2025 65.9  40.0 - 80.0 % Final   • Immature Granulocytes %, Automated 07/08/2025 0.2  0.0 - 0.9 % Final   • Lymphocytes % 07/08/2025 26.1  13.0 - 44.0 % Final   • Monocytes % 07/08/2025 6.7  2.0 - 10.0 % Final   • Eosinophils % 07/08/2025 0.8  0.0 - 6.0 % Final   • Basophils % 07/08/2025 0.3  0.0 - 2.0 % Final   • Neutrophils Absolute 07/08/2025 4.04  1.20 - 7.70 x10*3/uL Final   • Immature Granulocytes Absolute, Au* 07/08/2025 0.01  0.00 - 0.70 x10*3/uL Final   • Lymphocytes Absolute 07/08/2025 1.60  1.20 - 4.80 x10*3/uL Final   • Monocytes Absolute 07/08/2025 0.41  0.10 - 1.00 x10*3/uL Final   • Eosinophils Absolute 07/08/2025 0.05  0.00 - 0.70 x10*3/uL Final   • Basophils Absolute 07/08/2025 0.02  0.00 - 0.10 x10*3/uL Final   • Glucose 07/08/2025 99  74 - 99 mg/dL Final   • Sodium 07/08/2025 135 (L)  136 - 145 mmol/L Final   • Potassium 07/08/2025 4.1  3.5 - 5.3 mmol/L Final   • Chloride 07/08/2025 102  98 - 107 mmol/L Final   • Bicarbonate 07/08/2025 26  21 - 32 mmol/L Final   • Anion Gap 07/08/2025 11  10 - 20 mmol/L Final   • Urea Nitrogen 07/08/2025 15  6 - 23 mg/dL Final   • Creatinine 07/08/2025 0.68  0.50 - 1.05 mg/dL Final   • eGFR 07/08/2025 >90  >60 mL/min/1.73m*2 Final   •  Calcium 07/08/2025 9.3  8.6 - 10.3 mg/dL Final   • Albumin 07/08/2025 4.1  3.4 - 5.0 g/dL Final   • Alkaline Phosphatase 07/08/2025 67  33 - 110 U/L Final   • Total Protein 07/08/2025 7.5  6.4 - 8.2 g/dL Final   • AST 07/08/2025 19  9 - 39 U/L Final   • Bilirubin, Total 07/08/2025 0.9  0.0 - 1.2 mg/dL Final   • ALT 07/08/2025 16  7 - 45 U/L Final   • HCG, Beta-Quantitative 07/08/2025 <3  <5 mIU/mL Final   • aPTT 07/08/2025 27  26 - 36 seconds Final   • Cortisol 07/08/2025 11.4  2.5 - 20.0 ug/dL Final   • Creatine Kinase 07/08/2025 81  0 - 215 U/L Final   • C-Reactive Protein 07/08/2025 0.23  <1.00 mg/dL Final   • Ferritin 07/08/2025 15  8 - 150 ng/mL Final   • LDH 07/08/2025 128  84 - 246 U/L Final   • Magnesium 07/08/2025 2.08  1.60 - 2.40 mg/dL Final   • Protime 07/08/2025 9.7 (L)  9.8 - 12.4 seconds Final   • INR 07/08/2025 0.9  0.9 - 1.1 Final   • Thyroid Stimulating Hormone 07/08/2025 1.11  0.44 - 3.98 mIU/L Final   • Thyroxine, Free 07/08/2025 1.25  0.78 - 1.48 ng/dL Final   • Uric Acid 07/08/2025 2.6  2.3 - 6.7 mg/dL Final   • Color, Urine 07/08/2025 Light-Yellow  Light-Yellow, Yellow, Dark-Yellow Final   • Appearance, Urine 07/08/2025 Clear  Clear Final   • Specific Gravity, Urine 07/08/2025 1.024  1.005 - 1.035 Final   • pH, Urine 07/08/2025 5.5  5.0, 5.5, 6.0, 6.5, 7.0, 7.5, 8.0 Final   • Protein, Urine 07/08/2025 NEGATIVE  NEGATIVE, 10 (TRACE), 20 (TRACE) mg/dL Final   • Glucose, Urine 07/08/2025 Normal  Normal mg/dL Final   • Blood, Urine 07/08/2025 0.06 (1+) (A)  NEGATIVE mg/dL Final   • Ketones, Urine 07/08/2025 NEGATIVE  NEGATIVE mg/dL Final   • Bilirubin, Urine 07/08/2025 NEGATIVE  NEGATIVE mg/dL Final   • Urobilinogen, Urine 07/08/2025 Normal  Normal mg/dL Final   • Nitrite, Urine 07/08/2025 NEGATIVE  NEGATIVE Final   • Leukocyte Esterase, Urine 07/08/2025 NEGATIVE  NEGATIVE Final   • WBC, Urine 07/08/2025 NONE  1-5, NONE /HPF Final   • RBC, Urine 07/08/2025 1-2  NONE, 1-2, 3-5 /HPF Final   •  Squamous Epithelial Cells, Urine 07/08/2025 1-9 (SPARSE)  Reference range not established. /HPF Final   • Mucus, Urine 07/08/2025 FEW  Reference range not established. /LPF Final   Office Visit on 06/24/2025   Component Date Value Ref Range Status   • Case Report 06/24/2025    Final                    Value:Dermatopathology                                  Case: [id number]                                   Authorizing Provider:  Madhavi Andrea MD       Collected:           06/24/2025 3904              Ordering Location:     [facility]       Received:            06/24/2025 1613              Pathologist:           Bouchra Ayala MD                                                             Specimens:   A) - SKIN, Right Thigh - Anterior                                                                   B) - SKIN, Left Thigh - Posterior                                                         • FINAL DIAGNOSIS 06/24/2025    Final                    Value:A: SKIN, RIGHT THIGH - ANTERIOR, SHAVE EXCISION:  ATYPICAL COMPOUND MELANOCYTIC NEOPLASM SUGGESTIVE OF MELANOMA, SEE NOTE.    Note: Microscopic examination reveals a specimen that extends into the superficial dermis. There are nested and single melanocytes along the dermal-epidermal junction some of which have moderately to severely enlarged nuclei and moderate cytoplasm. There is occasional pagetoid extension. There are occasional similar-appearing nests of melanocytes in the dermis in addition to smaller melanocytes. The epidermal melanocytes stain in greater than seventy-five percent with antibodies against PRAME and there are some superficial melanocytes that stain with antibodies against PRAME. These melanocytes stain with antibodies against p16. A SOX-10 stain reveals a non-continuous proliferation with occasional pagetoid extension. All control slides stain appropriately.    These findings are suggestive of a malignant melanoma with a Breslow thickness  of 0.4 mm in association                           with a compound nevus.     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    Note: A conservative complete re-excision is recommended.      ** Electronically signed out by Bouchra Ayala MD **       •   06/24/2025    Final                    Value:By the signature on this report, the individual or group listed as making the Final Interpretation/Diagnosis certifies that they have reviewed this case.      • Clinical History 06/24/2025    Final                    Value:Encounter Diagnosis: Neoplasm of uncertain behavior of skin       [id number]  Collection Comments: Differential Diagnosis: compound nevus vs atypical nevus vs melanoma; possibly on immunotherapy ([id number] trial, unk which arm)  Check Margins Yes/No?:    Comments:    Dermpath Lab: Routine Histopathology (formalin-fixed tissue)  Finding Region: Right Thigh - Anterior  Specimen Objective: 0.3 cm pink shiny papule with irregular brown macules    [id number]  Collection Comments: Differential Diagnosis: compound nevus vs atypical nevus vs melanoma; possibly on immunotherapy ([id number] trial, unk which arm)  Check Margins Yes/No?:    Comments:    Dermpath Lab: Routine Histopathology (formalin-fixed tissue)  Finding Region: Left Thigh - Posterior  Specimen Objective: 0.8x0.4 cm pink shiny papule with irregular brown macules     • Microscopic Description 06/24/2025    Final                    Value:B. The melanocytes do not stain with antibodies against PRAME. All control slides stain appropriately.       • Disclaimer 06/24/2025    Final                    Value:One or more of the reagents used to perform assays on this specimen MAY have contained components considered to be analyte specific reagents (ASR's).  ASR's have not been cleared or approved by the U.S. Food and Drug Administration.  These assays were developed and their performance  characteristics determined by the Department of Pathology at [facility]. The FDA does not require this test to go through premarket FDA review. This test is used for clinical purposes. It should not be regarded as investigational or for research. This laboratory is certified under the Clinical Laboratory Improvement Amendments (CLIA) as qualified to perform high complexity clinical laboratory testing.  The assays were performed with appropriate positive and negative controls which stained appropriately.     • Gross Description 06/24/2025    Final                    Value:A:  Received in formalin is a 7 x 6 x 1 mm piece of skin.  The specimen was bisected.  It is tan in color.  It is shave in shape.  It was embedded in toto.  The specimen was inked.        B:  Received in formalin is a 9 x 6 x 3 mm piece of skin.  The specimen was bisected.  It is tan and brown in color.  It is shave in shape.  It was embedded in toto.  The specimen was inked.      The specimen was grossed by Kamilla Cobb.         • Case Summary Report 06/24/2025    Final                    Value:MELANOMA OF THE SKIN: Biopsy                            MELANOMA OF THE SKIN: BIOPSY - A                            8th Edition - Protocol posted: 3/23/2022                                                        SPECIMEN                               Procedure:    Biopsy, shave                                Specimen Laterality:    Right                                                         TUMOR                               Tumor Site:    Skin of lower limb and hip: Right thigh-anterior                                 Histologic Type:    Superficial spreading melanoma (low-cumulative sun damage (CSD) melanoma)                                Maximum Tumor (Breslow) Thickness (Millimeters):    0.4 mm                               Ulceration:    Not identified                                Anatomic (Flex) Level:     III (melanoma fills and expands papillary dermis)                                Mitotic Rate:    None identified                                Microsatellite(s):    Not identified                                Lymphovascular Invasion:    Not identified                                Neurotropism:    Not identified                                Tumor-Infiltrating Lymphocytes:    Not identified                                Tumor Regression:    Not identified                                MARGINS:                                     Margin Status for Invasive Melanoma:    All margins negative for invasive melanoma                                  Margin Status for Melanoma in situ:    All margins negative for melanoma in situ                                PATHOLOGIC STAGE CLASSIFICATION (pTNM, AJCC 8th Edition):                                     pT Category:    pT1a      Lab on 06/13/2025   Component Date Value Ref Range Status   • WBC 06/13/2025 7.2  4.4 - 11.3 x10*3/uL Final   • nRBC 06/13/2025 0.0  0.0 - 0.0 /100 WBCs Final   • RBC 06/13/2025 4.55  4.00 - 5.20 x10*6/uL Final   • Hemoglobin 06/13/2025 13.5  12.0 - 16.0 g/dL Final   • Hematocrit 06/13/2025 40.4  36.0 - 46.0 % Final   • MCV 06/13/2025 89  80 - 100 fL Final   • MCH 06/13/2025 29.7  26.0 - 34.0 pg Final   • MCHC 06/13/2025 33.4  32.0 - 36.0 g/dL Final   • RDW 06/13/2025 12.4  11.5 - 14.5 % Final   • Platelets 06/13/2025 264  150 - 450 x10*3/uL Final   • Neutrophils % 06/13/2025 70.4  40.0 - 80.0 % Final   • Immature Granulocytes %, Automated 06/13/2025 0.3  0.0 - 0.9 % Final   • Lymphocytes % 06/13/2025 21.5  13.0 - 44.0 % Final   • Monocytes % 06/13/2025 6.4  2.0 - 10.0 % Final   • Eosinophils % 06/13/2025 1.0  0.0 - 6.0 % Final   • Basophils % 06/13/2025 0.4  0.0 - 2.0 % Final   • Neutrophils Absolute 06/13/2025 5.03  1.20 - 7.70 x10*3/uL Final   • Immature Granulocytes Absolute, Au* 06/13/2025 0.02  0.00 - 0.70 x10*3/uL Final   • Lymphocytes  Absolute 06/13/2025 1.54  1.20 - 4.80 x10*3/uL Final   • Monocytes Absolute 06/13/2025 0.46  0.10 - 1.00 x10*3/uL Final   • Eosinophils Absolute 06/13/2025 0.07  0.00 - 0.70 x10*3/uL Final   • Basophils Absolute 06/13/2025 0.03  0.00 - 0.10 x10*3/uL Final   • Glucose 06/13/2025 93  74 - 99 mg/dL Final   • Sodium 06/13/2025 138  136 - 145 mmol/L Final   • Potassium 06/13/2025 4.1  3.5 - 5.3 mmol/L Final   • Chloride 06/13/2025 104  98 - 107 mmol/L Final   • Bicarbonate 06/13/2025 26  21 - 32 mmol/L Final   • Anion Gap 06/13/2025 12  10 - 20 mmol/L Final   • Urea Nitrogen 06/13/2025 10  6 - 23 mg/dL Final   • Creatinine 06/13/2025 0.73  0.50 - 1.05 mg/dL Final   • eGFR 06/13/2025 >90  >60 mL/min/1.73m*2 Final   • Calcium 06/13/2025 9.1  8.6 - 10.3 mg/dL Final   • Albumin 06/13/2025 4.2  3.4 - 5.0 g/dL Final   • Alkaline Phosphatase 06/13/2025 71  33 - 110 U/L Final   • Total Protein 06/13/2025 7.2  6.4 - 8.2 g/dL Final   • AST 06/13/2025 19  9 - 39 U/L Final   • Bilirubin, Total 06/13/2025 0.9  0.0 - 1.2 mg/dL Final   • ALT 06/13/2025 18  7 - 45 U/L Final   • HCG, Beta-Quantitative 06/13/2025 <3  <5 mIU/mL Final   • aPTT 06/13/2025 26  26 - 36 seconds Final   • Creatine Kinase 06/13/2025 90  0 - 215 U/L Final   • C-Reactive Protein 06/13/2025 0.27  <1.00 mg/dL Final   • Ferritin 06/13/2025 15  8 - 150 ng/mL Final   • LDH 06/13/2025 133  84 - 246 U/L Final   • Magnesium 06/13/2025 2.23  1.60 - 2.40 mg/dL Final   • Protime 06/13/2025 10.3  9.8 - 12.4 seconds Final   • INR 06/13/2025 0.9  0.9 - 1.1 Final   • Uric Acid 06/13/2025 3.3  2.3 - 6.7 mg/dL Final   • Color, Urine 06/13/2025 Yellow  Light-Yellow, Yellow, Dark-Yellow Final   • Appearance, Urine 06/13/2025 Clear  Clear Final   • Specific Gravity, Urine 06/13/2025 1.024  1.005 - 1.035 Final   • pH, Urine 06/13/2025 6.0  5.0, 5.5, 6.0, 6.5, 7.0, 7.5, 8.0 Final   • Protein, Urine 06/13/2025 NEGATIVE  NEGATIVE, 10 (TRACE), 20 (TRACE) mg/dL Final   • Glucose, Urine  06/13/2025 Normal  Normal mg/dL Final   • Blood, Urine 06/13/2025 1.0 (3+) (A)  NEGATIVE mg/dL Final   • Ketones, Urine 06/13/2025 NEGATIVE  NEGATIVE mg/dL Final   • Bilirubin, Urine 06/13/2025 NEGATIVE  NEGATIVE mg/dL Final   • Urobilinogen, Urine 06/13/2025 Normal  Normal mg/dL Final   • Nitrite, Urine 06/13/2025 NEGATIVE  NEGATIVE Final   • Leukocyte Esterase, Urine 06/13/2025 NEGATIVE  NEGATIVE Final   • WBC, Urine 06/13/2025 1-5  1-5, NONE /HPF Final   • RBC, Urine 06/13/2025 3-5  NONE, 1-2, 3-5 /HPF Final   • Squamous Epithelial Cells, Urine 06/13/2025 1-9 (SPARSE)  Reference range not established. /HPF Final   • Mucus, Urine 06/13/2025 FEW  Reference range not established. /LPF Final         Radiology Result:  I have reviewed the latest Imaging in PACS and the findings are noted in this note. I discussed the results of the latest imaging with the patient. All previous imaging were reviewed at the time it was completed. Full records are available in the EMR for review as well.     CT soft tissue neck w IV contrast  Result Date: 6/27/2025  Impression: * There is no evidence of mass, cyst or adenopathy in the neck.   MACRO: none   Signed by: Rafael Mcleod 6/27/2025 11:00 AM Dictation workstation:   [id number]    CT chest abdomen pelvis w IV contrast  Result Date: 6/26/2025  Impression: Restaging scan for scalp melanoma with CT comparison dated 04/01/2025. 1. No evidence of metastatic disease in the chest, abdomen, or pelvis. 2. Stable chronic findings as above.   I personally reviewed the images/study and I agree with the findings as stated by Femi Beard MD. This study was interpreted at [facility], [place], [place].   MACRO: None   Signed by: Alexander Saravia 6/26/2025 4:04 PM Dictation workstation:   [id number]        Pathology Results:  I have reviewed the full pathology report recorded in the EMR. The pertinent portions indicating diagnosis are listed here in  the note. for details please refer to the full report recorded in the EMR.    Dermatopathology- DERM LAB: [id number]  Order: [id number]   Collected 1/22/2025 11:41       Status: Edited Result - FINAL       Visible to patient: Yes (seen)       Dx: Malignant melanoma of scalp (Multi)    0 Result Notes      Component    FINAL DIAGNOSIS   SKIN, LEFT CENTRAL FRONTAL SCALP, EXCISION:  MALIGNANT MELANOMA, BRESLOW THICKNESS AT LEAST 3 MM, PRESENT ON THE DEEP MARGIN, SEE NOTE.     Note: Microscopic examination reveals a specimen that extends into the subcutaneous fat. In the superficial and deep dermis there are nests and fascicles of atypical epithelioid and spindled cells that focally appear to involve the deep margin. There appears to be processing artifact from previous freezing which makes the interpretation of the mitotic rate challenging.     ** Electronically signed out by Bouchra Ayala MD **      Electronically signed by Bouchra Ayala MD on 1/27/2025 at 1111        Micrographic Surgery Details:  Post-operative length (cm): 1.8  Post-operative width (cm): 1.7  Number of Mohs stages: 1  Indication for sending permanent sections: evaluate a debulking specimen     Stage 1     Comments: The patient was brought into the operating room and placed in the procedure chair in the appropriate position.  The area positive by previous biopsy was identified and confirmed with the patient. The area of clinically obvious tumor was debulked using a curette and/or scalpel as needed. An incision was made following the Mohs approach through the skin. The specimen was taken to the lab, divided into 3 piece(s) and appropriately chromacoded and processed.        Debulk:  5mm (will send for permanent)              Tumor features identified on Mohs section: no tumor identified      Depth of invasion: subcutaneous fat     Depth of defect: subcutaneous fat     Patient tolerance of procedure: tolerated well, no immediate complications     Assessment  and Plan:   Assessment/Plan     Ms. Tasia Garcia is a 39 y.o. female with a diagnosis of stage IIb melanoma of the scalp. Please see the evolution of the case listed above in the cancer history.     Today,   I reviewed the CT scan images independently. There is no evidence of recurrence. She has tolerated treatment well so far. The new melanoma is very small and less than 0.4mm. It will be managed by dermatology only.     # Stage 2B melanoma of scalp  - Continue on REGE 2623 protocol. Cycle 5 today.      DISCLAIMER:   In preparing for this visit and writing this note, I reviewed all the previous electronic medical records (labs, imaging and medical charts) of the patient available in the physician portal. Significant findings which helped in decision making are recorded  in this chart.     The plan was discussed with the patient. We gave him ample opportunities to ask questions. All questions were answered to his satisfaction and he verbalized understanding.      Wes Veloz MD  Associate Professor of Medicine  Division of Hematology and Oncology  [employer]    Co-Director Sarcoma and Cutaneous Oncology  [facility]    Phone: [phone]  Fax: [phone]

## 2025-07-09 NOTE — PROGRESS NOTES
.Patient ID: Tasia Garcia is a 39 y.o. female.  Referring Physician: Wes Veloz MD  [id number] [place] [address]  Primary Care Provider: Arya Ku MD     Chief Complaint   Patient presents with    Follow-up     Immunotherapy follow-up.  Clinical trial follow-up.  Toxicity check    Skin Cancer     Stage III melanoma on clinical trial      Oncology History Overview Note   Ms. Tasia Garcia is diagnosed with stage IIb melanoma of the scalp.  She first presented to ENT oncology in November 2020 for and underwent wide local excision and sentinel lymph node biopsy on 12/5/2024 which showed a 3 mm deep cutaneous melanoma positive on the margins.  The sentinel lymph node was traced to the parotid lymph node which was negative for an deposit.  Since the margin was positive, the patient was referred for Mohs surgery which he completed on 1/22/2025 with Dr. Tevin Anne and a negative margin was achieved.  The final Breslow depth was labeled at 5 mm, no ulceration, no LVI-pT4a melanoma.  PET/CT scan on 1/6/2025 was negative for any distant metastatic deposits.  MRI brain on 2/13/2025 was negative for intracranial deposits. We spoke to the patient and she agreed to participate in REGE 2623.  She started adjuvant treatment on 4/8/2025.  Restaging CT scan on 6/26/2025 did not show any evidence of recurrence.  A small suspicious lesion was removed from the right outer thigh on 6/24/2025 which was read as atypical melanocytic neoplasm.  Since the CT scan right after removal of this lesion did not show any metastatic disease, further workup was not recommended.     Malignant melanoma of scalp (Multi)   11/15/2024 Initial Diagnosis    Referring Surgeon: Enzo Russell and Tevin Anne   Dermatologist: Rosio Velazquez   Date of first meeting with our team: 02/12/2025    Date of First meeting with surgical team: 11/12/2024  Melanoma type: Cutaneous  Location of primary site: Scalp-primary  dermal  Date of WLE and SLNB: Initial resection on 12/5/2024 (Dr. Madelin Russell) followed by Mohs surgery (Dr. Tevin Anne) to clear margins on 1/22/25.  Final pathology: Breslow Depth-5 mm; Ulceration status-negative; LVI-negative; Other high risk features-negative  [place] lymph node status: 1 node examined in the parotid gland-found to be negative for malignancy  Final Stage: pT4a N0 M0-stage IIb melanoma- AJCC 8th Ed.    BRAF status: Not known  Initial MRI brain with and without contrast: 2/13/2025-unremarkable  Initial Full body PET scan: 01/6/2025-unremarkable       1/1/2025 Cancer Staged    Staging form: Melanoma of the Skin, AJCC 8th Edition, Pathologic stage from 1/1/2025: Stage IIB (pT4a, pN0, cM0) - Signed by Wes Veloz MD on 2/13/2025 4/8/2025 -  Research Study Participant    ([facility]) [id number] Arms A, B, & C - Fianlimab / Cemiplimab or Pembrolizumab, 21 Day Cycles  Plan Provider: BEN Prado-CNP  Treatment goal: Curative  Line of treatment: Adjuvant  Associated studies: Fianlimab and Cemiplimab Versus Pembrolizumab in Resected High-risk Melanoma        Ms. Tasia Garcia is diagnosed with stage IIB melanoma- (nT2nT0A6)- AJCC 8th Ed. She initially underwent wide local excision on 12/5/24 with Dr. Madelin Russell, but the margin was positive. She was then sent to Dr. Tevin Anne for Mohs surgery which was done on 1/22/25 and the margin was cleared. The sentinel nodes were negative. She met with medical oncology and decided to participate in the REGE 2623 trial.  The patient started adjuvant immunotherapy on 4/8/2025.  She had finished first 4 cycles without any event.  First CT scan done on 6/26/2025 did not show any evidence of recurrence.    Of note, the patient had a suspicious lesion removed from the right outer thigh on 6/24/2025 which returned back as atypical melanocytic neoplasm on pathologic examination however deemed to be malignant melanoma with a Breslow thickness of  "0.4 mm.  This was separate from the primary melanoma which was present on the head and neck region.    Systemic treatment  A. [id number] Arms A, B, & C - Fianlimab / Cemiplimab or Pembrolizumab, 21 Day Cycles   C1: 04.08.25  C2: 04.29.25  C3: 05.19.25  C4: 06.13.25  C5: 07.08.25      Subjective   Please refer to \"Notes/Cancer History\" above for complete History of present illness.     Ms. Tasia Garcia has done wel she does not report any signs or symptoms suggestive of immunotherapy related toxicity so far. She wants to understand the pathology report from the recent excision.      Review of Systems:   Review of Systems   Constitutional:  Negative for appetite change, chills, fatigue and fever.   HENT:   Negative for hearing loss, lump/mass, mouth sores, sore throat and trouble swallowing.    Eyes:  Negative for eye problems and icterus.   Respiratory:  Negative for chest tightness, cough, hemoptysis and shortness of breath.    Cardiovascular:  Negative for chest pain, leg swelling and palpitations.   Gastrointestinal:  Negative for abdominal pain, constipation, diarrhea, nausea and vomiting.   Endocrine: Negative for hot flashes.   Genitourinary:  Negative for difficulty urinating, dysuria, frequency and hematuria.    Musculoskeletal:  Negative for back pain, flank pain, gait problem and myalgias.   Skin:  Negative for itching and rash.   Neurological:  Negative for dizziness, extremity weakness, gait problem and seizures.   Hematological:  Negative for adenopathy.   Psychiatric/Behavioral:  Negative for confusion and depression. The patient is not nervous/anxious.          MEDICAL HISTORY  Past Medical History:   Diagnosis Date    Dental disease 07/2024    Melanoma of scalp (Multi)     Personal history of other diseases of the female genital tract     History of infertility       FAMILY HISTORY  Family History   Problem Relation Name Age of Onset    Other (cervical carcinoma) Mother Kimberly michaels     " Cancer Mother Kimberly fallon     Stroke Mother Kimberly fallon     Ovarian cancer Mother Kimberly fallon 45    Cerebral palsy Sister      Breast cancer Paternal Grandmother Laura Fallon         AGE UNKNOWN       TOBACCO HISTORY  Tobacco Use: Low Risk  (7/8/2025)    Patient History     Smoking Tobacco Use: Never     Smokeless Tobacco Use: Never     Passive Exposure: Never       SOCIAL HISTORY  Social Connections: Moderately Integrated (2/12/2025)    Social Connection and Isolation Panel [NHANES]     Frequency of Communication with Friends and Family: More than three times a week     Frequency of Social Gatherings with Friends and Family: More than three times a week     Attends Jew Services: More than 4 times per year     Active Member of Clubs or Organizations: Yes     Attends Club or Organization Meetings: More than 4 times per year     Marital Status:    Recent Concern: Social Connections - At Risk (1/25/2025)    Received from [employer]     How often do you see or talk to people that you care about and feel close to? (For example: talking to friends on phone, visiting friends or family, going to Taoist or club meetings): 2        Outpatient Medication Profile:  Current Outpatient Medications on File Prior to Visit   Medication Sig Dispense Refill    norgestrel (Opill) 0.075 mg tablet Take 1 tablet by mouth once daily. This is not used for an Emergency Contraceptive but taken daily 28 each 2    prenatal vit,calc76-iron-folic (Prenatabs Rx) 29 mg iron- 1 mg tablet Take 1 tablet by mouth once daily.       No current facility-administered medications on file prior to visit.         Performance Status:  Asymptomatic     Vitals and Measurements:   /72   Pulse 71   Temp 36.3 °C (97.3 °F) (Core)   Resp 20   Wt 92.8 kg (204 lb 9.4 oz)   SpO2 100%   BMI 38.03 kg/m²       Physical Exam:   Physical Exam  Constitutional:       General: She is awake.      Appearance: Normal appearance. She  is well-developed.   HENT:      Head: Normocephalic and atraumatic.   Eyes:      General: Lids are normal. Vision grossly intact.   Neck:      Thyroid: No thyroid mass.   Cardiovascular:      Rate and Rhythm: Normal rate and regular rhythm.      Heart sounds: Normal heart sounds, S1 normal and S2 normal.   Pulmonary:      Effort: Pulmonary effort is normal.      Breath sounds: Normal breath sounds and air entry. No decreased breath sounds.   Abdominal:      General: Abdomen is flat. Bowel sounds are normal.      Palpations: Abdomen is soft.      Tenderness: There is no abdominal tenderness.   Musculoskeletal:      Cervical back: Normal range of motion and neck supple. No edema or rigidity.   Lymphadenopathy:      Upper Body:      Right upper body: No axillary adenopathy.      Lower Body: No right inguinal adenopathy. No left inguinal adenopathy.   Skin:     General: Skin is warm and moist.      Capillary Refill: Capillary refill takes less than 2 seconds.   Neurological:      Mental Status: She is alert and oriented to person, place, and time.      Cranial Nerves: Cranial nerves 2-12 are intact.      Sensory: Sensation is intact.      Motor: Motor function is intact.   Psychiatric:         Attention and Perception: Attention normal.         Mood and Affect: Mood and affect normal.         Speech: Speech normal.         Behavior: Behavior is cooperative.              Lab Results:  I have reviewed these laboratory results:     Lab on 07/08/2025   Component Date Value Ref Range Status    WBC 07/08/2025 6.1  4.4 - 11.3 x10*3/uL Final    nRBC 07/08/2025 0.0  0.0 - 0.0 /100 WBCs Final    RBC 07/08/2025 4.47  4.00 - 5.20 x10*6/uL Final    Hemoglobin 07/08/2025 13.1  12.0 - 16.0 g/dL Final    Hematocrit 07/08/2025 39.5  36.0 - 46.0 % Final    MCV 07/08/2025 88  80 - 100 fL Final    MCH 07/08/2025 29.3  26.0 - 34.0 pg Final    MCHC 07/08/2025 33.2  32.0 - 36.0 g/dL Final    RDW 07/08/2025 12.5  11.5 - 14.5 % Final     Platelets 07/08/2025 292  150 - 450 x10*3/uL Final    Neutrophils % 07/08/2025 65.9  40.0 - 80.0 % Final    Immature Granulocytes %, Automated 07/08/2025 0.2  0.0 - 0.9 % Final    Lymphocytes % 07/08/2025 26.1  13.0 - 44.0 % Final    Monocytes % 07/08/2025 6.7  2.0 - 10.0 % Final    Eosinophils % 07/08/2025 0.8  0.0 - 6.0 % Final    Basophils % 07/08/2025 0.3  0.0 - 2.0 % Final    Neutrophils Absolute 07/08/2025 4.04  1.20 - 7.70 x10*3/uL Final    Immature Granulocytes Absolute, Au* 07/08/2025 0.01  0.00 - 0.70 x10*3/uL Final    Lymphocytes Absolute 07/08/2025 1.60  1.20 - 4.80 x10*3/uL Final    Monocytes Absolute 07/08/2025 0.41  0.10 - 1.00 x10*3/uL Final    Eosinophils Absolute 07/08/2025 0.05  0.00 - 0.70 x10*3/uL Final    Basophils Absolute 07/08/2025 0.02  0.00 - 0.10 x10*3/uL Final    Glucose 07/08/2025 99  74 - 99 mg/dL Final    Sodium 07/08/2025 135 (L)  136 - 145 mmol/L Final    Potassium 07/08/2025 4.1  3.5 - 5.3 mmol/L Final    Chloride 07/08/2025 102  98 - 107 mmol/L Final    Bicarbonate 07/08/2025 26  21 - 32 mmol/L Final    Anion Gap 07/08/2025 11  10 - 20 mmol/L Final    Urea Nitrogen 07/08/2025 15  6 - 23 mg/dL Final    Creatinine 07/08/2025 0.68  0.50 - 1.05 mg/dL Final    eGFR 07/08/2025 >90  >60 mL/min/1.73m*2 Final    Calcium 07/08/2025 9.3  8.6 - 10.3 mg/dL Final    Albumin 07/08/2025 4.1  3.4 - 5.0 g/dL Final    Alkaline Phosphatase 07/08/2025 67  33 - 110 U/L Final    Total Protein 07/08/2025 7.5  6.4 - 8.2 g/dL Final    AST 07/08/2025 19  9 - 39 U/L Final    Bilirubin, Total 07/08/2025 0.9  0.0 - 1.2 mg/dL Final    ALT 07/08/2025 16  7 - 45 U/L Final    HCG, Beta-Quantitative 07/08/2025 <3  <5 mIU/mL Final    aPTT 07/08/2025 27  26 - 36 seconds Final    Cortisol 07/08/2025 11.4  2.5 - 20.0 ug/dL Final    Creatine Kinase 07/08/2025 81  0 - 215 U/L Final    C-Reactive Protein 07/08/2025 0.23  <1.00 mg/dL Final    Ferritin 07/08/2025 15  8 - 150 ng/mL Final    LDH 07/08/2025 128  84 - 246 U/L  Final    Magnesium 07/08/2025 2.08  1.60 - 2.40 mg/dL Final    Protime 07/08/2025 9.7 (L)  9.8 - 12.4 seconds Final    INR 07/08/2025 0.9  0.9 - 1.1 Final    Thyroid Stimulating Hormone 07/08/2025 1.11  0.44 - 3.98 mIU/L Final    Thyroxine, Free 07/08/2025 1.25  0.78 - 1.48 ng/dL Final    Uric Acid 07/08/2025 2.6  2.3 - 6.7 mg/dL Final    Color, Urine 07/08/2025 Light-Yellow  Light-Yellow, Yellow, Dark-Yellow Final    Appearance, Urine 07/08/2025 Clear  Clear Final    Specific Gravity, Urine 07/08/2025 1.024  1.005 - 1.035 Final    pH, Urine 07/08/2025 5.5  5.0, 5.5, 6.0, 6.5, 7.0, 7.5, 8.0 Final    Protein, Urine 07/08/2025 NEGATIVE  NEGATIVE, 10 (TRACE), 20 (TRACE) mg/dL Final    Glucose, Urine 07/08/2025 Normal  Normal mg/dL Final    Blood, Urine 07/08/2025 0.06 (1+) (A)  NEGATIVE mg/dL Final    Ketones, Urine 07/08/2025 NEGATIVE  NEGATIVE mg/dL Final    Bilirubin, Urine 07/08/2025 NEGATIVE  NEGATIVE mg/dL Final    Urobilinogen, Urine 07/08/2025 Normal  Normal mg/dL Final    Nitrite, Urine 07/08/2025 NEGATIVE  NEGATIVE Final    Leukocyte Esterase, Urine 07/08/2025 NEGATIVE  NEGATIVE Final    WBC, Urine 07/08/2025 NONE  1-5, NONE /HPF Final    RBC, Urine 07/08/2025 1-2  NONE, 1-2, 3-5 /HPF Final    Squamous Epithelial Cells, Urine 07/08/2025 1-9 (SPARSE)  Reference range not established. /HPF Final    Mucus, Urine 07/08/2025 FEW  Reference range not established. /LPF Final   Office Visit on 06/24/2025   Component Date Value Ref Range Status    Case Report 06/24/2025    Final                    Value:Dermatopathology                                  Case: [id number]                                   Authorizing Provider:  Madhavi Andrea MD       Collected:           06/24/2025 1135              Ordering Location[facility]     [facility]       Received:            06/24/2025 1612              Pathologist:           Bouchra Ayala MD                                                             Specimens:   A) -  SKIN, Right Thigh - Anterior                                                                   B) - SKIN, Left Thigh - Posterior                                                          FINAL DIAGNOSIS 06/24/2025    Final                    Value:A: SKIN, RIGHT THIGH - ANTERIOR, SHAVE EXCISION:  ATYPICAL COMPOUND MELANOCYTIC NEOPLASM SUGGESTIVE OF MELANOMA, SEE NOTE.    Note: Microscopic examination reveals a specimen that extends into the superficial dermis. There are nested and single melanocytes along the dermal-epidermal junction some of which have moderately to severely enlarged nuclei and moderate cytoplasm. There is occasional pagetoid extension. There are occasional similar-appearing nests of melanocytes in the dermis in addition to smaller melanocytes. The epidermal melanocytes stain in greater than seventy-five percent with antibodies against PRAME and there are some superficial melanocytes that stain with antibodies against PRAME. These melanocytes stain with antibodies against p16. A SOX-10 stain reveals a non-continuous proliferation with occasional pagetoid extension. All control slides stain appropriately.    These findings are suggestive of a malignant melanoma with a Breslow thickness of 0.4 mm in association                           with a compound nevus.     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    Note: A conservative complete re-excision is recommended.      ** Electronically signed out by Bouchra Ayala MD **          06/24/2025    Final                    Value:By the signature on this report, the individual or group listed as making the Final Interpretation/Diagnosis certifies that they have reviewed this case.       Clinical History 06/24/2025    Final                    Value:Encounter Diagnosis: Neoplasm of uncertain behavior of skin       [id number]  Collection Comments: Differential Diagnosis: compound nevus  vs atypical nevus vs melanoma; possibly on immunotherapy ([id number] trial, unk which arm)  Check Margins Yes/No?:    Comments:    Dermpath Lab: Routine Histopathology (formalin-fixed tissue)  Finding Region: Right Thigh - Anterior  Specimen Objective: 0.3 cm pink shiny papule with irregular brown macules    [id number]  Collection Comments: Differential Diagnosis: compound nevus vs atypical nevus vs melanoma; possibly on immunotherapy ([id number] trial, unk which arm)  Check Margins Yes/No?:    Comments:    Dermpath Lab: Routine Histopathology (formalin-fixed tissue)  Finding Region: Left Thigh - Posterior  Specimen Objective: 0.8x0.4 cm pink shiny papule with irregular brown macules      Microscopic Description 06/24/2025    Final                    Value:B. The melanocytes do not stain with antibodies against PRAME. All control slides stain appropriately.        Disclaimer 06/24/2025    Final                    Value:One or more of the reagents used to perform assays on this specimen MAY have contained components considered to be analyte specific reagents (ASR's).  ASR's have not been cleared or approved by the U.S. Food and Drug Administration.  These assays were developed and their performance characteristics determined by the Department of Pathology at [facility]. The FDA does not require this test to go through premarket FDA review. This test is used for clinical purposes. It should not be regarded as investigational or for research. This laboratory is certified under the Clinical Laboratory Improvement Amendments (CLIA) as qualified to perform high complexity clinical laboratory testing.  The assays were performed with appropriate positive and negative controls which stained appropriately.      Gross Description 06/24/2025    Final                    Value:A:  Received in formalin is a 7 x 6 x 1 mm piece of skin.  The specimen was bisected.  It is tan in color.  It is shave in  shape.  It was embedded in toto.  The specimen was inked.        B:  Received in formalin is a 9 x 6 x 3 mm piece of skin.  The specimen was bisected.  It is tan and brown in color.  It is shave in shape.  It was embedded in toto.  The specimen was inked.      The specimen was grossed by Kamilla Cobb.          Case Summary Report 06/24/2025    Final                    Value:MELANOMA OF THE SKIN: Biopsy                            MELANOMA OF THE SKIN: BIOPSY - A                            8th Edition - Protocol posted: 3/23/2022                                                        SPECIMEN                               Procedure:    Biopsy, shave                                Specimen Laterality:    Right                                                         TUMOR                               Tumor Site:    Skin of lower limb and hip: Right thigh-anterior                                 Histologic Type:    Superficial spreading melanoma (low-cumulative sun damage (CSD) melanoma)                                Maximum Tumor (Breslow) Thickness (Millimeters):    0.4 mm                               Ulceration:    Not identified                                Anatomic (Flex) Level:    III (melanoma fills and expands papillary dermis)                                Mitotic Rate:    None identified                                Microsatellite(s):    Not identified                                Lymphovascular Invasion:    Not identified                                Neurotropism:    Not identified                                Tumor-Infiltrating Lymphocytes:    Not identified                                Tumor Regression:    Not identified                                MARGINS:                                     Margin Status for Invasive Melanoma:    All margins negative for invasive melanoma                                  Margin Status for Melanoma in situ:    All margins negative for melanoma in situ                                 PATHOLOGIC STAGE CLASSIFICATION (pTNM, AJCC 8th Edition):                                     pT Category:    pT1a      Lab on 06/13/2025   Component Date Value Ref Range Status    WBC 06/13/2025 7.2  4.4 - 11.3 x10*3/uL Final    nRBC 06/13/2025 0.0  0.0 - 0.0 /100 WBCs Final    RBC 06/13/2025 4.55  4.00 - 5.20 x10*6/uL Final    Hemoglobin 06/13/2025 13.5  12.0 - 16.0 g/dL Final    Hematocrit 06/13/2025 40.4  36.0 - 46.0 % Final    MCV 06/13/2025 89  80 - 100 fL Final    MCH 06/13/2025 29.7  26.0 - 34.0 pg Final    MCHC 06/13/2025 33.4  32.0 - 36.0 g/dL Final    RDW 06/13/2025 12.4  11.5 - 14.5 % Final    Platelets 06/13/2025 264  150 - 450 x10*3/uL Final    Neutrophils % 06/13/2025 70.4  40.0 - 80.0 % Final    Immature Granulocytes %, Automated 06/13/2025 0.3  0.0 - 0.9 % Final    Lymphocytes % 06/13/2025 21.5  13.0 - 44.0 % Final    Monocytes % 06/13/2025 6.4  2.0 - 10.0 % Final    Eosinophils % 06/13/2025 1.0  0.0 - 6.0 % Final    Basophils % 06/13/2025 0.4  0.0 - 2.0 % Final    Neutrophils Absolute 06/13/2025 5.03  1.20 - 7.70 x10*3/uL Final    Immature Granulocytes Absolute, Au* 06/13/2025 0.02  0.00 - 0.70 x10*3/uL Final    Lymphocytes Absolute 06/13/2025 1.54  1.20 - 4.80 x10*3/uL Final    Monocytes Absolute 06/13/2025 0.46  0.10 - 1.00 x10*3/uL Final    Eosinophils Absolute 06/13/2025 0.07  0.00 - 0.70 x10*3/uL Final    Basophils Absolute 06/13/2025 0.03  0.00 - 0.10 x10*3/uL Final    Glucose 06/13/2025 93  74 - 99 mg/dL Final    Sodium 06/13/2025 138  136 - 145 mmol/L Final    Potassium 06/13/2025 4.1  3.5 - 5.3 mmol/L Final    Chloride 06/13/2025 104  98 - 107 mmol/L Final    Bicarbonate 06/13/2025 26  21 - 32 mmol/L Final    Anion Gap 06/13/2025 12  10 - 20 mmol/L Final    Urea Nitrogen 06/13/2025 10  6 - 23 mg/dL Final    Creatinine 06/13/2025 0.73  0.50 - 1.05 mg/dL Final    eGFR 06/13/2025 >90  >60 mL/min/1.73m*2 Final    Calcium 06/13/2025 9.1  8.6 - 10.3 mg/dL Final     Albumin 06/13/2025 4.2  3.4 - 5.0 g/dL Final    Alkaline Phosphatase 06/13/2025 71  33 - 110 U/L Final    Total Protein 06/13/2025 7.2  6.4 - 8.2 g/dL Final    AST 06/13/2025 19  9 - 39 U/L Final    Bilirubin, Total 06/13/2025 0.9  0.0 - 1.2 mg/dL Final    ALT 06/13/2025 18  7 - 45 U/L Final    HCG, Beta-Quantitative 06/13/2025 <3  <5 mIU/mL Final    aPTT 06/13/2025 26  26 - 36 seconds Final    Creatine Kinase 06/13/2025 90  0 - 215 U/L Final    C-Reactive Protein 06/13/2025 0.27  <1.00 mg/dL Final    Ferritin 06/13/2025 15  8 - 150 ng/mL Final    LDH 06/13/2025 133  84 - 246 U/L Final    Magnesium 06/13/2025 2.23  1.60 - 2.40 mg/dL Final    Protime 06/13/2025 10.3  9.8 - 12.4 seconds Final    INR 06/13/2025 0.9  0.9 - 1.1 Final    Uric Acid 06/13/2025 3.3  2.3 - 6.7 mg/dL Final    Color, Urine 06/13/2025 Yellow  Light-Yellow, Yellow, Dark-Yellow Final    Appearance, Urine 06/13/2025 Clear  Clear Final    Specific Gravity, Urine 06/13/2025 1.024  1.005 - 1.035 Final    pH, Urine 06/13/2025 6.0  5.0, 5.5, 6.0, 6.5, 7.0, 7.5, 8.0 Final    Protein, Urine 06/13/2025 NEGATIVE  NEGATIVE, 10 (TRACE), 20 (TRACE) mg/dL Final    Glucose, Urine 06/13/2025 Normal  Normal mg/dL Final    Blood, Urine 06/13/2025 1.0 (3+) (A)  NEGATIVE mg/dL Final    Ketones, Urine 06/13/2025 NEGATIVE  NEGATIVE mg/dL Final    Bilirubin, Urine 06/13/2025 NEGATIVE  NEGATIVE mg/dL Final    Urobilinogen, Urine 06/13/2025 Normal  Normal mg/dL Final    Nitrite, Urine 06/13/2025 NEGATIVE  NEGATIVE Final    Leukocyte Esterase, Urine 06/13/2025 NEGATIVE  NEGATIVE Final    WBC, Urine 06/13/2025 1-5  1-5, NONE /HPF Final    RBC, Urine 06/13/2025 3-5  NONE, 1-2, 3-5 /HPF Final    Squamous Epithelial Cells, Urine 06/13/2025 1-9 (SPARSE)  Reference range not established. /HPF Final    Mucus, Urine 06/13/2025 FEW  Reference range not established. /LPF Final         Radiology Result:  I have reviewed the latest Imaging in PACS and the findings are noted in this  note. I discussed the results of the latest imaging with the patient. All previous imaging were reviewed at the time it was completed. Full records are available in the EMR for review as well.     CT soft tissue neck w IV contrast  Result Date: 6/27/2025  Impression: * There is no evidence of mass, cyst or adenopathy in the neck.   MACRO: none   Signed by: Rafael Mcleod 6/27/2025 11:00 AM Dictation workstation:   [id number]    CT chest abdomen pelvis w IV contrast  Result Date: 6/26/2025  Impression: Restaging scan for scalp melanoma with CT comparison dated 04/01/2025. 1. No evidence of metastatic disease in the chest, abdomen, or pelvis. 2. Stable chronic findings as above.   I personally reviewed the images/study and I agree with the findings as stated by Femi Beard MD. This study was interpreted at [place], [place].   MACRO: None   Signed by: Alexander Saravia 6/26/2025 4:04 PM Dictation workstation:   [id number]        Pathology Results:  I have reviewed the full pathology report recorded in the EMR. The pertinent portions indicating diagnosis are listed here in the note. for details please refer to the full report recorded in the EMR.    Dermatopathology- DERM LAB: [id number]  Order: [id number]   Collected 1/22/2025 11:41       Status: Edited Result - FINAL       Visible to patient: Yes (seen)       Dx: Malignant melanoma of scalp (Multi)    0 Result Notes      Component    FINAL DIAGNOSIS   SKIN, LEFT CENTRAL FRONTAL SCALP, EXCISION:  MALIGNANT MELANOMA, BRESLOW THICKNESS AT LEAST 3 MM, PRESENT ON THE DEEP MARGIN, SEE NOTE.     Note: Microscopic examination reveals a specimen that extends into the subcutaneous fat. In the superficial and deep dermis there are nests and fascicles of atypical epithelioid and spindled cells that focally appear to involve the deep margin. There appears to be processing artifact from previous freezing which makes the  interpretation of the mitotic rate challenging.     ** Electronically signed out by Bouchra Ayala MD **      Electronically signed by Bouchra Ayala MD on 1/27/2025 at 1111        Micrographic Surgery Details:  Post-operative length (cm): 1.8  Post-operative width (cm): 1.7  Number of Mohs stages: 1  Indication for sending permanent sections: evaluate a debulking specimen     Stage 1     Comments: The patient was brought into the operating room and placed in the procedure chair in the appropriate position.  The area positive by previous biopsy was identified and confirmed with the patient. The area of clinically obvious tumor was debulked using a curette and/or scalpel as needed. An incision was made following the Mohs approach through the skin. The specimen was taken to the lab, divided into 3 piece(s) and appropriately chromacoded and processed.        Debulk:  5mm (will send for permanent)              Tumor features identified on Mohs section: no tumor identified      Depth of invasion: subcutaneous fat     Depth of defect: subcutaneous fat     Patient tolerance of procedure: tolerated well, no immediate complications     Assessment and Plan:   Assessment/Plan      Ms. Tasia Garcia is a 39 y.o. female with a diagnosis of stage IIb melanoma of the scalp. Please see the evolution of the case listed above in the cancer history.     Today,   I reviewed the CT scan images independently. There is no evidence of recurrence. She has tolerated treatment well so far. The new melanoma is very small and less than 0.4mm. It will be managed by dermatology only.     # Stage 2B melanoma of scalp  - Continue on REGE 2623 protocol. Cycle 5 today.      DISCLAIMER:   In preparing for this visit and writing this note, I reviewed all the previous electronic medical records (labs, imaging and medical charts) of the patient available in the physician portal. Significant findings which helped in decision making are recorded   in this chart.     The plan was discussed with the patient. We gave him ample opportunities to ask questions. All questions were answered to his satisfaction and he verbalized understanding.      Wes Veloz MD  Associate Professor of Medicine  Division of Hematology and Oncology  [facility] [employer]    Co-Director Sarcoma and Cutaneous Oncology  [facility]    Phone: [phone]  Fax: [phone]

## 2025-07-10 LAB — ACTH PLAS-MCNC: 6.8 PG/ML (ref 7.2–63.3)

## 2025-07-21 ENCOUNTER — DOCUMENTATION (OUTPATIENT)
Dept: HEMATOLOGY/ONCOLOGY | Facility: HOSPITAL | Age: 40
End: 2025-07-21
Payer: COMMERCIAL

## 2025-07-22 ENCOUNTER — APPOINTMENT (OUTPATIENT)
Dept: HEMATOLOGY/ONCOLOGY | Facility: HOSPITAL | Age: 40
End: 2025-07-22
Payer: COMMERCIAL

## 2025-07-25 DIAGNOSIS — C43.4 MALIGNANT MELANOMA OF SCALP (MULTI): ICD-10-CM

## 2025-07-25 RX ORDER — NORGESTREL 0.07 MG/1
1 TABLET ORAL DAILY
Qty: 28 EACH | Refills: 11 | Status: SHIPPED | OUTPATIENT
Start: 2025-07-25

## 2025-07-29 ENCOUNTER — OFFICE VISIT (OUTPATIENT)
Dept: HEMATOLOGY/ONCOLOGY | Facility: HOSPITAL | Age: 40
End: 2025-07-29
Payer: COMMERCIAL

## 2025-07-29 ENCOUNTER — INFUSION (OUTPATIENT)
Dept: HEMATOLOGY/ONCOLOGY | Facility: HOSPITAL | Age: 40
End: 2025-07-29
Payer: COMMERCIAL

## 2025-07-29 ENCOUNTER — DOCUMENTATION (OUTPATIENT)
Dept: HEMATOLOGY/ONCOLOGY | Facility: HOSPITAL | Age: 40
End: 2025-07-29

## 2025-07-29 ENCOUNTER — APPOINTMENT (OUTPATIENT)
Dept: HEMATOLOGY/ONCOLOGY | Facility: HOSPITAL | Age: 40
End: 2025-07-29
Payer: COMMERCIAL

## 2025-07-29 ENCOUNTER — LAB (OUTPATIENT)
Dept: LAB | Facility: HOSPITAL | Age: 40
End: 2025-07-29
Payer: COMMERCIAL

## 2025-07-29 VITALS
WEIGHT: 205.5 LBS | HEART RATE: 74 BPM | BODY MASS INDEX: 38.2 KG/M2 | OXYGEN SATURATION: 100 % | DIASTOLIC BLOOD PRESSURE: 74 MMHG | RESPIRATION RATE: 16 BRPM | TEMPERATURE: 98.1 F | SYSTOLIC BLOOD PRESSURE: 130 MMHG

## 2025-07-29 DIAGNOSIS — C43.4 MALIGNANT MELANOMA OF SCALP (MULTI): ICD-10-CM

## 2025-07-29 DIAGNOSIS — Z29.9 PREVENTIVE MEASURE: ICD-10-CM

## 2025-07-29 DIAGNOSIS — Z51.12 ENCOUNTER FOR ANTINEOPLASTIC IMMUNOTHERAPY: ICD-10-CM

## 2025-07-29 DIAGNOSIS — C43.4 MALIGNANT MELANOMA OF SCALP (MULTI): Primary | ICD-10-CM

## 2025-07-29 LAB
ALBUMIN SERPL BCP-MCNC: 3.9 G/DL (ref 3.4–5)
ALP SERPL-CCNC: 69 U/L (ref 33–110)
ALT SERPL W P-5'-P-CCNC: 11 U/L (ref 7–45)
ANION GAP SERPL CALC-SCNC: 10 MMOL/L (ref 10–20)
APPEARANCE UR: CLEAR
APTT PPP: 26 SECONDS (ref 26–36)
AST SERPL W P-5'-P-CCNC: 14 U/L (ref 9–39)
B-HCG SERPL-ACNC: <3 MIU/ML
BASOPHILS # BLD AUTO: 0.02 X10*3/UL (ref 0–0.1)
BASOPHILS NFR BLD AUTO: 0.4 %
BILIRUB SERPL-MCNC: 0.5 MG/DL (ref 0–1.2)
BILIRUB UR STRIP.AUTO-MCNC: NEGATIVE MG/DL
BUN SERPL-MCNC: 10 MG/DL (ref 6–23)
CALCIUM SERPL-MCNC: 8.8 MG/DL (ref 8.6–10.3)
CHLORIDE SERPL-SCNC: 104 MMOL/L (ref 98–107)
CK SERPL-CCNC: 60 U/L (ref 0–215)
CO2 SERPL-SCNC: 26 MMOL/L (ref 21–32)
COLOR UR: NORMAL
CREAT SERPL-MCNC: 0.66 MG/DL (ref 0.5–1.05)
CRP SERPL-MCNC: 0.97 MG/DL
EGFRCR SERPLBLD CKD-EPI 2021: >90 ML/MIN/1.73M*2
EOSINOPHIL # BLD AUTO: 0.14 X10*3/UL (ref 0–0.7)
EOSINOPHIL NFR BLD AUTO: 2.6 %
ERYTHROCYTE [DISTWIDTH] IN BLOOD BY AUTOMATED COUNT: 12.6 % (ref 11.5–14.5)
FERRITIN SERPL-MCNC: 16 NG/ML (ref 8–150)
GLUCOSE SERPL-MCNC: 94 MG/DL (ref 74–99)
GLUCOSE UR STRIP.AUTO-MCNC: NORMAL MG/DL
HCT VFR BLD AUTO: 38.4 % (ref 36–46)
HGB BLD-MCNC: 12.7 G/DL (ref 12–16)
IMM GRANULOCYTES # BLD AUTO: 0.02 X10*3/UL (ref 0–0.7)
IMM GRANULOCYTES NFR BLD AUTO: 0.4 % (ref 0–0.9)
INR PPP: 0.9 (ref 0.9–1.1)
KETONES UR STRIP.AUTO-MCNC: NEGATIVE MG/DL
LDH SERPL L TO P-CCNC: 118 U/L (ref 84–246)
LEUKOCYTE ESTERASE UR QL STRIP.AUTO: NEGATIVE
LYMPHOCYTES # BLD AUTO: 1.27 X10*3/UL (ref 1.2–4.8)
LYMPHOCYTES NFR BLD AUTO: 23.7 %
MAGNESIUM SERPL-MCNC: 1.99 MG/DL (ref 1.6–2.4)
MCH RBC QN AUTO: 29.3 PG (ref 26–34)
MCHC RBC AUTO-ENTMCNC: 33.1 G/DL (ref 32–36)
MCV RBC AUTO: 89 FL (ref 80–100)
MONOCYTES # BLD AUTO: 0.56 X10*3/UL (ref 0.1–1)
MONOCYTES NFR BLD AUTO: 10.5 %
NEUTROPHILS # BLD AUTO: 3.34 X10*3/UL (ref 1.2–7.7)
NEUTROPHILS NFR BLD AUTO: 62.4 %
NITRITE UR QL STRIP.AUTO: NEGATIVE
NRBC BLD-RTO: 0 /100 WBCS (ref 0–0)
PH UR STRIP.AUTO: 6.5 [PH]
PLATELET # BLD AUTO: 221 X10*3/UL (ref 150–450)
POTASSIUM SERPL-SCNC: 3.9 MMOL/L (ref 3.5–5.3)
PROT SERPL-MCNC: 7 G/DL (ref 6.4–8.2)
PROT UR STRIP.AUTO-MCNC: NEGATIVE MG/DL
PROTHROMBIN TIME: 9.5 SECONDS (ref 9.8–12.4)
RBC # BLD AUTO: 4.34 X10*6/UL (ref 4–5.2)
RBC # UR STRIP.AUTO: NEGATIVE MG/DL
SODIUM SERPL-SCNC: 136 MMOL/L (ref 136–145)
SP GR UR STRIP.AUTO: 1.01
URATE SERPL-MCNC: 2.5 MG/DL (ref 2.3–6.7)
UROBILINOGEN UR STRIP.AUTO-MCNC: NORMAL MG/DL
WBC # BLD AUTO: 5.4 X10*3/UL (ref 4.4–11.3)

## 2025-07-29 PROCEDURE — 84550 ASSAY OF BLOOD/URIC ACID: CPT

## 2025-07-29 PROCEDURE — 83615 LACTATE (LD) (LDH) ENZYME: CPT

## 2025-07-29 PROCEDURE — 84075 ASSAY ALKALINE PHOSPHATASE: CPT

## 2025-07-29 PROCEDURE — 36415 COLL VENOUS BLD VENIPUNCTURE: CPT

## 2025-07-29 PROCEDURE — 99214 OFFICE O/P EST MOD 30 MIN: CPT

## 2025-07-29 PROCEDURE — 83735 ASSAY OF MAGNESIUM: CPT

## 2025-07-29 PROCEDURE — 85610 PROTHROMBIN TIME: CPT

## 2025-07-29 PROCEDURE — 86140 C-REACTIVE PROTEIN: CPT

## 2025-07-29 PROCEDURE — 2560000001 HC RX 256 EXPERIMENTAL DRUGS: Mod: SE

## 2025-07-29 PROCEDURE — 81003 URINALYSIS AUTO W/O SCOPE: CPT

## 2025-07-29 PROCEDURE — 85025 COMPLETE CBC W/AUTO DIFF WBC: CPT

## 2025-07-29 PROCEDURE — 82728 ASSAY OF FERRITIN: CPT

## 2025-07-29 PROCEDURE — 96413 CHEMO IV INFUSION 1 HR: CPT

## 2025-07-29 PROCEDURE — C9399 UNCLASSIFIED DRUGS OR BIOLOG: HCPCS | Mod: SE

## 2025-07-29 PROCEDURE — 82550 ASSAY OF CK (CPK): CPT

## 2025-07-29 PROCEDURE — 84702 CHORIONIC GONADOTROPIN TEST: CPT

## 2025-07-29 PROCEDURE — 85730 THROMBOPLASTIN TIME PARTIAL: CPT

## 2025-07-29 RX ORDER — FAMOTIDINE 10 MG/ML
20 INJECTION, SOLUTION INTRAVENOUS ONCE AS NEEDED
Status: CANCELLED | OUTPATIENT
Start: 2025-07-29

## 2025-07-29 RX ORDER — PROCHLORPERAZINE EDISYLATE 5 MG/ML
10 INJECTION INTRAMUSCULAR; INTRAVENOUS EVERY 6 HOURS PRN
Status: CANCELLED | OUTPATIENT
Start: 2025-07-29

## 2025-07-29 RX ORDER — DIPHENHYDRAMINE HYDROCHLORIDE 50 MG/ML
50 INJECTION, SOLUTION INTRAMUSCULAR; INTRAVENOUS AS NEEDED
Status: CANCELLED | OUTPATIENT
Start: 2025-07-29

## 2025-07-29 RX ORDER — ALBUTEROL SULFATE 0.83 MG/ML
3 SOLUTION RESPIRATORY (INHALATION) AS NEEDED
Status: CANCELLED | OUTPATIENT
Start: 2025-07-29

## 2025-07-29 RX ORDER — EPINEPHRINE 0.3 MG/.3ML
0.3 INJECTION SUBCUTANEOUS EVERY 5 MIN PRN
Status: CANCELLED | OUTPATIENT
Start: 2025-07-29

## 2025-07-29 RX ORDER — PROCHLORPERAZINE MALEATE 10 MG
10 TABLET ORAL EVERY 6 HOURS PRN
Status: CANCELLED | OUTPATIENT
Start: 2025-07-29

## 2025-07-29 RX ADMIN — Medication 1 DOSE: at 11:07

## 2025-07-29 ASSESSMENT — ENCOUNTER SYMPTOMS
HEMATURIA: 0
HEMOPTYSIS: 0
BACK PAIN: 0
NAUSEA: 0
DIFFICULTY URINATING: 0
CONSTIPATION: 0
COUGH: 0
DEPRESSION: 0
FREQUENCY: 0
VOMITING: 0
PALPITATIONS: 0
CHILLS: 0
FEVER: 0
ADENOPATHY: 0
FLANK PAIN: 0
LEG SWELLING: 0
ABDOMINAL PAIN: 0
SEIZURES: 0
APPETITE CHANGE: 0
CONFUSION: 0
SORE THROAT: 0
HOT FLASHES: 0
MYALGIAS: 0
DYSURIA: 0
SCLERAL ICTERUS: 0
EXTREMITY WEAKNESS: 0
DIZZINESS: 0
DIARRHEA: 0
NERVOUS/ANXIOUS: 0
TROUBLE SWALLOWING: 0
SHORTNESS OF BREATH: 0
CHEST TIGHTNESS: 0
FATIGUE: 0
EYE PROBLEMS: 0

## 2025-07-29 ASSESSMENT — PAIN SCALES - GENERAL: PAINLEVEL_OUTOF10: 0-NO PAIN

## 2025-07-29 NOTE — PROGRESS NOTES
Research Note Treatment Day    Tasia Garcia is here today for treatment on [id number]. Today is C6D1. Procedures completed per protocol. AE's and con-meds reviewed with patient. Patient is aware of treatment plan. Pt feeling very well.  Pt examined by LETTY PATEL, labs reviewed, pt cleared for treatment.  Pt tolerated well, dc'd ambulatory    [x]   Received treatment as planned   OR  []    Treatment delayed; patient calendar updated as required   Treatment delayed because:    []   AE    []   Physician Discretion    []   Clinical Deterioration or Progression     []   Other      Education Documentation  No documentation found.  Education Comments  No comments found.

## 2025-07-29 NOTE — PROGRESS NOTES
.Patient ID: Tasia Garcia is a 39 y.o. female.  Referring Physician: Wes Veloz MD  [address] [place] [address]  Primary Care Provider: Arya Ku MD     No chief complaint on file.     Oncology History Overview Note   Ms. Tasia Garcia is diagnosed with stage IIb melanoma of the scalp.  She first presented to ENT oncology in November 2020 for and underwent wide local excision and sentinel lymph node biopsy on 12/5/2024 which showed a 3 mm deep cutaneous melanoma positive on the margins.  The sentinel lymph node was traced to the parotid lymph node which was negative for an deposit.  Since the margin was positive, the patient was referred for Mohs surgery which he completed on 1/22/2025 with Dr. Tevin Anne and a negative margin was achieved.  The final Breslow depth was labeled at 5 mm, no ulceration, no LVI-pT4a melanoma.  PET/CT scan on 1/6/2025 was negative for any distant metastatic deposits.  MRI brain on 2/13/2025 was negative for intracranial deposits. We spoke to the patient and she agreed to participate in REGE 2623.  She started adjuvant treatment on 4/8/2025.  Restaging CT scan on 6/26/2025 did not show any evidence of recurrence.  A small suspicious lesion was removed from the right outer thigh on 6/24/2025 which was read as atypical melanocytic neoplasm.  Since the CT scan right after removal of this lesion did not show any metastatic disease, further workup was not recommended.     Malignant melanoma of scalp (Multi)   11/15/2024 Initial Diagnosis    Referring Surgeon: Enzo Russell and Tevin Anne   Dermatologist: Rosio Velazquez   Date of first meeting with our team: 02/12/2025    Date of First meeting with surgical team: 11/12/2024  Melanoma type: Cutaneous  Location of primary site: Scalp-primary dermal  Date of WLE and SLNB: Initial resection on 12/5/2024 (Dr. Madelin Rsusell) followed by Mohs surgery (Dr. Tevin Anne) to clear margins on  "1/22/25.  Final pathology: Breslow Depth-5 mm; Ulceration status-negative; LVI-negative; Other high risk features-negative  Goldonna lymph node status: 1 node examined in the parotid gland-found to be negative for malignancy  Final Stage: pT4a N0 M0-stage IIb melanoma- AJCC 8th Ed.    BRAF status: Not known  Initial MRI brain with and without contrast: 2/13/2025-unremarkable  Initial Full body PET scan: 01/6/2025-unremarkable       1/1/2025 Cancer Staged    Staging form: Melanoma of the Skin, AJCC 8th Edition, Pathologic stage from 1/1/2025: Stage IIB (pT4a, pN0, cM0) - Signed by Wes Veloz MD on 2/13/2025 4/8/2025 -  Research Study Participant    ([facility]) [id number] Arms A, B, & C - Fianlimab / Cemiplimab or Pembrolizumab, 21 Day Cycles  Plan Provider: BEN Prado-CNP  Treatment goal: Curative  Line of treatment: Adjuvant  Associated studies: Fianlimab and Cemiplimab Versus Pembrolizumab in Resected High-risk Melanoma        Ms. Tasia Garcia is diagnosed with stage IIB melanoma- ([id number])- AJCC 8th Ed. She initially underwent wide local excision on 12/5/24 with Dr. Madelin Russell, but the margin was positive. She was then sent to Dr. Tevin Anne for Mohs surgery which was done on 1/22/25 and the margin was cleared. The sentinel nodes were negative. She met with medical oncology and decided to participate in the REGE 2623 trial.     Signed consent on [id number]. Double blind study with Fianlimab (Anti-LAG-3) and Cemiplimab Versus Pembrolizumab in the Adjuvant Setting.    C1: 04.08.25  C2: 04.29.25  C3: 05.19.25  C4: 06.13.25  C5: 07.08.25  C6: 07.29.25      Subjective   Please refer to \"Notes/Cancer History\" above for complete History of present illness.     Today 7/29/2025  Patient in clinic for C6D1 on [id number].  Reports she continues to tolerate treatment with out any major side effects from the treatment. She reports she continues to be active. Patient denies any pain, dizziness, " lightheadedness, headaches, rash/itching, chills or fever, SOB, cough, sputum, chest pain or chest tightness, painful inflammation/ulceration oral mucous membranes, nausea/vomiting, diarrhea/constipation, hematemesis /hemoptysis, hematuria/rectal bleeding, urinary symptoms, swelling extremities, weakness/fatigue, or peripheral neuropathy. ROS as above. Remainder of 10 point review of systems elicited and otherwise unremarkable.     Review of Systems:   Review of Systems   Constitutional:  Negative for appetite change, chills, fatigue and fever.   HENT:   Negative for hearing loss, lump/mass, mouth sores, sore throat and trouble swallowing.    Eyes:  Negative for eye problems and icterus.   Respiratory:  Negative for chest tightness, cough, hemoptysis and shortness of breath.    Cardiovascular:  Negative for chest pain, leg swelling and palpitations.   Gastrointestinal:  Negative for abdominal pain, constipation, diarrhea, nausea and vomiting.   Endocrine: Negative for hot flashes.   Genitourinary:  Negative for difficulty urinating, dysuria, frequency and hematuria.    Musculoskeletal:  Negative for back pain, flank pain, gait problem and myalgias.   Skin:  Negative for itching and rash.   Neurological:  Negative for dizziness, extremity weakness, gait problem and seizures.   Hematological:  Negative for adenopathy.   Psychiatric/Behavioral:  Negative for confusion and depression. The patient is not nervous/anxious.          MEDICAL HISTORY  Past Medical History:   Diagnosis Date    Dental disease 07/2024    Melanoma of scalp (Multi)     Personal history of other diseases of the female genital tract     History of infertility       FAMILY HISTORY  Family History   Problem Relation Name Age of Onset    Other (cervical carcinoma) Mother Kimberly michaels     Cancer Mother Kimberly michaels     Stroke Mother Kimberly michaels     Ovarian cancer Mother Kimberly michaels 45    Cerebral palsy Sister      Breast cancer Paternal Grandmother  Laura Fallon         AGE UNKNOWN       TOBACCO HISTORY  Tobacco Use: Low Risk  (7/8/2025)    Patient History     Smoking Tobacco Use: Never     Smokeless Tobacco Use: Never     Passive Exposure: Never       SOCIAL HISTORY  Social Connections: Moderately Integrated (2/12/2025)    Social Connection and Isolation Panel     Frequency of Communication with Friends and Family: More than three times a week     Frequency of Social Gatherings with Friends and Family: More than three times a week     Attends [employer]ism Services: More than 4 times per year     Active Member of Clubs or Organizations: Yes     Attends Club or Organization Meetings: More than 4 times per year     Marital Status:    Recent Concern: Social Connections - At Risk (1/25/2025)    Received from [employer]     How often do you see or talk to people that you care about and feel close to? (For example: talking to friends on phone, visiting friends or family, going to [employer] or club meetings): 2        Outpatient Medication Profile:  Current Outpatient Medications on File Prior to Visit   Medication Sig Dispense Refill    norgestrel (Opill) 0.075 mg tablet Take 1 tablet by mouth once daily. This is not used for an Emergency Contraceptive but taken daily 28 each 11    prenatal vit,calc76-iron-folic (Prenatabs Rx) 29 mg iron- 1 mg tablet Take 1 tablet by mouth once daily.      [DISCONTINUED] norgestrel (Opill) 0.075 mg tablet Take 1 tablet by mouth once daily. This is not used for an Emergency Contraceptive but taken daily 28 each 2     No current facility-administered medications on file prior to visit.      Performance Status:  ECOG 0 : Fully active, able to carry on all pre-disease performance without restriction      Vitals and Measurements:   /74 (BP Location: Left arm, Patient Position: Sitting, BP Cuff Size: Large adult)   Pulse 74   Temp 36.7 °C (98.1 °F) (Skin)   Resp 16   Wt 93.2 kg (205 lb 8 oz)   SpO2 100%    "BMI 38.20 kg/m²      Daily Weight  07/29/25 : 93.2 kg (205 lb 8 oz)  07/08/25 : 92.8 kg (204 lb 9.4 oz)  06/13/25 : 91 kg (200 lb 9.9 oz)  05/19/25 : 90.5 kg (199 lb 9.6 oz)  05/05/25 : 88.5 kg (195 lb)         4/8/2025     1:34 PM 4/29/2025     8:23 AM 5/5/2025    10:45 AM 5/19/2025     9:09 AM 6/13/2025    11:03 AM 7/8/2025    10:18 AM 7/29/2025     9:15 AM   Vitals   Systolic 124 118  115 122 125 130   Diastolic 65 63  63 68 72 74   BP Location Right arm Right arm  Right arm Right arm  Left arm   Heart Rate 74 64  71 73 71 74   Temp 36 °C (96.8 °F) 35.2 °C (95.4 °F)  36.3 °C (97.3 °F) 36.4 °C (97.5 °F) 36.3 °C (97.3 °F) 36.7 °C (98.1 °F)   Resp 18 16  16 18 20 16   Height   1.562 m (5' 1.5\")       Weight (lb) 199 197.5 195 199.6 200.62 204.59 205.5   BMI 37.52 kg/m2 37.24 kg/m2 36.25 kg/m2 37.1 kg/m2 37.29 kg/m2 38.03 kg/m2 38.2 kg/m2   BSA (m2) 1.97 m2 1.96 m2 1.96 m2 1.98 m2 1.99 m2 2.01 m2 2.01 m2   Visit Report Report Report Report Report Report Report Report          Physical Exam:   Physical Exam  Vitals reviewed.   Constitutional:       Appearance: Normal appearance.   HENT:      Head: Normocephalic.      Mouth/Throat:      Mouth: Mucous membranes are moist.      Tongue: No lesions.      Palate: No mass.      Pharynx: Oropharynx is clear.     Eyes:      Extraocular Movements: Extraocular movements intact.      Conjunctiva/sclera: Conjunctivae normal.      Pupils: Pupils are equal, round, and reactive to light.       Cardiovascular:      Rate and Rhythm: Normal rate and regular rhythm.      Heart sounds: Normal heart sounds, S1 normal and S2 normal.   Pulmonary:      Effort: Pulmonary effort is normal. No respiratory distress.      Breath sounds: Normal breath sounds.   Abdominal:      General: Bowel sounds are normal.      Palpations: Abdomen is soft.     Musculoskeletal:         General: Normal range of motion.      Cervical back: Full passive range of motion without pain, normal range of motion and neck " supple.   Lymphadenopathy:      Cervical: No cervical adenopathy.     Skin:     General: Skin is warm and dry.      Capillary Refill: Capillary refill takes less than 2 seconds.      Comments: Scan lesion well healed. New benign lesion removed from the back by dermatology.     Neurological:      General: No focal deficit present.      Cranial Nerves: Cranial nerves 2-12 are intact.     Psychiatric:         Attention and Perception: Attention normal.         Mood and Affect: Mood normal.         Behavior: Behavior is cooperative.         Cognition and Memory: Cognition normal.         Judgment: Judgment normal.         Lab Results:  I have reviewed these laboratory results:     Lab on 07/29/2025   Component Date Value Ref Range Status    WBC 07/29/2025 5.4  4.4 - 11.3 x10*3/uL Final    nRBC 07/29/2025 0.0  0.0 - 0.0 /100 WBCs Final    RBC 07/29/2025 4.34  4.00 - 5.20 x10*6/uL Final    Hemoglobin 07/29/2025 12.7  12.0 - 16.0 g/dL Final    Hematocrit 07/29/2025 38.4  36.0 - 46.0 % Final    MCV 07/29/2025 89  80 - 100 fL Final    MCH 07/29/2025 29.3  26.0 - 34.0 pg Final    MCHC 07/29/2025 33.1  32.0 - 36.0 g/dL Final    RDW 07/29/2025 12.6  11.5 - 14.5 % Final    Platelets 07/29/2025 221  150 - 450 x10*3/uL Final    Neutrophils % 07/29/2025 62.4  40.0 - 80.0 % Final    Immature Granulocytes %, Automated 07/29/2025 0.4  0.0 - 0.9 % Final    Lymphocytes % 07/29/2025 23.7  13.0 - 44.0 % Final    Monocytes % 07/29/2025 10.5  2.0 - 10.0 % Final    Eosinophils % 07/29/2025 2.6  0.0 - 6.0 % Final    Basophils % 07/29/2025 0.4  0.0 - 2.0 % Final    Neutrophils Absolute 07/29/2025 3.34  1.20 - 7.70 x10*3/uL Final    Immature Granulocytes Absolute, Au* 07/29/2025 0.02  0.00 - 0.70 x10*3/uL Final    Lymphocytes Absolute 07/29/2025 1.27  1.20 - 4.80 x10*3/uL Final    Monocytes Absolute 07/29/2025 0.56  0.10 - 1.00 x10*3/uL Final    Eosinophils Absolute 07/29/2025 0.14  0.00 - 0.70 x10*3/uL Final    Basophils Absolute 07/29/2025  0.02  0.00 - 0.10 x10*3/uL Final    Glucose 07/29/2025 94  74 - 99 mg/dL Final    Sodium 07/29/2025 136  136 - 145 mmol/L Final    Potassium 07/29/2025 3.9  3.5 - 5.3 mmol/L Final    Chloride 07/29/2025 104  98 - 107 mmol/L Final    Bicarbonate 07/29/2025 26  21 - 32 mmol/L Final    Anion Gap 07/29/2025 10  10 - 20 mmol/L Final    Urea Nitrogen 07/29/2025 10  6 - 23 mg/dL Final    Creatinine 07/29/2025 0.66  0.50 - 1.05 mg/dL Final    eGFR 07/29/2025 >90  >60 mL/min/1.73m*2 Final    Calcium 07/29/2025 8.8  8.6 - 10.3 mg/dL Final    Albumin 07/29/2025 3.9  3.4 - 5.0 g/dL Final    Alkaline Phosphatase 07/29/2025 69  33 - 110 U/L Final    Total Protein 07/29/2025 7.0  6.4 - 8.2 g/dL Final    AST 07/29/2025 14  9 - 39 U/L Final    Bilirubin, Total 07/29/2025 0.5  0.0 - 1.2 mg/dL Final    ALT 07/29/2025 11  7 - 45 U/L Final    HCG, Beta-Quantitative 07/29/2025 <3  <5 mIU/mL Final    aPTT 07/29/2025 26  26 - 36 seconds Final    Creatine Kinase 07/29/2025 60  0 - 215 U/L Final    C-Reactive Protein 07/29/2025 0.97  <1.00 mg/dL Final    Ferritin 07/29/2025 16  8 - 150 ng/mL Final    LDH 07/29/2025 118  84 - 246 U/L Final    Magnesium 07/29/2025 1.99  1.60 - 2.40 mg/dL Final    Protime 07/29/2025 9.5 (L)  9.8 - 12.4 seconds Final    INR 07/29/2025 0.9  0.9 - 1.1 Final    Uric Acid 07/29/2025 2.5  2.3 - 6.7 mg/dL Final    Color, Urine 07/29/2025 Light-Yellow  Light-Yellow, Yellow, Dark-Yellow Final    Appearance, Urine 07/29/2025 Clear  Clear Final    Specific Gravity, Urine 07/29/2025 1.013  1.005 - 1.035 Final    pH, Urine 07/29/2025 6.5  5.0, 5.5, 6.0, 6.5, 7.0, 7.5, 8.0 Final    Protein, Urine 07/29/2025 NEGATIVE  NEGATIVE, 10 (TRACE), 20 (TRACE) mg/dL Final    Glucose, Urine 07/29/2025 Normal  Normal mg/dL Final    Blood, Urine 07/29/2025 NEGATIVE  NEGATIVE mg/dL Final    Ketones, Urine 07/29/2025 NEGATIVE  NEGATIVE mg/dL Final    Bilirubin, Urine 07/29/2025 NEGATIVE  NEGATIVE mg/dL Final    Urobilinogen, Urine  07/29/2025 Normal  Normal mg/dL Final    Nitrite, Urine 07/29/2025 NEGATIVE  NEGATIVE Final    Leukocyte Esterase, Urine 07/29/2025 NEGATIVE  NEGATIVE Final   Lab on 07/08/2025   Component Date Value Ref Range Status    WBC 07/08/2025 6.1  4.4 - 11.3 x10*3/uL Final    nRBC 07/08/2025 0.0  0.0 - 0.0 /100 WBCs Final    RBC 07/08/2025 4.47  4.00 - 5.20 x10*6/uL Final    Hemoglobin 07/08/2025 13.1  12.0 - 16.0 g/dL Final    Hematocrit 07/08/2025 39.5  36.0 - 46.0 % Final    MCV 07/08/2025 88  80 - 100 fL Final    MCH 07/08/2025 29.3  26.0 - 34.0 pg Final    MCHC 07/08/2025 33.2  32.0 - 36.0 g/dL Final    RDW 07/08/2025 12.5  11.5 - 14.5 % Final    Platelets 07/08/2025 292  150 - 450 x10*3/uL Final    Neutrophils % 07/08/2025 65.9  40.0 - 80.0 % Final    Immature Granulocytes %, Automated 07/08/2025 0.2  0.0 - 0.9 % Final    Lymphocytes % 07/08/2025 26.1  13.0 - 44.0 % Final    Monocytes % 07/08/2025 6.7  2.0 - 10.0 % Final    Eosinophils % 07/08/2025 0.8  0.0 - 6.0 % Final    Basophils % 07/08/2025 0.3  0.0 - 2.0 % Final    Neutrophils Absolute 07/08/2025 4.04  1.20 - 7.70 x10*3/uL Final    Immature Granulocytes Absolute, Au* 07/08/2025 0.01  0.00 - 0.70 x10*3/uL Final    Lymphocytes Absolute 07/08/2025 1.60  1.20 - 4.80 x10*3/uL Final    Monocytes Absolute 07/08/2025 0.41  0.10 - 1.00 x10*3/uL Final    Eosinophils Absolute 07/08/2025 0.05  0.00 - 0.70 x10*3/uL Final    Basophils Absolute 07/08/2025 0.02  0.00 - 0.10 x10*3/uL Final    Glucose 07/08/2025 99  74 - 99 mg/dL Final    Sodium 07/08/2025 135 (L)  136 - 145 mmol/L Final    Potassium 07/08/2025 4.1  3.5 - 5.3 mmol/L Final    Chloride 07/08/2025 102  98 - 107 mmol/L Final    Bicarbonate 07/08/2025 26  21 - 32 mmol/L Final    Anion Gap 07/08/2025 11  10 - 20 mmol/L Final    Urea Nitrogen 07/08/2025 15  6 - 23 mg/dL Final    Creatinine 07/08/2025 0.68  0.50 - 1.05 mg/dL Final    eGFR 07/08/2025 >90  >60 mL/min/1.73m*2 Final    Calcium 07/08/2025 9.3  8.6 - 10.3  mg/dL Final    Albumin 07/08/2025 4.1  3.4 - 5.0 g/dL Final    Alkaline Phosphatase 07/08/2025 67  33 - 110 U/L Final    Total Protein 07/08/2025 7.5  6.4 - 8.2 g/dL Final    AST 07/08/2025 19  9 - 39 U/L Final    Bilirubin, Total 07/08/2025 0.9  0.0 - 1.2 mg/dL Final    ALT 07/08/2025 16  7 - 45 U/L Final    HCG, Beta-Quantitative 07/08/2025 <3  <5 mIU/mL Final    Adrenocorticotropic Hormone (ACTH) 07/08/2025 6.8 (L)  7.2 - 63.3 pg/mL Final    aPTT 07/08/2025 27  26 - 36 seconds Final    Cortisol 07/08/2025 11.4  2.5 - 20.0 ug/dL Final    Creatine Kinase 07/08/2025 81  0 - 215 U/L Final    C-Reactive Protein 07/08/2025 0.23  <1.00 mg/dL Final    Ferritin 07/08/2025 15  8 - 150 ng/mL Final    LDH 07/08/2025 128  84 - 246 U/L Final    Magnesium 07/08/2025 2.08  1.60 - 2.40 mg/dL Final    Protime 07/08/2025 9.7 (L)  9.8 - 12.4 seconds Final    INR 07/08/2025 0.9  0.9 - 1.1 Final    Thyroid Stimulating Hormone 07/08/2025 1.11  0.44 - 3.98 mIU/L Final    Thyroxine, Free 07/08/2025 1.25  0.78 - 1.48 ng/dL Final    Uric Acid 07/08/2025 2.6  2.3 - 6.7 mg/dL Final    Color, Urine 07/08/2025 Light-Yellow  Light-Yellow, Yellow, Dark-Yellow Final    Appearance, Urine 07/08/2025 Clear  Clear Final    Specific Gravity, Urine 07/08/2025 1.024  1.005 - 1.035 Final    pH, Urine 07/08/2025 5.5  5.0, 5.5, 6.0, 6.5, 7.0, 7.5, 8.0 Final    Protein, Urine 07/08/2025 NEGATIVE  NEGATIVE, 10 (TRACE), 20 (TRACE) mg/dL Final    Glucose, Urine 07/08/2025 Normal  Normal mg/dL Final    Blood, Urine 07/08/2025 0.06 (1+) (A)  NEGATIVE mg/dL Final    Ketones, Urine 07/08/2025 NEGATIVE  NEGATIVE mg/dL Final    Bilirubin, Urine 07/08/2025 NEGATIVE  NEGATIVE mg/dL Final    Urobilinogen, Urine 07/08/2025 Normal  Normal mg/dL Final    Nitrite, Urine 07/08/2025 NEGATIVE  NEGATIVE Final    Leukocyte Esterase, Urine 07/08/2025 NEGATIVE  NEGATIVE Final    WBC, Urine 07/08/2025 NONE  1-5, NONE /HPF Final    RBC, Urine 07/08/2025 1-2  NONE, 1-2, 3-5 /HPF  Final    Squamous Epithelial Cells, Urine 07/08/2025 1-9 (SPARSE)  Reference range not established. /HPF Final    Mucus, Urine 07/08/2025 FEW  Reference range not established. /LPF Final               Radiology Result:     === 06/26/25 ===    CT CHEST ABDOMEN PELVIS W IV CONTRAST    - Impression -  Restaging scan for scalp melanoma with CT comparison dated 04/01/2025.  1. No evidence of metastatic disease in the chest, abdomen, or pelvis.  2. Stable chronic findings as above.    I personally reviewed the images/study and I agree with the findings  as stated by Femi Beard MD. This study was interpreted at  [place], [place].    MACRO:  None    Signed by: Alexander Saravia 6/26/2025 4:04 PM  Dictation workstation:   [id number]      Pathology Results:  I have reviewed the full pathology report recorded in the EMR. The pertinent portions indicating diagnosis are listed here in the note. for details please refer to the full report recorded in the EMR.    Dermatopathology- DERM LAB: [id number]  Order: [id number]   Collected 1/22/2025 11:41       Status: Edited Result - FINAL       Visible to patient: Yes (seen)       Dx: Malignant melanoma of scalp (Multi)    0 Result Notes      Component    FINAL DIAGNOSIS   SKIN, LEFT CENTRAL FRONTAL SCALP, EXCISION:  MALIGNANT MELANOMA, BRESLOW THICKNESS AT LEAST 3 MM, PRESENT ON THE DEEP MARGIN, SEE NOTE.     Note: Microscopic examination reveals a specimen that extends into the subcutaneous fat. In the superficial and deep dermis there are nests and fascicles of atypical epithelioid and spindled cells that focally appear to involve the deep margin. There appears to be processing artifact from previous freezing which makes the interpretation of the mitotic rate challenging.     ** Electronically signed out by Bouchra Ayala MD **      Electronically signed by Bouchra Ayala MD on 1/27/2025 at 1111        Micrographic Surgery  Details:  Post-operative length (cm): 1.8  Post-operative width (cm): 1.7  Number of Mohs stages: 1  Indication for sending permanent sections: evaluate a debulking specimen     Stage 1     Comments: The patient was brought into the operating room and placed in the procedure chair in the appropriate position.  The area positive by previous biopsy was identified and confirmed with the patient. The area of clinically obvious tumor was debulked using a curette and/or scalpel as needed. An incision was made following the Mohs approach through the skin. The specimen was taken to the lab, divided into 3 piece(s) and appropriately chromacoded and processed.        Debulk:  5mm (will send for permanent)        Tumor features identified on Mohs section: no tumor identified      Depth of invasion: subcutaneous fat     Depth of defect: subcutaneous fat     Patient tolerance of procedure: tolerated well, no immediate complications     Assessment and Plan:   Assessment/Plan      Ms. Tasia Garcia is a 39 y.o. female with a diagnosis of stage IIb melanoma of the scalp. Please see the evolution of the case listed above in the cancer history.     Today 7.29.2025,   In past visit Dr Veloz discussed about different treatment options available for Melanoma. He also discussed the role of immunotherapy in the adjuvant setting. He shared info about SOC vs Clinical trial. Patient decided to participate on study and signed consent. She completed screening requirements for the study and started treatment on 4.8.2025. To date completed 5 cycles and tolerated well without any major REA's. Most recent imaging does not suggest of disease recurrence.  Today she is C6D1 treatment, She looks clinically good today we will proceed with treatment. Educated to monitor for any skin lesions that are different in color, shape, or size than others on body. Educated about use of Sun protection. Recommend SPF 30+, hats with brims, sun  protective clothing, and avoiding sun exposure between 10 AM and 2 PM whenever possible. Recommend regular skin exams or sooner if new or changing lesions. Plan discussed in detail with the patient. Patient in agreement with the plan of care and is aware to call the office and research nurse if experiencing any new symptoms. RTC per protocol.       # Stage 2B melanoma of scalp  - Proceed with C6D1 treatment on [id number].   - Continues to follow with Dermatology  - Educated about melanoma prevention strategies.     DISCLAIMER:   In preparing for this visit and writing this note, I reviewed all the previous electronic medical records (labs, imaging and medical charts) of the patient available in the physician portal. Significant findings which helped in decision making are recorded  in this chart.     The plan was discussed with the patient. We gave him ample opportunities to ask questions. All questions were answered to his satisfaction and he verbalized understanding.      MD Collin Olivo APRN.

## 2025-08-19 ENCOUNTER — INFUSION (OUTPATIENT)
Dept: HEMATOLOGY/ONCOLOGY | Facility: HOSPITAL | Age: 40
End: 2025-08-19
Payer: COMMERCIAL

## 2025-08-19 ENCOUNTER — DOCUMENTATION (OUTPATIENT)
Dept: HEMATOLOGY/ONCOLOGY | Facility: HOSPITAL | Age: 40
End: 2025-08-19

## 2025-08-19 ENCOUNTER — OFFICE VISIT (OUTPATIENT)
Dept: HEMATOLOGY/ONCOLOGY | Facility: HOSPITAL | Age: 40
End: 2025-08-19
Payer: COMMERCIAL

## 2025-08-19 ENCOUNTER — LAB (OUTPATIENT)
Dept: LAB | Facility: HOSPITAL | Age: 40
End: 2025-08-19
Payer: COMMERCIAL

## 2025-08-19 VITALS
DIASTOLIC BLOOD PRESSURE: 66 MMHG | BODY MASS INDEX: 38.68 KG/M2 | HEART RATE: 64 BPM | SYSTOLIC BLOOD PRESSURE: 124 MMHG | TEMPERATURE: 97.3 F | OXYGEN SATURATION: 100 % | WEIGHT: 208.1 LBS

## 2025-08-19 DIAGNOSIS — C43.4 MALIGNANT MELANOMA OF SCALP (MULTI): Primary | ICD-10-CM

## 2025-08-19 DIAGNOSIS — C43.4 MALIGNANT MELANOMA OF SCALP (MULTI): ICD-10-CM

## 2025-08-19 PROCEDURE — 2560000001 HC RX 256 EXPERIMENTAL DRUGS: Mod: SE

## 2025-08-19 PROCEDURE — 99214 OFFICE O/P EST MOD 30 MIN: CPT

## 2025-08-19 PROCEDURE — 96413 CHEMO IV INFUSION 1 HR: CPT

## 2025-08-19 PROCEDURE — C9399 UNCLASSIFIED DRUGS OR BIOLOG: HCPCS | Mod: SE

## 2025-08-19 RX ORDER — PROCHLORPERAZINE MALEATE 10 MG
10 TABLET ORAL EVERY 6 HOURS PRN
Status: CANCELLED | OUTPATIENT
Start: 2025-08-19

## 2025-08-19 RX ORDER — DIPHENHYDRAMINE HYDROCHLORIDE 50 MG/ML
50 INJECTION, SOLUTION INTRAMUSCULAR; INTRAVENOUS AS NEEDED
Status: CANCELLED | OUTPATIENT
Start: 2025-08-19

## 2025-08-19 RX ORDER — FAMOTIDINE 10 MG/ML
20 INJECTION, SOLUTION INTRAVENOUS ONCE AS NEEDED
Status: CANCELLED | OUTPATIENT
Start: 2025-08-19

## 2025-08-19 RX ORDER — EPINEPHRINE 0.3 MG/.3ML
0.3 INJECTION SUBCUTANEOUS EVERY 5 MIN PRN
Status: CANCELLED | OUTPATIENT
Start: 2025-08-19

## 2025-08-19 RX ORDER — PROCHLORPERAZINE EDISYLATE 5 MG/ML
10 INJECTION INTRAMUSCULAR; INTRAVENOUS EVERY 6 HOURS PRN
Status: CANCELLED | OUTPATIENT
Start: 2025-08-19

## 2025-08-19 RX ORDER — ALBUTEROL SULFATE 0.83 MG/ML
3 SOLUTION RESPIRATORY (INHALATION) AS NEEDED
Status: CANCELLED | OUTPATIENT
Start: 2025-08-19

## 2025-08-19 RX ADMIN — Medication 1 DOSE: at 11:12

## 2025-08-19 ASSESSMENT — ENCOUNTER SYMPTOMS
BACK PAIN: 0
FEVER: 0
APPETITE CHANGE: 0
TROUBLE SWALLOWING: 0
DYSURIA: 0
DIZZINESS: 0
HEMATURIA: 0
CHEST TIGHTNESS: 0
SORE THROAT: 0
MYALGIAS: 0
SCLERAL ICTERUS: 0
CONFUSION: 0
HOT FLASHES: 0
DEPRESSION: 0
DIARRHEA: 0
PALPITATIONS: 0
HEMOPTYSIS: 0
SEIZURES: 0
NERVOUS/ANXIOUS: 0
ABDOMINAL PAIN: 0
CONSTIPATION: 0
ADENOPATHY: 0
SHORTNESS OF BREATH: 0
DIFFICULTY URINATING: 0
COUGH: 0
FREQUENCY: 0
CHILLS: 0
FLANK PAIN: 0
NAUSEA: 0
LEG SWELLING: 0
EXTREMITY WEAKNESS: 0
FATIGUE: 0
VOMITING: 0
EYE PROBLEMS: 0

## 2025-08-19 ASSESSMENT — PAIN SCALES - GENERAL: PAINLEVEL_OUTOF10: 0-NO PAIN

## 2025-08-26 ENCOUNTER — APPOINTMENT (OUTPATIENT)
Dept: DERMATOLOGY | Facility: CLINIC | Age: 40
End: 2025-08-26
Payer: COMMERCIAL

## 2025-08-26 DIAGNOSIS — C43.71 MALIGNANT MELANOMA OF RIGHT LOWER LEG (MULTI): ICD-10-CM

## 2025-08-28 LAB
LABORATORY COMMENT REPORT: NORMAL
PATH REPORT.FINAL DX SPEC: NORMAL
PATH REPORT.GROSS SPEC: NORMAL
PATH REPORT.MICROSCOPIC SPEC OTHER STN: NORMAL
PATH REPORT.RELEVANT HX SPEC: NORMAL
PATH REPORT.TOTAL CANCER: NORMAL

## 2025-09-02 ENCOUNTER — APPOINTMENT (OUTPATIENT)
Dept: DERMATOLOGY | Facility: CLINIC | Age: 40
End: 2025-09-02
Payer: COMMERCIAL

## 2025-09-05 ENCOUNTER — TELEPHONE (OUTPATIENT)
Dept: DERMATOLOGY | Facility: CLINIC | Age: 40
End: 2025-09-05
Payer: COMMERCIAL

## 2025-09-12 ENCOUNTER — APPOINTMENT (OUTPATIENT)
Dept: DERMATOLOGY | Facility: CLINIC | Age: 40
End: 2025-09-12
Payer: COMMERCIAL

## 2025-10-02 ENCOUNTER — APPOINTMENT (OUTPATIENT)
Dept: DERMATOLOGY | Facility: CLINIC | Age: 40
End: 2025-10-02
Payer: COMMERCIAL

## 2025-11-04 ENCOUNTER — APPOINTMENT (OUTPATIENT)
Dept: HEMATOLOGY/ONCOLOGY | Facility: HOSPITAL | Age: 40
End: 2025-11-04
Payer: COMMERCIAL

## 2025-11-17 ENCOUNTER — APPOINTMENT (OUTPATIENT)
Dept: OTOLARYNGOLOGY | Facility: CLINIC | Age: 40
End: 2025-11-17
Payer: COMMERCIAL

## (undated) DEVICE — Device

## (undated) DEVICE — MANIFOLD, 4 PORT NEPTUNE STANDARD

## (undated) DEVICE — STAY SET, SURGICAL, 5MM SHARP HOOK, 8PK

## (undated) DEVICE — SUTURE, PERMA HAND 2-0, TAPER POINT, SH BLACK 8-18 INCH

## (undated) DEVICE — COVER, TRANSDUCER, NEO GUARD, 2.6 X 30CM, LF

## (undated) DEVICE — SPONGE GAUZE, XRAY SC+RFID, 4X4 16 PLY, STERILE

## (undated) DEVICE — COVER, CART, 45 X 27 X 48 IN, CLEAR

## (undated) DEVICE — REST, HEAD, BAGEL, 9 IN

## (undated) DEVICE — SUTURE, MONOCRYL, 4-0, 18 IN, PS2, UNDYED

## (undated) DEVICE — CLIP, LIGATING, HORIZON, WIDE SLOT, SMALL, TITANIUM

## (undated) DEVICE — SYRINGE, MONOJECT, LUER LOCK, 3 CC, LF

## (undated) DEVICE — PROBE, STIMULATOR, MONOPOLAR, FLUSH TIP, PRASS, W/HANDLE, STANDARD

## (undated) DEVICE — BOWL, UTILITY, 32 OZ, PLASTIC, STERILE

## (undated) DEVICE — SYRINGE, HYPODERMIC, TB, W/O NEEDLE, 1 CC, SLIP TIP